# Patient Record
Sex: FEMALE | Race: WHITE | NOT HISPANIC OR LATINO | Employment: OTHER
[De-identification: names, ages, dates, MRNs, and addresses within clinical notes are randomized per-mention and may not be internally consistent; named-entity substitution may affect disease eponyms.]

---

## 2018-07-27 ENCOUNTER — RX ONLY (RX ONLY)
Age: 68
End: 2018-07-27

## 2018-07-27 ENCOUNTER — DOCTOR'S OFFICE (OUTPATIENT)
Dept: URBAN - NONMETROPOLITAN AREA CLINIC 1 | Facility: CLINIC | Age: 68
Setting detail: OPHTHALMOLOGY
End: 2018-07-27
Payer: COMMERCIAL

## 2018-07-27 DIAGNOSIS — E11.9: ICD-10-CM

## 2018-07-27 DIAGNOSIS — H25.13: ICD-10-CM

## 2018-07-27 DIAGNOSIS — H25.043: ICD-10-CM

## 2018-07-27 PROCEDURE — 76512 OPH US DX B-SCAN: CPT | Performed by: OPHTHALMOLOGY

## 2018-07-27 PROCEDURE — 92004 COMPRE OPH EXAM NEW PT 1/>: CPT | Performed by: OPHTHALMOLOGY

## 2018-07-27 ASSESSMENT — REFRACTION_MANIFEST
OU_VA: 20/
OS_VA3: 20/
OD_VA1: 20/
OS_VA1: 20/
OD_VA1: 20/
OS_VA3: 20/
OU_VA: 20/
OS_VA1: 20/
OU_VA: 20/
OS_VA2: 20/
OS_VA2: 20/
OD_VA2: 20/
OD_VA2: 20/
OS_VA1: 20/
OD_VA3: 20/
OD_VA2: 20/
OS_VA2: 20/
OD_VA3: 20/
OS_VA3: 20/
OD_VA1: 20/
OD_VA3: 20/

## 2018-07-27 ASSESSMENT — VISUAL ACUITY
OS_BCVA: 20/100
OD_BCVA: 20/CF X 5'

## 2018-07-27 ASSESSMENT — REFRACTION_AUTOREFRACTION
OS_AXIS: 102
OS_CYLINDER: -2.00
OS_SPHERE: -2.50
OD_SPHERE: +0.50
OD_AXIS: 095
OD_CYLINDER: -1.00

## 2018-07-27 ASSESSMENT — REFRACTION_CURRENTRX
OD_OVR_VA: 20/
OD_OVR_VA: 20/
OS_OVR_VA: 20/
OS_OVR_VA: 20/
OD_OVR_VA: 20/
OS_OVR_VA: 20/

## 2018-07-27 ASSESSMENT — CONFRONTATIONAL VISUAL FIELD TEST (CVF)
OS_FINDINGS: FULL
OD_FINDINGS: FULL

## 2018-07-27 ASSESSMENT — SPHEQUIV_DERIVED
OS_SPHEQUIV: -3.5
OD_SPHEQUIV: 0

## 2018-08-15 ENCOUNTER — DOCTOR'S OFFICE (OUTPATIENT)
Dept: URBAN - NONMETROPOLITAN AREA CLINIC 1 | Facility: CLINIC | Age: 68
Setting detail: OPHTHALMOLOGY
End: 2018-08-15
Payer: COMMERCIAL

## 2018-08-15 DIAGNOSIS — H25.12: ICD-10-CM

## 2018-08-15 PROCEDURE — 92136 OPHTHALMIC BIOMETRY: CPT | Performed by: OPHTHALMOLOGY

## 2018-09-06 ENCOUNTER — AMBUL SURGICAL CARE (OUTPATIENT)
Dept: URBAN - NONMETROPOLITAN AREA SURGERY 1 | Facility: SURGERY | Age: 68
Setting detail: OPHTHALMOLOGY
End: 2018-09-06
Payer: COMMERCIAL

## 2018-09-06 DIAGNOSIS — H25.12: ICD-10-CM

## 2018-09-06 DIAGNOSIS — H25.042: ICD-10-CM

## 2018-09-06 PROCEDURE — 66984 XCAPSL CTRC RMVL W/O ECP: CPT | Performed by: OPHTHALMOLOGY

## 2018-09-06 PROCEDURE — G8918 PT W/O PREOP ORDER IV AB PRO: HCPCS | Performed by: OPHTHALMOLOGY

## 2018-09-06 PROCEDURE — G8907 PT DOC NO EVENTS ON DISCHARG: HCPCS | Performed by: OPHTHALMOLOGY

## 2018-09-07 ENCOUNTER — DOCTOR'S OFFICE (OUTPATIENT)
Dept: URBAN - NONMETROPOLITAN AREA CLINIC 1 | Facility: CLINIC | Age: 68
Setting detail: OPHTHALMOLOGY
End: 2018-09-07
Payer: COMMERCIAL

## 2018-09-07 DIAGNOSIS — H25.11: ICD-10-CM

## 2018-09-07 DIAGNOSIS — Z96.1: ICD-10-CM

## 2018-09-07 DIAGNOSIS — H25.041: ICD-10-CM

## 2018-09-07 PROCEDURE — 92136 OPHTHALMIC BIOMETRY: CPT | Performed by: OPHTHALMOLOGY

## 2018-09-07 PROCEDURE — 99024 POSTOP FOLLOW-UP VISIT: CPT | Performed by: PHYSICIAN ASSISTANT

## 2018-09-10 ASSESSMENT — REFRACTION_MANIFEST
OS_VA2: 20/
OU_VA: 20/
OD_VA2: 20/
OD_VA1: 20/
OD_VA1: 20/
OS_VA2: 20/
OS_VA1: 20/
OS_VA3: 20/
OD_VA2: 20/
OD_VA3: 20/
OS_VA3: 20/
OS_VA1: 20/
OD_VA3: 20/
OU_VA: 20/

## 2018-09-10 ASSESSMENT — VISUAL ACUITY
OD_BCVA: 20/25
OS_BCVA: 20/100

## 2018-09-10 ASSESSMENT — REFRACTION_CURRENTRX
OD_OVR_VA: 20/
OS_OVR_VA: 20/
OS_OVR_VA: 20/
OD_OVR_VA: 20/
OS_OVR_VA: 20/
OD_OVR_VA: 20/

## 2018-09-10 ASSESSMENT — SPHEQUIV_DERIVED
OD_SPHEQUIV: 0
OS_SPHEQUIV: 1

## 2018-09-10 ASSESSMENT — REFRACTION_AUTOREFRACTION
OD_SPHERE: +0.50
OS_SPHERE: +1.25
OD_CYLINDER: -1.00
OD_AXIS: 095
OS_CYLINDER: -0.50
OS_AXIS: 069

## 2018-09-19 ENCOUNTER — DOCTOR'S OFFICE (OUTPATIENT)
Dept: URBAN - NONMETROPOLITAN AREA CLINIC 1 | Facility: CLINIC | Age: 68
Setting detail: OPHTHALMOLOGY
End: 2018-09-19
Payer: COMMERCIAL

## 2018-09-19 DIAGNOSIS — H25.11: ICD-10-CM

## 2018-09-19 DIAGNOSIS — Z96.1: ICD-10-CM

## 2018-09-19 DIAGNOSIS — H25.041: ICD-10-CM

## 2018-09-19 PROCEDURE — 99024 POSTOP FOLLOW-UP VISIT: CPT | Performed by: PHYSICIAN ASSISTANT

## 2018-09-20 ASSESSMENT — REFRACTION_MANIFEST
OD_VA3: 20/
OD_VA1: 20/
OD_VA1: 20/
OU_VA: 20/
OD_VA3: 20/
OD_VA2: 20/
OU_VA: 20/
OS_VA2: 20/
OS_VA1: 20/
OS_VA3: 20/
OS_VA3: 20/
OD_VA2: 20/
OS_VA1: 20/
OS_VA2: 20/

## 2018-09-20 ASSESSMENT — REFRACTION_CURRENTRX
OS_OVR_VA: 20/
OD_OVR_VA: 20/
OS_OVR_VA: 20/
OS_OVR_VA: 20/
OD_OVR_VA: 20/
OD_OVR_VA: 20/

## 2018-09-20 ASSESSMENT — SPHEQUIV_DERIVED
OS_SPHEQUIV: 0.625
OD_SPHEQUIV: 0

## 2018-09-20 ASSESSMENT — REFRACTION_AUTOREFRACTION
OD_SPHERE: +0.50
OD_CYLINDER: -1.00
OS_SPHERE: +0.75
OS_CYLINDER: -0.25
OD_AXIS: 095
OS_AXIS: 056

## 2018-09-20 ASSESSMENT — VISUAL ACUITY
OS_BCVA: 20/100
OD_BCVA: 20/20-1

## 2019-02-06 ENCOUNTER — DOCTOR'S OFFICE (OUTPATIENT)
Dept: URBAN - NONMETROPOLITAN AREA CLINIC 1 | Facility: CLINIC | Age: 69
Setting detail: OPHTHALMOLOGY
End: 2019-02-06
Payer: COMMERCIAL

## 2019-02-06 DIAGNOSIS — H25.11: ICD-10-CM

## 2019-02-06 DIAGNOSIS — Z79.4: ICD-10-CM

## 2019-02-06 DIAGNOSIS — Z96.1: ICD-10-CM

## 2019-02-06 DIAGNOSIS — E11.9: ICD-10-CM

## 2019-02-06 DIAGNOSIS — H25.041: ICD-10-CM

## 2019-02-06 DIAGNOSIS — E11.3293: ICD-10-CM

## 2019-02-06 PROCEDURE — 76512 OPH US DX B-SCAN: CPT | Performed by: OPHTHALMOLOGY

## 2019-02-06 PROCEDURE — 92014 COMPRE OPH EXAM EST PT 1/>: CPT | Performed by: OPHTHALMOLOGY

## 2019-02-06 ASSESSMENT — REFRACTION_MANIFEST
OS_VA1: 20/
OD_VA3: 20/
OD_VA2: 20/
OS_VA3: 20/
OU_VA: 20/
OD_VA1: 20/
OD_VA2: 20/
OS_VA2: 20/
OS_VA1: 20/
OD_VA1: 20/
OD_VA3: 20/
OU_VA: 20/
OS_VA3: 20/
OS_VA2: 20/

## 2019-02-06 ASSESSMENT — REFRACTION_CURRENTRX
OS_OVR_VA: 20/
OS_OVR_VA: 20/
OD_OVR_VA: 20/
OS_OVR_VA: 20/
OD_OVR_VA: 20/
OD_OVR_VA: 20/

## 2019-02-06 ASSESSMENT — CONFRONTATIONAL VISUAL FIELD TEST (CVF)
OS_FINDINGS: FULL
OD_FINDINGS: FULL

## 2019-02-06 ASSESSMENT — VISUAL ACUITY
OD_BCVA: 20/20
OS_BCVA: 20/150

## 2019-02-06 ASSESSMENT — REFRACTION_AUTOREFRACTION
OS_AXIS: 050
OS_SPHERE: +0.75
OS_CYLINDER: -0.75

## 2019-02-06 ASSESSMENT — SPHEQUIV_DERIVED: OS_SPHEQUIV: 0.375

## 2019-03-07 ENCOUNTER — AMBUL SURGICAL CARE (OUTPATIENT)
Dept: URBAN - NONMETROPOLITAN AREA SURGERY 1 | Facility: SURGERY | Age: 69
Setting detail: OPHTHALMOLOGY
End: 2019-03-07
Payer: COMMERCIAL

## 2019-03-07 DIAGNOSIS — H25.11: ICD-10-CM

## 2019-03-07 DIAGNOSIS — H25.041: ICD-10-CM

## 2019-03-07 PROCEDURE — G8918 PT W/O PREOP ORDER IV AB PRO: HCPCS | Performed by: OPHTHALMOLOGY

## 2019-03-07 PROCEDURE — 66984 XCAPSL CTRC RMVL W/O ECP: CPT | Performed by: OPHTHALMOLOGY

## 2019-03-07 PROCEDURE — G8907 PT DOC NO EVENTS ON DISCHARG: HCPCS | Performed by: OPHTHALMOLOGY

## 2019-03-08 ENCOUNTER — RX ONLY (RX ONLY)
Age: 69
End: 2019-03-08

## 2019-03-08 ENCOUNTER — DOCTOR'S OFFICE (OUTPATIENT)
Dept: URBAN - NONMETROPOLITAN AREA CLINIC 1 | Facility: CLINIC | Age: 69
Setting detail: OPHTHALMOLOGY
End: 2019-03-08

## 2019-03-08 DIAGNOSIS — Z96.1: ICD-10-CM

## 2019-03-08 PROBLEM — H25.041 PSC POLAR AGE RELATED CATARACT; RIGHT EYE: Status: RESOLVED | Noted: 2018-09-07 | Resolved: 2019-03-08

## 2019-03-08 PROBLEM — H25.11 CATARACT NUCLEAR SCLEROSIS AGE RELATED; RIGHT EYE: Status: RESOLVED | Noted: 2018-09-07 | Resolved: 2019-03-08

## 2019-03-08 PROCEDURE — 99024 POSTOP FOLLOW-UP VISIT: CPT | Performed by: PHYSICIAN ASSISTANT

## 2019-03-11 ASSESSMENT — SPHEQUIV_DERIVED
OS_SPHEQUIV: 0.375
OD_SPHEQUIV: 0.5

## 2019-03-11 ASSESSMENT — REFRACTION_MANIFEST
OD_VA2: 20/
OS_VA2: 20/
OS_VA3: 20/
OD_VA2: 20/
OD_VA1: 20/
OD_VA1: 20/
OU_VA: 20/
OS_VA3: 20/
OD_VA3: 20/
OD_VA3: 20/
OU_VA: 20/
OS_VA1: 20/
OS_VA1: 20/
OS_VA2: 20/

## 2019-03-11 ASSESSMENT — REFRACTION_AUTOREFRACTION
OS_AXIS: 050
OS_SPHERE: +0.75
OS_CYLINDER: -0.75
OD_CYLINDER: -1.00
OD_AXIS: 158
OD_SPHERE: +1.00

## 2019-03-11 ASSESSMENT — VISUAL ACUITY
OD_BCVA: 20/20
OS_BCVA: 20/25-2

## 2019-03-11 ASSESSMENT — REFRACTION_CURRENTRX
OS_OVR_VA: 20/
OD_OVR_VA: 20/
OS_OVR_VA: 20/
OD_OVR_VA: 20/
OD_OVR_VA: 20/
OS_OVR_VA: 20/

## 2019-03-20 ENCOUNTER — DOCTOR'S OFFICE (OUTPATIENT)
Dept: URBAN - NONMETROPOLITAN AREA CLINIC 1 | Facility: CLINIC | Age: 69
Setting detail: OPHTHALMOLOGY
End: 2019-03-20
Payer: COMMERCIAL

## 2019-03-20 DIAGNOSIS — Z96.1: ICD-10-CM

## 2019-03-20 PROCEDURE — 99024 POSTOP FOLLOW-UP VISIT: CPT | Performed by: PHYSICIAN ASSISTANT

## 2019-03-21 PROBLEM — E11.3293: Status: ACTIVE | Noted: 2019-02-06

## 2019-03-21 PROBLEM — E11.9: Status: ACTIVE | Noted: 2019-02-06

## 2019-03-21 PROBLEM — Z96.1 PRESENCE OF INTRAOCULAR LENS ; BOTH EYES: Status: ACTIVE | Noted: 2018-09-07

## 2019-03-21 ASSESSMENT — REFRACTION_MANIFEST
OS_VA2: 20/
OD_VA1: 20/
OS_VA2: 20/
OD_VA2: 20/
OD_VA3: 20/
OD_VA2: 20/
OS_VA3: 20/
OS_VA1: 20/
OS_VA1: 20/
OU_VA: 20/
OS_VA3: 20/
OD_VA1: 20/
OD_VA3: 20/
OU_VA: 20/

## 2019-03-21 ASSESSMENT — REFRACTION_CURRENTRX
OS_OVR_VA: 20/
OD_OVR_VA: 20/
OS_OVR_VA: 20/
OS_OVR_VA: 20/

## 2019-03-21 ASSESSMENT — REFRACTION_AUTOREFRACTION
OS_SPHERE: +0.50
OS_CYLINDER: -0.50
OD_AXIS: 151
OD_SPHERE: +0.25
OD_CYLINDER: -0.50
OS_AXIS: 045

## 2019-03-21 ASSESSMENT — VISUAL ACUITY
OD_BCVA: 20/20-1
OS_BCVA: 20/25+2

## 2019-03-21 ASSESSMENT — SPHEQUIV_DERIVED
OD_SPHEQUIV: 0
OS_SPHEQUIV: 0.25

## 2020-09-11 ENCOUNTER — HOSPITAL ENCOUNTER (EMERGENCY)
Facility: HOSPITAL | Age: 70
Discharge: HOME | End: 2020-09-11
Attending: STUDENT IN AN ORGANIZED HEALTH CARE EDUCATION/TRAINING PROGRAM | Admitting: STUDENT IN AN ORGANIZED HEALTH CARE EDUCATION/TRAINING PROGRAM
Payer: MEDICARE

## 2020-09-11 VITALS
TEMPERATURE: 97 F | DIASTOLIC BLOOD PRESSURE: 68 MMHG | SYSTOLIC BLOOD PRESSURE: 112 MMHG | RESPIRATION RATE: 24 BRPM | WEIGHT: 289.02 LBS | HEART RATE: 74 BPM | OXYGEN SATURATION: 95 %

## 2020-09-11 DIAGNOSIS — R11.2 NAUSEA VOMITING AND DIARRHEA: Primary | ICD-10-CM

## 2020-09-11 DIAGNOSIS — R19.7 NAUSEA VOMITING AND DIARRHEA: Primary | ICD-10-CM

## 2020-09-11 LAB
ALBUMIN SERPL BCP-MCNC: 3.4 G/DL (ref 3.5–5)
ALP SERPL-CCNC: 121 U/L (ref 46–116)
ALT SERPL W P-5'-P-CCNC: 27 U/L (ref 12–78)
ANION GAP SERPL CALCULATED.3IONS-SCNC: 11 MMOL/L (ref 4–13)
AST SERPL W P-5'-P-CCNC: 27 U/L (ref 5–45)
BASOPHILS # BLD AUTO: 0.01 THOUSANDS/ΜL (ref 0–0.1)
BASOPHILS NFR BLD AUTO: 0 % (ref 0–1)
BILIRUB DIRECT SERPL-MCNC: 0.08 MG/DL (ref 0–0.2)
BILIRUB SERPL-MCNC: 0.6 MG/DL (ref 0.2–1)
BUN SERPL-MCNC: 7 MG/DL (ref 5–25)
CALCIUM SERPL-MCNC: 8.9 MG/DL (ref 8.3–10.1)
CHLORIDE SERPL-SCNC: 100 MMOL/L (ref 100–108)
CO2 SERPL-SCNC: 26 MMOL/L (ref 21–32)
CREAT SERPL-MCNC: 0.82 MG/DL (ref 0.6–1.3)
EOSINOPHIL # BLD AUTO: 0.07 THOUSAND/ΜL (ref 0–0.61)
EOSINOPHIL NFR BLD AUTO: 1 % (ref 0–6)
ERYTHROCYTE [DISTWIDTH] IN BLOOD BY AUTOMATED COUNT: 14.5 % (ref 11.6–15.1)
GFR SERPL CREATININE-BSD FRML MDRD: 73 ML/MIN/1.73SQ M
GLUCOSE SERPL-MCNC: 165 MG/DL (ref 65–140)
HCT VFR BLD AUTO: 42.8 % (ref 34.8–46.1)
HGB BLD-MCNC: 13.9 G/DL (ref 11.5–15.4)
IMM GRANULOCYTES # BLD AUTO: 0.02 THOUSAND/UL (ref 0–0.2)
IMM GRANULOCYTES NFR BLD AUTO: 0 % (ref 0–2)
LACTATE SERPL-SCNC: 1.4 MMOL/L (ref 0.5–2)
LIPASE SERPL-CCNC: 86 U/L (ref 73–393)
LYMPHOCYTES # BLD AUTO: 1.49 THOUSANDS/ΜL (ref 0.6–4.47)
LYMPHOCYTES NFR BLD AUTO: 29 % (ref 14–44)
MAGNESIUM SERPL-MCNC: 1.7 MG/DL (ref 1.6–2.6)
MCH RBC QN AUTO: 26.6 PG (ref 26.8–34.3)
MCHC RBC AUTO-ENTMCNC: 32.5 G/DL (ref 31.4–37.4)
MCV RBC AUTO: 82 FL (ref 82–98)
MONOCYTES # BLD AUTO: 0.39 THOUSAND/ΜL (ref 0.17–1.22)
MONOCYTES NFR BLD AUTO: 8 % (ref 4–12)
NEUTROPHILS # BLD AUTO: 3.08 THOUSANDS/ΜL (ref 1.85–7.62)
NEUTS SEG NFR BLD AUTO: 62 % (ref 43–75)
NRBC BLD AUTO-RTO: 0 /100 WBCS
PHOSPHATE SERPL-MCNC: 3 MG/DL (ref 2.3–4.1)
PLATELET # BLD AUTO: 264 THOUSANDS/UL (ref 149–390)
PMV BLD AUTO: 9.1 FL (ref 8.9–12.7)
POTASSIUM SERPL-SCNC: 4.5 MMOL/L (ref 3.5–5.3)
PROT SERPL-MCNC: 7.1 G/DL (ref 6.4–8.2)
RBC # BLD AUTO: 5.22 MILLION/UL (ref 3.81–5.12)
SODIUM SERPL-SCNC: 137 MMOL/L (ref 136–145)
WBC # BLD AUTO: 5.06 THOUSAND/UL (ref 4.31–10.16)

## 2020-09-11 PROCEDURE — 93005 ELECTROCARDIOGRAM TRACING: CPT

## 2020-09-11 PROCEDURE — 85025 COMPLETE CBC W/AUTO DIFF WBC: CPT | Performed by: STUDENT IN AN ORGANIZED HEALTH CARE EDUCATION/TRAINING PROGRAM

## 2020-09-11 PROCEDURE — 99285 EMERGENCY DEPT VISIT HI MDM: CPT | Performed by: STUDENT IN AN ORGANIZED HEALTH CARE EDUCATION/TRAINING PROGRAM

## 2020-09-11 PROCEDURE — 80076 HEPATIC FUNCTION PANEL: CPT | Performed by: STUDENT IN AN ORGANIZED HEALTH CARE EDUCATION/TRAINING PROGRAM

## 2020-09-11 PROCEDURE — 99283 EMERGENCY DEPT VISIT LOW MDM: CPT

## 2020-09-11 PROCEDURE — 80048 BASIC METABOLIC PNL TOTAL CA: CPT | Performed by: STUDENT IN AN ORGANIZED HEALTH CARE EDUCATION/TRAINING PROGRAM

## 2020-09-11 PROCEDURE — 36415 COLL VENOUS BLD VENIPUNCTURE: CPT | Performed by: STUDENT IN AN ORGANIZED HEALTH CARE EDUCATION/TRAINING PROGRAM

## 2020-09-11 PROCEDURE — 83605 ASSAY OF LACTIC ACID: CPT | Performed by: STUDENT IN AN ORGANIZED HEALTH CARE EDUCATION/TRAINING PROGRAM

## 2020-09-11 PROCEDURE — 83690 ASSAY OF LIPASE: CPT | Performed by: STUDENT IN AN ORGANIZED HEALTH CARE EDUCATION/TRAINING PROGRAM

## 2020-09-11 PROCEDURE — 84100 ASSAY OF PHOSPHORUS: CPT | Performed by: STUDENT IN AN ORGANIZED HEALTH CARE EDUCATION/TRAINING PROGRAM

## 2020-09-11 PROCEDURE — 96374 THER/PROPH/DIAG INJ IV PUSH: CPT

## 2020-09-11 PROCEDURE — 83735 ASSAY OF MAGNESIUM: CPT | Performed by: STUDENT IN AN ORGANIZED HEALTH CARE EDUCATION/TRAINING PROGRAM

## 2020-09-11 PROCEDURE — 96361 HYDRATE IV INFUSION ADD-ON: CPT

## 2020-09-11 RX ORDER — ONDANSETRON 4 MG/1
4 TABLET, ORALLY DISINTEGRATING ORAL EVERY 6 HOURS PRN
Qty: 16 TABLET | Refills: 0 | Status: SHIPPED | OUTPATIENT
Start: 2020-09-11

## 2020-09-11 RX ORDER — ONDANSETRON 2 MG/ML
4 INJECTION INTRAMUSCULAR; INTRAVENOUS ONCE
Status: COMPLETED | OUTPATIENT
Start: 2020-09-11 | End: 2020-09-11

## 2020-09-11 RX ADMIN — ONDANSETRON 4 MG: 2 INJECTION INTRAMUSCULAR; INTRAVENOUS at 14:52

## 2020-09-11 RX ADMIN — SODIUM CHLORIDE 1000 ML: 0.9 INJECTION, SOLUTION INTRAVENOUS at 13:32

## 2020-09-11 NOTE — DISCHARGE INSTRUCTIONS
You were evaluated in the emergency department for nausea, vomiting and diarrhea  The laboratory studies that were obtained tonight show any significant abnormalities  You are being prescribed Zofran  Please take as directed and follow-up with your primary care physician  Do not hesitate to return to the emergency department for any concerning signs or symptoms

## 2020-09-11 NOTE — ED PROVIDER NOTES
History  Chief Complaint   Patient presents with    Nausea     with weakness for about 2 weeks, vomitted numerous times last time about 2 hrs ago  55-year-old female  Past medical history significant for type 2 diabetes, dilated cardiomyopathy, pulmonary hypertension  Has been having multiple episodes of nausea, vomiting, diarrhea x 10 days  Hasn't been able to tolerate PO for the last few days  Lives at home with her bedridden  who hasn't been ill with similar symptoms  In addition to the above mentioned symptoms, the patient has been having epigastric pain that is exacerbated with retching  Denies bloody emesis or diarrhea, recent antibiotic use, recent travel  Past Medical History:   Diagnosis Date    Arthritis     CHF (congestive heart failure) (HCC)     Disease of thyroid gland      Past Surgical History:   Procedure Laterality Date    HYSTERECTOMY       History reviewed  No pertinent family history  I have reviewed and agree with the history as documented  E-Cigarette/Vaping    E-Cigarette Use Never User      E-Cigarette/Vaping Substances     Social History     Tobacco Use    Smoking status: Never Smoker    Smokeless tobacco: Never Used   Substance Use Topics    Alcohol use: Never     Frequency: Never    Drug use: Never       Review of Systems   Constitutional: Positive for fatigue  Negative for chills and fever  HENT: Negative for congestion and rhinorrhea  Eyes: Negative for pain and visual disturbance  Respiratory: Negative for chest tightness and shortness of breath  Cardiovascular: Negative for chest pain  Gastrointestinal: Positive for abdominal pain, diarrhea, nausea and vomiting  Genitourinary: Negative for decreased urine volume, difficulty urinating and dysuria  Musculoskeletal: Negative for back pain and myalgias  Skin: Negative for color change and pallor  Neurological: Negative for dizziness, syncope, weakness and light-headedness  Psychiatric/Behavioral: Negative for confusion  Physical Exam  Physical Exam  Vitals signs and nursing note reviewed  Constitutional:       General: She is in acute distress  Appearance: She is not toxic-appearing  HENT:      Head: Normocephalic and atraumatic  Right Ear: External ear normal       Left Ear: External ear normal       Mouth/Throat:      Mouth: Mucous membranes are moist       Pharynx: Oropharynx is clear  Eyes:      Extraocular Movements: Extraocular movements intact  Pupils: Pupils are equal, round, and reactive to light  Cardiovascular:      Rate and Rhythm: Normal rate and regular rhythm  Pulses: Normal pulses  Pulmonary:      Effort: Pulmonary effort is normal       Breath sounds: Normal breath sounds  Abdominal:      General: Abdomen is flat  Palpations: Abdomen is soft  Comments: Mild tenderness to palpation along the epigastric region  No rebound/guarding noted  Normal bowel sounds  Musculoskeletal:         General: No tenderness  Comments: Trace bilateral peripheral edema  Skin:     General: Skin is warm and dry  Capillary Refill: Capillary refill takes less than 2 seconds  Neurological:      General: No focal deficit present  Mental Status: She is alert and oriented to person, place, and time     Psychiatric:         Mood and Affect: Mood normal        Vital Signs  ED Triage Vitals [09/11/20 1325]   Temperature Pulse Respirations Blood Pressure SpO2   (!) 97 °F (36 1 °C) 90 18 (!) 179/99 94 %      Temp Source Heart Rate Source Patient Position - Orthostatic VS BP Location FiO2 (%)   Temporal Monitor -- -- --      Pain Score       --           Vitals:    09/11/20 1400 09/11/20 1430 09/11/20 1600 09/11/20 1638   BP: 143/96 146/80 106/54 112/68   Pulse:  75 76 74     ED Medications  Medications   sodium chloride 0 9 % bolus 1,000 mL (0 mL Intravenous Stopped 9/11/20 1457)   ondansetron (ZOFRAN) injection 4 mg (4 mg Intravenous Given 9/11/20 1452)     Diagnostic Studies  Results Reviewed     Procedure Component Value Units Date/Time    Basic metabolic panel [[id number]]  (Abnormal) Collected:  09/11/20 1331    Lab Status:  Final result Specimen:  Blood from Arm, Right Updated:  09/11/20 1357     Sodium 137 mmol/L      Potassium 4 5 mmol/L      Chloride 100 mmol/L      CO2 26 mmol/L      ANION GAP 11 mmol/L      BUN 7 mg/dL      Creatinine 0 82 mg/dL      Glucose 165 mg/dL      Calcium 8 9 mg/dL      eGFR 73 ml/min/1 73sq m     Narrative:       [place] guidelines for Chronic Kidney Disease (CKD):     Stage 1 with normal or high GFR (GFR > 90 mL/min/1 73 square meters)    Stage 2 Mild CKD (GFR = 60-89 mL/min/1 73 square meters)    Stage 3A Moderate CKD (GFR = 45-59 mL/min/1 73 square meters)    Stage 3B Moderate CKD (GFR = 30-44 mL/min/1 73 square meters)    Stage 4 Severe CKD (GFR = 15-29 mL/min/1 73 square meters)    Stage 5 End Stage CKD (GFR <15 mL/min/1 73 square meters)  Note: GFR calculation is accurate only with a steady state creatinine    Lipase [[id number]]  (Normal) Collected:  09/11/20 1331    Lab Status:  Final result Specimen:  Blood from Arm, Right Updated:  09/11/20 1357     Lipase 86 u/L     Lactic acid, plasma [[id number]]  (Normal) Collected:  09/11/20 1331    Lab Status:  Final result Specimen:  Blood from Arm, Right Updated:  09/11/20 1357     LACTIC ACID 1 4 mmol/L     Narrative:       Result may be elevated if tourniquet was used during collection      Magnesium [[id number]]  (Normal) Collected:  09/11/20 1331    Lab Status:  Final result Specimen:  Blood from Arm, Right Updated:  09/11/20 1357     Magnesium 1 7 mg/dL     Phosphorus [[id number]]  (Normal) Collected:  09/11/20 1331    Lab Status:  Final result Specimen:  Blood from Arm, Right Updated:  09/11/20 1357     Phosphorus 3 0 mg/dL     Hepatic function panel [[id number]]  (Abnormal) Collected:  09/11/20 1331    Lab Status:  Final result Specimen:  Blood from Arm, Right Updated:  09/11/20 1357     Total Bilirubin 0 60 mg/dL      Bilirubin, Direct 0 08 mg/dL      Alkaline Phosphatase 121 U/L      AST 27 U/L      ALT 27 U/L      Total Protein 7 1 g/dL      Albumin 3 4 g/dL     CBC and differential [[id number]]  (Abnormal) Collected:  09/11/20 1331    Lab Status:  Final result Specimen:  Blood from Arm, Right Updated:  09/11/20 1339     WBC 5 06 Thousand/uL      RBC 5 22 Million/uL      Hemoglobin 13 9 g/dL      Hematocrit 42 8 %      MCV 82 fL      MCH 26 6 pg      MCHC 32 5 g/dL      RDW 14 5 %      MPV 9 1 fL      Platelets 794 Thousands/uL      nRBC 0 /100 WBCs      Neutrophils Relative 62 %      Immat GRANS % 0 %      Lymphocytes Relative 29 %      Monocytes Relative 8 %      Eosinophils Relative 1 %      Basophils Relative 0 %      Neutrophils Absolute 3 08 Thousands/µL      Immature Grans Absolute 0 02 Thousand/uL      Lymphocytes Absolute 1 49 Thousands/µL      Monocytes Absolute 0 39 Thousand/µL      Eosinophils Absolute 0 07 Thousand/µL      Basophils Absolute 0 01 Thousands/µL              No orders to display          Procedures  ECG 12 Lead Documentation Only    Date/Time: 9/11/2020 1:47 PM  Performed by: Arturo Carrillo DO  Authorized by: Arturo Carrillo DO     Indications / Diagnosis:  Epigastric pain  ECG reviewed by me, the ED Provider: yes    Patient location:  ED  Previous ECG:     Previous ECG:  Unavailable  Interpretation:     Interpretation: non-specific    Rate:     ECG rate:  95    ECG rate assessment: normal    Rhythm:     Rhythm: sinus rhythm    Ectopy:     Ectopy: PVCs      PVCs:  Infrequent  QRS:     QRS axis:  Normal    QRS intervals:  Normal  Conduction:     Conduction: normal    ST segments:     ST segments:  Normal  T waves:     T waves: normal    Q waves:     Q waves:  AVL        ED Course  ED Course as of Sep 11 1645   Fri Sep 11, 2020   1347 No significant electrolyte abnormalities  Hgb WNL         [address] Lactic acid within normal limits; lipase within normal limits      1415 Feeling better with IVF and Zofran  Patient wanting to go home  MDM  Number of Diagnoses or Management Options  Nausea vomiting and diarrhea:   Diagnosis management comments: History and clinical findings are most consistent with the above diagnoses  59-year-old female  Having worsening nausea, vomiting, diarrhea x 10 days  Mild tenderness along the epigastric region likely secondary to retching  Patient denies hematemesis  Abdominal exam benign  Low concern for peritonitis  Laboratory interpretation above  The patient was administered IV fluids and IV Zofran which improved her symptoms  Repeat abdominal exam was negative for tenderness/rebound/guarding  Oral hydration recommended  The patient was prescribed a course of Zofran p r n  Dara Soulier PCP follow-up recommended  Decision to treat as an outpatient was made  The patient and her daughter were counseled regarding concerning signs and symptoms that should prompt re-evaluation in the emergency department  All questions were addressed  See discharge instructions for further information  The patient was stable while under my care  Disposition  Final diagnoses:   Nausea vomiting and diarrhea     Time reflects when diagnosis was documented in both MDM as applicable and the Disposition within this note     Time User Action Codes Description Comment    9/11/2020  3:20 PM Jessie Marion Add [R11 2,  R19 7] Nausea vomiting and diarrhea       ED Disposition     ED Disposition Condition Date/Time Comment    Discharge Stable Fri Sep 11, 2020  3:22 PM Sury Ellsworth discharge to home/self care              Follow-up Information    None         Discharge Medication List as of 9/11/2020  3:22 PM      START taking these medications    Details   ondansetron (ZOFRAN-ODT) 4 mg disintegrating tablet Take 1 tablet (4 mg total) by mouth every 6 (six) hours as needed for nausea or vomiting, Starting Fri 9/11/2020, Normal           No discharge procedures on file      PDMP Review     None          ED Provider  Electronically Signed by           Arturo Carrillo,   09/11/20 0807

## 2020-09-15 LAB
ATRIAL RATE: 95 BPM
P AXIS: 59 DEGREES
PR INTERVAL: 182 MS
QRS AXIS: 54 DEGREES
QRSD INTERVAL: 98 MS
QT INTERVAL: 388 MS
QTC INTERVAL: 487 MS
T WAVE AXIS: 31 DEGREES
VENTRICULAR RATE: 95 BPM

## 2020-09-15 PROCEDURE — 93010 ELECTROCARDIOGRAM REPORT: CPT | Performed by: INTERNAL MEDICINE

## 2021-06-29 DIAGNOSIS — I50.22 CHRONIC SYSTOLIC (CONGESTIVE) HEART FAILURE (HCC): ICD-10-CM

## 2021-06-29 DIAGNOSIS — G47.33 OBSTRUCTIVE SLEEP APNEA (ADULT) (PEDIATRIC): ICD-10-CM

## 2021-06-29 DIAGNOSIS — I48.0 PAROXYSMAL ATRIAL FIBRILLATION (HCC): ICD-10-CM

## 2021-06-29 DIAGNOSIS — E11.9 TYPE 2 DIABETES MELLITUS WITHOUT COMPLICATIONS (HCC): ICD-10-CM

## 2021-06-29 DIAGNOSIS — Z95.810 PRESENCE OF AUTOMATIC (IMPLANTABLE) CARDIAC DEFIBRILLATOR: ICD-10-CM

## 2021-06-29 DIAGNOSIS — I27.20 PULMONARY HYPERTENSION, UNSPECIFIED (HCC): ICD-10-CM

## 2021-06-29 DIAGNOSIS — I34.0 NONRHEUMATIC MITRAL (VALVE) INSUFFICIENCY: ICD-10-CM

## 2021-06-29 DIAGNOSIS — I35.0 NONRHEUMATIC AORTIC (VALVE) STENOSIS: ICD-10-CM

## 2021-06-29 DIAGNOSIS — I42.8 OTHER CARDIOMYOPATHIES (HCC): ICD-10-CM

## 2021-06-29 DIAGNOSIS — I10 ESSENTIAL (PRIMARY) HYPERTENSION: ICD-10-CM

## 2021-06-29 DIAGNOSIS — E78.5 HYPERLIPIDEMIA, UNSPECIFIED: ICD-10-CM

## 2021-07-29 ENCOUNTER — HOSPITAL ENCOUNTER (OUTPATIENT)
Dept: NON INVASIVE DIAGNOSTICS | Facility: HOSPITAL | Age: 71
Discharge: HOME | End: 2021-07-29
Payer: MEDICARE

## 2021-07-29 DIAGNOSIS — I34.0 NONRHEUMATIC MITRAL (VALVE) INSUFFICIENCY: ICD-10-CM

## 2021-07-29 PROCEDURE — C8929 TTE W OR WO FOL WCON,DOPPLER: HCPCS

## 2021-07-29 RX ADMIN — PERFLUTREN 0.4 ML/MIN: 6.52 INJECTION, SUSPENSION INTRAVENOUS at 12:14

## 2022-06-07 ENCOUNTER — HOSPITAL ENCOUNTER (OUTPATIENT)
Dept: NON INVASIVE DIAGNOSTICS | Facility: HOSPITAL | Age: 72
Discharge: HOME | End: 2022-06-07
Payer: MEDICARE

## 2022-06-07 VITALS
DIASTOLIC BLOOD PRESSURE: 72 MMHG | BODY MASS INDEX: 51.21 KG/M2 | WEIGHT: 289 LBS | HEIGHT: 63 IN | HEART RATE: 72 BPM | SYSTOLIC BLOOD PRESSURE: 130 MMHG

## 2022-06-07 DIAGNOSIS — E78.5 HYPERLIPIDEMIA, UNSPECIFIED: ICD-10-CM

## 2022-06-07 DIAGNOSIS — I35.0 NONRHEUMATIC AORTIC (VALVE) STENOSIS: ICD-10-CM

## 2022-06-07 DIAGNOSIS — I50.22 CHRONIC SYSTOLIC (CONGESTIVE) HEART FAILURE (HCC): ICD-10-CM

## 2022-06-07 DIAGNOSIS — E11.9 TYPE 2 DIABETES MELLITUS WITHOUT COMPLICATIONS (HCC): ICD-10-CM

## 2022-06-07 DIAGNOSIS — I48.0 PAROXYSMAL ATRIAL FIBRILLATION (HCC): ICD-10-CM

## 2022-06-07 DIAGNOSIS — G47.33 OBSTRUCTIVE SLEEP APNEA (ADULT) (PEDIATRIC): ICD-10-CM

## 2022-06-07 DIAGNOSIS — Z95.810 PRESENCE OF AUTOMATIC (IMPLANTABLE) CARDIAC DEFIBRILLATOR: ICD-10-CM

## 2022-06-07 DIAGNOSIS — I42.8 OTHER CARDIOMYOPATHIES (HCC): ICD-10-CM

## 2022-06-07 LAB
AORTIC ROOT: 3.2 CM
AORTIC VALVE MEAN VELOCITY: 16.1 M/S
APICAL FOUR CHAMBER EJECTION FRACTION: 54 %
AV AREA BY CONTINUOUS VTI: 1.1 CM2
AV AREA PEAK VELOCITY: 1 CM2
AV LVOT MEAN GRADIENT: 2 MMHG
AV LVOT PEAK GRADIENT: 2 MMHG
AV MEAN GRADIENT: 12 MMHG
AV PEAK GRADIENT: 23 MMHG
AV REGURGITATION PRESSURE HALF TIME: 515 MS
AV VALVE AREA: 1.06 CM2
AV VELOCITY RATIO: 0.33
DOP CALC AO PEAK VEL: 2.4 M/S
DOP CALC AO VTI: 53.56 CM
DOP CALC LVOT AREA: 3.14 CM2
DOP CALC LVOT DIAMETER: 2 CM
DOP CALC LVOT PEAK VEL VTI: 18.14 CM
DOP CALC LVOT PEAK VEL: 0.79 M/S
DOP CALC LVOT STROKE VOLUME: 56.96
E WAVE DECELERATION TIME: 167 MS
FRACTIONAL SHORTENING: 29 (ref 28–44)
INTERVENTRICULAR SEPTUM IN DIASTOLE (PARASTERNAL SHORT AXIS VIEW): 1 CM
INTERVENTRICULAR SEPTUM: 1 CM (ref 0.6–1.1)
LAAS-AP4: 22.5 CM2
LEFT ATRIUM SIZE: 4.5 CM
LEFT INTERNAL DIMENSION IN SYSTOLE: 4.4 CM (ref 2.1–4)
LEFT VENTRICULAR INTERNAL DIMENSION IN DIASTOLE: 6.2 CM (ref 3.5–6)
LEFT VENTRICULAR POSTERIOR WALL IN END DIASTOLE: 1 CM
LEFT VENTRICULAR STROKE VOLUME: 105 ML
LVSV (TEICH): 105 ML
MV E'TISSUE VEL-LAT: 14 CM/S
MV E'TISSUE VEL-SEP: 9 CM/S
MV EROA: 0.3 CM2
MV PEAK A VEL: 0.9 M/S
MV PEAK E VEL: 117 CM/S
MV STENOSIS PRESSURE HALF TIME: 48 MS
MV VALVE AREA P 1/2 METHOD: 4.58
PISA MRMAX VEL: 0.42 M/S
PISA RADIUS: 0.7 CM
RIGHT ATRIAL 2D VOLUME: 24 ML
RIGHT ATRIUM AREA SYSTOLE A4C: 11.3 CM2
RIGHT VENTRICLE ID DIMENSION: 3.2 CM
SL CV AV DECELERATION TIME RETROGRADE: 1775 MS
SL CV AV PEAK GRADIENT RETROGRADE: 25 MMHG
SL CV LV EF: 40
SL CV PED ECHO LEFT VENTRICLE DIASTOLIC VOLUME (MOD BIPLANE) 2D: 195 ML
SL CV PED ECHO LEFT VENTRICLE SYSTOLIC VOLUME (MOD BIPLANE) 2D: 90 ML
TR MAX PG: 30 MMHG
TR PEAK VELOCITY: 2.7 M/S
TRICUSPID VALVE PEAK REGURGITATION VELOCITY: 2.72 M/S

## 2022-06-07 PROCEDURE — 93306 TTE W/DOPPLER COMPLETE: CPT | Performed by: INTERNAL MEDICINE

## 2022-06-07 PROCEDURE — 93306 TTE W/DOPPLER COMPLETE: CPT

## 2023-01-01 ENCOUNTER — APPOINTMENT (INPATIENT)
Dept: NON INVASIVE DIAGNOSTICS | Facility: HOSPITAL | Age: 73
DRG: 291 | End: 2023-01-01
Payer: MEDICARE

## 2023-01-01 ENCOUNTER — APPOINTMENT (EMERGENCY)
Dept: RADIOLOGY | Facility: HOSPITAL | Age: 73
DRG: 291 | End: 2023-01-01
Payer: MEDICARE

## 2023-01-01 ENCOUNTER — APPOINTMENT (EMERGENCY)
Dept: CT IMAGING | Facility: HOSPITAL | Age: 73
DRG: 291 | End: 2023-01-01
Payer: MEDICARE

## 2023-01-01 ENCOUNTER — HOME CARE VISIT (OUTPATIENT)
Dept: HOME HEALTH SERVICES | Facility: HOME HEALTHCARE | Age: 73
End: 2023-01-01

## 2023-01-01 ENCOUNTER — HOSPITAL ENCOUNTER (INPATIENT)
Facility: HOSPITAL | Age: 73
LOS: 6 days | DRG: 291 | End: 2023-08-03
Attending: EMERGENCY MEDICINE | Admitting: FAMILY MEDICINE
Payer: MEDICARE

## 2023-01-01 VITALS
TEMPERATURE: 95.9 F | HEIGHT: 64 IN | SYSTOLIC BLOOD PRESSURE: 91 MMHG | OXYGEN SATURATION: 90 % | DIASTOLIC BLOOD PRESSURE: 59 MMHG | BODY MASS INDEX: 50.02 KG/M2 | HEART RATE: 64 BPM | WEIGHT: 293 LBS | RESPIRATION RATE: 20 BRPM

## 2023-01-01 DIAGNOSIS — E87.1 ACUTE HYPONATREMIA: ICD-10-CM

## 2023-01-01 DIAGNOSIS — I50.9 DECOMPENSATED HEART FAILURE (HCC): ICD-10-CM

## 2023-01-01 DIAGNOSIS — E87.1 HYPONATREMIA: ICD-10-CM

## 2023-01-01 DIAGNOSIS — I50.9 ACUTE DECOMPENSATED HEART FAILURE (HCC): Primary | ICD-10-CM

## 2023-01-01 DIAGNOSIS — R41.82 AMS (ALTERED MENTAL STATUS): ICD-10-CM

## 2023-01-01 DIAGNOSIS — I50.9 CHF (CONGESTIVE HEART FAILURE) (HCC): ICD-10-CM

## 2023-01-01 DIAGNOSIS — R60.1 ANASARCA: ICD-10-CM

## 2023-01-01 DIAGNOSIS — G93.40 ACUTE ENCEPHALOPATHY: ICD-10-CM

## 2023-01-01 LAB
2HR DELTA HS TROPONIN: -6 NG/L
ALBUMIN SERPL BCP-MCNC: 3.2 G/DL (ref 3.5–5)
ALP SERPL-CCNC: 152 U/L (ref 34–104)
ALT SERPL W P-5'-P-CCNC: 28 U/L (ref 7–52)
ANION GAP SERPL CALCULATED.3IONS-SCNC: 14 MMOL/L
APTT PPP: 36 SECONDS (ref 23–37)
ARTERIAL PATENCY WRIST A: YES
AST SERPL W P-5'-P-CCNC: 27 U/L (ref 13–39)
ATRIAL RATE: 81 BPM
ATRIAL RATE: 85 BPM
BACTERIA BLD CULT: NORMAL
BACTERIA BLD CULT: NORMAL
BASE EXCESS BLDA CALC-SCNC: -3.9 MMOL/L
BASOPHILS # BLD AUTO: 0.02 THOUSANDS/ÂΜL (ref 0–0.1)
BASOPHILS NFR BLD AUTO: 0 % (ref 0–1)
BILIRUB SERPL-MCNC: 4.61 MG/DL (ref 0.2–1)
BNP SERPL-MCNC: 2348 PG/ML (ref 0–100)
BUN SERPL-MCNC: 28 MG/DL (ref 5–25)
CALCIUM ALBUM COR SERPL-MCNC: 9.3 MG/DL (ref 8.3–10.1)
CALCIUM SERPL-MCNC: 8.7 MG/DL (ref 8.4–10.2)
CARDIAC TROPONIN I PNL SERPL HS: 33 NG/L
CARDIAC TROPONIN I PNL SERPL HS: 39 NG/L
CHLORIDE SERPL-SCNC: 89 MMOL/L (ref 96–108)
CK SERPL-CCNC: 29 U/L (ref 26–192)
CO2 SERPL-SCNC: 18 MMOL/L (ref 21–32)
CREAT SERPL-MCNC: 1.29 MG/DL (ref 0.6–1.3)
EOSINOPHIL # BLD AUTO: 0.03 THOUSAND/ÂΜL (ref 0–0.61)
EOSINOPHIL NFR BLD AUTO: 0 % (ref 0–6)
ERYTHROCYTE [DISTWIDTH] IN BLOOD BY AUTOMATED COUNT: 22 % (ref 11.6–15.1)
FLUAV RNA RESP QL NAA+PROBE: NEGATIVE
FLUBV RNA RESP QL NAA+PROBE: NEGATIVE
GFR SERPL CREATININE-BSD FRML MDRD: 41 ML/MIN/1.73SQ M
GLUCOSE SERPL-MCNC: 135 MG/DL (ref 65–140)
GLUCOSE SERPL-MCNC: 140 MG/DL (ref 65–140)
GLUCOSE SERPL-MCNC: 148 MG/DL (ref 65–140)
HCO3 BLDA-SCNC: 18.5 MMOL/L (ref 22–28)
HCT VFR BLD AUTO: 44.7 % (ref 34.8–46.1)
HGB BLD-MCNC: 15.2 G/DL (ref 11.5–15.4)
IMM GRANULOCYTES # BLD AUTO: 0.07 THOUSAND/UL (ref 0–0.2)
IMM GRANULOCYTES NFR BLD AUTO: 1 % (ref 0–2)
INR PPP: 1.9 (ref 0.84–1.19)
LACTATE SERPL-SCNC: 2.8 MMOL/L (ref 0.5–2)
LACTATE SERPL-SCNC: 3 MMOL/L (ref 0.5–2)
LIPASE SERPL-CCNC: 14 U/L (ref 11–82)
LYMPHOCYTES # BLD AUTO: 1.14 THOUSANDS/ÂΜL (ref 0.6–4.47)
LYMPHOCYTES NFR BLD AUTO: 11 % (ref 14–44)
MAGNESIUM SERPL-MCNC: 1.7 MG/DL (ref 1.9–2.7)
MCH RBC QN AUTO: 26.9 PG (ref 26.8–34.3)
MCHC RBC AUTO-ENTMCNC: 34 G/DL (ref 31.4–37.4)
MCV RBC AUTO: 79 FL (ref 82–98)
MONOCYTES # BLD AUTO: 0.28 THOUSAND/ÂΜL (ref 0.17–1.22)
MONOCYTES NFR BLD AUTO: 3 % (ref 4–12)
NASAL CANNULA: 2
NEUTROPHILS # BLD AUTO: 8.75 THOUSANDS/ÂΜL (ref 1.85–7.62)
NEUTS SEG NFR BLD AUTO: 85 % (ref 43–75)
NRBC BLD AUTO-RTO: 2 /100 WBCS
O2 CT BLDA-SCNC: 22.1 ML/DL (ref 16–23)
OSMOLALITY UR/SERPL-RTO: 267 MMOL/KG (ref 282–298)
OXYHGB MFR BLDA: 97.7 % (ref 94–97)
P AXIS: 76 DEGREES
P AXIS: 84 DEGREES
PCO2 BLDA: 28 MM HG (ref 36–44)
PH BLDA: 7.44 [PH] (ref 7.35–7.45)
PLATELET # BLD AUTO: 149 THOUSANDS/UL (ref 149–390)
PMV BLD AUTO: 10.8 FL (ref 8.9–12.7)
PO2 BLDA: 150.8 MM HG (ref 75–129)
POTASSIUM SERPL-SCNC: 4.3 MMOL/L (ref 3.5–5.3)
PR INTERVAL: 168 MS
PR INTERVAL: 196 MS
PROCALCITONIN SERPL-MCNC: 0.28 NG/ML
PROT SERPL-MCNC: 5.3 G/DL (ref 6.4–8.4)
PROTHROMBIN TIME: 21.9 SECONDS (ref 11.6–14.5)
QRS AXIS: 140 DEGREES
QRS AXIS: 39 DEGREES
QRSD INTERVAL: 90 MS
QRSD INTERVAL: 94 MS
QT INTERVAL: 362 MS
QT INTERVAL: 404 MS
QTC INTERVAL: 430 MS
QTC INTERVAL: 469 MS
RBC # BLD AUTO: 5.65 MILLION/UL (ref 3.81–5.12)
RSV RNA RESP QL NAA+PROBE: NEGATIVE
SARS-COV-2 RNA RESP QL NAA+PROBE: NEGATIVE
SODIUM SERPL-SCNC: 121 MMOL/L (ref 135–147)
SPECIMEN SOURCE: ABNORMAL
T WAVE AXIS: 55 DEGREES
T WAVE AXIS: 73 DEGREES
VENTRICULAR RATE: 81 BPM
VENTRICULAR RATE: 85 BPM
WBC # BLD AUTO: 10.29 THOUSAND/UL (ref 4.31–10.16)

## 2023-01-01 PROCEDURE — G1004 CDSM NDSC: HCPCS

## 2023-01-01 PROCEDURE — 84145 PROCALCITONIN (PCT): CPT | Performed by: STUDENT IN AN ORGANIZED HEALTH CARE EDUCATION/TRAINING PROGRAM

## 2023-01-01 PROCEDURE — 83690 ASSAY OF LIPASE: CPT | Performed by: EMERGENCY MEDICINE

## 2023-01-01 PROCEDURE — 70450 CT HEAD/BRAIN W/O DYE: CPT

## 2023-01-01 PROCEDURE — 36556 INSERT NON-TUNNEL CV CATH: CPT | Performed by: NURSE PRACTITIONER

## 2023-01-01 PROCEDURE — 05HF33Z INSERTION OF INFUSION DEVICE INTO LEFT CEPHALIC VEIN, PERCUTANEOUS APPROACH: ICD-10-PCS | Performed by: STUDENT IN AN ORGANIZED HEALTH CARE EDUCATION/TRAINING PROGRAM

## 2023-01-01 PROCEDURE — 99233 SBSQ HOSP IP/OBS HIGH 50: CPT | Performed by: STUDENT IN AN ORGANIZED HEALTH CARE EDUCATION/TRAINING PROGRAM

## 2023-01-01 PROCEDURE — 83605 ASSAY OF LACTIC ACID: CPT | Performed by: EMERGENCY MEDICINE

## 2023-01-01 PROCEDURE — 0241U HB NFCT DS VIR RESP RNA 4 TRGT: CPT | Performed by: EMERGENCY MEDICINE

## 2023-01-01 PROCEDURE — 05HA33Z INSERTION OF INFUSION DEVICE INTO LEFT BRACHIAL VEIN, PERCUTANEOUS APPROACH: ICD-10-PCS | Performed by: STUDENT IN AN ORGANIZED HEALTH CARE EDUCATION/TRAINING PROGRAM

## 2023-01-01 PROCEDURE — 96374 THER/PROPH/DIAG INJ IV PUSH: CPT

## 2023-01-01 PROCEDURE — 99232 SBSQ HOSP IP/OBS MODERATE 35: CPT | Performed by: FAMILY MEDICINE

## 2023-01-01 PROCEDURE — 83930 ASSAY OF BLOOD OSMOLALITY: CPT | Performed by: NURSE PRACTITIONER

## 2023-01-01 PROCEDURE — 93925 LOWER EXTREMITY STUDY: CPT | Performed by: SURGERY

## 2023-01-01 PROCEDURE — 99223 1ST HOSP IP/OBS HIGH 75: CPT | Performed by: FAMILY MEDICINE

## 2023-01-01 PROCEDURE — 83735 ASSAY OF MAGNESIUM: CPT | Performed by: EMERGENCY MEDICINE

## 2023-01-01 PROCEDURE — 99497 ADVNCD CARE PLAN 30 MIN: CPT | Performed by: FAMILY MEDICINE

## 2023-01-01 PROCEDURE — 36000 PLACE NEEDLE IN VEIN: CPT | Performed by: NURSE PRACTITIONER

## 2023-01-01 PROCEDURE — 99285 EMERGENCY DEPT VISIT HI MDM: CPT

## 2023-01-01 PROCEDURE — 99291 CRITICAL CARE FIRST HOUR: CPT | Performed by: PHYSICIAN ASSISTANT

## 2023-01-01 PROCEDURE — 85730 THROMBOPLASTIN TIME PARTIAL: CPT | Performed by: EMERGENCY MEDICINE

## 2023-01-01 PROCEDURE — 99223 1ST HOSP IP/OBS HIGH 75: CPT | Performed by: INTERNAL MEDICINE

## 2023-01-01 PROCEDURE — 99239 HOSP IP/OBS DSCHRG MGMT >30: CPT | Performed by: NURSE PRACTITIONER

## 2023-01-01 PROCEDURE — 93308 TTE F-UP OR LMTD: CPT | Performed by: STUDENT IN AN ORGANIZED HEALTH CARE EDUCATION/TRAINING PROGRAM

## 2023-01-01 PROCEDURE — 93923 UPR/LXTR ART STDY 3+ LVLS: CPT

## 2023-01-01 PROCEDURE — 82948 REAGENT STRIP/BLOOD GLUCOSE: CPT

## 2023-01-01 PROCEDURE — 84484 ASSAY OF TROPONIN QUANT: CPT | Performed by: EMERGENCY MEDICINE

## 2023-01-01 PROCEDURE — 85610 PROTHROMBIN TIME: CPT | Performed by: EMERGENCY MEDICINE

## 2023-01-01 PROCEDURE — 93005 ELECTROCARDIOGRAM TRACING: CPT

## 2023-01-01 PROCEDURE — 36600 WITHDRAWAL OF ARTERIAL BLOOD: CPT

## 2023-01-01 PROCEDURE — 74176 CT ABD & PELVIS W/O CONTRAST: CPT

## 2023-01-01 PROCEDURE — 93925 LOWER EXTREMITY STUDY: CPT

## 2023-01-01 PROCEDURE — 83880 ASSAY OF NATRIURETIC PEPTIDE: CPT | Performed by: EMERGENCY MEDICINE

## 2023-01-01 PROCEDURE — 80053 COMPREHEN METABOLIC PANEL: CPT | Performed by: EMERGENCY MEDICINE

## 2023-01-01 PROCEDURE — NC001 PR NO CHARGE: Performed by: PHYSICIAN ASSISTANT

## 2023-01-01 PROCEDURE — 93923 UPR/LXTR ART STDY 3+ LVLS: CPT | Performed by: SURGERY

## 2023-01-01 PROCEDURE — 71250 CT THORAX DX C-: CPT

## 2023-01-01 PROCEDURE — 82805 BLOOD GASES W/O2 SATURATION: CPT | Performed by: PHYSICIAN ASSISTANT

## 2023-01-01 PROCEDURE — 99285 EMERGENCY DEPT VISIT HI MDM: CPT | Performed by: EMERGENCY MEDICINE

## 2023-01-01 PROCEDURE — 87040 BLOOD CULTURE FOR BACTERIA: CPT | Performed by: EMERGENCY MEDICINE

## 2023-01-01 PROCEDURE — 82550 ASSAY OF CK (CPK): CPT | Performed by: EMERGENCY MEDICINE

## 2023-01-01 PROCEDURE — NC001 PR NO CHARGE: Performed by: NURSE PRACTITIONER

## 2023-01-01 PROCEDURE — NC001 PR NO CHARGE: Performed by: STUDENT IN AN ORGANIZED HEALTH CARE EDUCATION/TRAINING PROGRAM

## 2023-01-01 PROCEDURE — 36415 COLL VENOUS BLD VENIPUNCTURE: CPT | Performed by: EMERGENCY MEDICINE

## 2023-01-01 PROCEDURE — 85025 COMPLETE CBC W/AUTO DIFF WBC: CPT | Performed by: EMERGENCY MEDICINE

## 2023-01-01 RX ORDER — FUROSEMIDE 10 MG/ML
80 INJECTION INTRAMUSCULAR; INTRAVENOUS ONCE
Status: COMPLETED | OUTPATIENT
Start: 2023-01-01 | End: 2023-01-01

## 2023-01-01 RX ORDER — HYDROMORPHONE HCL/PF 1 MG/ML
0.3 SYRINGE (ML) INJECTION
Status: DISCONTINUED | OUTPATIENT
Start: 2023-01-01 | End: 2023-01-01

## 2023-01-01 RX ORDER — FUROSEMIDE 10 MG/ML
40 SYRINGE (ML) INJECTION CONTINUOUS
Status: DISCONTINUED | OUTPATIENT
Start: 2023-01-01 | End: 2023-01-01

## 2023-01-01 RX ORDER — HYDROMORPHONE HCL/PF 1 MG/ML
0.5 SYRINGE (ML) INJECTION
Status: DISCONTINUED | OUTPATIENT
Start: 2023-01-01 | End: 2023-08-03 | Stop reason: HOSPADM

## 2023-01-01 RX ORDER — FUROSEMIDE 10 MG/ML
80 INJECTION INTRAMUSCULAR; INTRAVENOUS
Status: DISCONTINUED | OUTPATIENT
Start: 2023-01-01 | End: 2023-01-01

## 2023-01-01 RX ORDER — SCOLOPAMINE TRANSDERMAL SYSTEM 1 MG/1
1 PATCH, EXTENDED RELEASE TRANSDERMAL
Status: DISCONTINUED | OUTPATIENT
Start: 2023-01-01 | End: 2023-08-03 | Stop reason: HOSPADM

## 2023-01-01 RX ORDER — HYDROMORPHONE HCL/PF 1 MG/ML
0.3 SYRINGE (ML) INJECTION EVERY 2 HOUR PRN
Status: DISCONTINUED | OUTPATIENT
Start: 2023-01-01 | End: 2023-01-01

## 2023-01-01 RX ORDER — HALOPERIDOL 5 MG/ML
0.5 INJECTION INTRAMUSCULAR EVERY 2 HOUR PRN
Status: DISCONTINUED | OUTPATIENT
Start: 2023-01-01 | End: 2023-08-03 | Stop reason: HOSPADM

## 2023-01-01 RX ORDER — GLYCOPYRROLATE 0.2 MG/ML
0.1 INJECTION INTRAMUSCULAR; INTRAVENOUS EVERY 4 HOURS PRN
Status: DISCONTINUED | OUTPATIENT
Start: 2023-01-01 | End: 2023-08-03 | Stop reason: HOSPADM

## 2023-01-01 RX ORDER — INSULIN LISPRO 100 [IU]/ML
2-12 INJECTION, SOLUTION INTRAVENOUS; SUBCUTANEOUS
Status: DISCONTINUED | OUTPATIENT
Start: 2023-01-01 | End: 2023-01-01

## 2023-01-01 RX ORDER — LEVOTHYROXINE SODIUM 175 UG/1
175 TABLET ORAL
Status: DISCONTINUED | OUTPATIENT
Start: 2023-01-01 | End: 2023-01-01

## 2023-01-01 RX ORDER — MAGNESIUM SULFATE 1 G/100ML
1 INJECTION INTRAVENOUS ONCE
Status: COMPLETED | OUTPATIENT
Start: 2023-01-01 | End: 2023-01-01

## 2023-01-01 RX ORDER — FUROSEMIDE 10 MG/ML
40 INJECTION INTRAMUSCULAR; INTRAVENOUS ONCE
Status: COMPLETED | OUTPATIENT
Start: 2023-01-01 | End: 2023-01-01

## 2023-01-01 RX ORDER — LORAZEPAM 2 MG/ML
0.5 INJECTION INTRAMUSCULAR EVERY 4 HOURS PRN
Status: DISCONTINUED | OUTPATIENT
Start: 2023-01-01 | End: 2023-08-03 | Stop reason: HOSPADM

## 2023-01-01 RX ADMIN — HYDROMORPHONE HYDROCHLORIDE 0.5 MG: 1 INJECTION, SOLUTION INTRAMUSCULAR; INTRAVENOUS; SUBCUTANEOUS at 19:21

## 2023-01-01 RX ADMIN — HYDROMORPHONE HYDROCHLORIDE 0.5 MG: 1 INJECTION, SOLUTION INTRAMUSCULAR; INTRAVENOUS; SUBCUTANEOUS at 05:13

## 2023-01-01 RX ADMIN — HYDROMORPHONE HYDROCHLORIDE 0.3 MG: 1 INJECTION, SOLUTION INTRAMUSCULAR; INTRAVENOUS; SUBCUTANEOUS at 23:15

## 2023-01-01 RX ADMIN — FUROSEMIDE 40 MG: 10 INJECTION, SOLUTION INTRAMUSCULAR; INTRAVENOUS at 04:12

## 2023-01-01 RX ADMIN — HALOPERIDOL LACTATE 0.5 MG: 5 INJECTION INTRAMUSCULAR at 02:02

## 2023-01-01 RX ADMIN — GLYCOPYRROLATE 0.1 MG: 0.2 INJECTION, SOLUTION INTRAMUSCULAR; INTRAVENOUS at 19:53

## 2023-01-01 RX ADMIN — HYDROMORPHONE HYDROCHLORIDE 0.5 MG: 1 INJECTION, SOLUTION INTRAMUSCULAR; INTRAVENOUS; SUBCUTANEOUS at 12:07

## 2023-01-01 RX ADMIN — GLYCOPYRROLATE 0.1 MG: 0.2 INJECTION, SOLUTION INTRAMUSCULAR; INTRAVENOUS at 05:12

## 2023-01-01 RX ADMIN — HYDROMORPHONE HYDROCHLORIDE 0.3 MG: 1 INJECTION, SOLUTION INTRAMUSCULAR; INTRAVENOUS; SUBCUTANEOUS at 09:04

## 2023-01-01 RX ADMIN — LORAZEPAM 0.5 MG: 2 INJECTION INTRAMUSCULAR; INTRAVENOUS at 19:48

## 2023-01-01 RX ADMIN — HYDROMORPHONE HYDROCHLORIDE 0.3 MG: 1 INJECTION, SOLUTION INTRAMUSCULAR; INTRAVENOUS; SUBCUTANEOUS at 01:02

## 2023-01-01 RX ADMIN — GLYCOPYRROLATE 0.1 MG: 0.2 INJECTION, SOLUTION INTRAMUSCULAR; INTRAVENOUS at 01:34

## 2023-01-01 RX ADMIN — HYDROMORPHONE HYDROCHLORIDE 0.5 MG: 1 INJECTION, SOLUTION INTRAMUSCULAR; INTRAVENOUS; SUBCUTANEOUS at 03:32

## 2023-01-01 RX ADMIN — HYDROMORPHONE HYDROCHLORIDE 0.5 MG: 1 INJECTION, SOLUTION INTRAMUSCULAR; INTRAVENOUS; SUBCUTANEOUS at 17:49

## 2023-01-01 RX ADMIN — GLYCOPYRROLATE 0.1 MG: 0.2 INJECTION, SOLUTION INTRAMUSCULAR; INTRAVENOUS at 19:26

## 2023-01-01 RX ADMIN — MAGNESIUM SULFATE HEPTAHYDRATE 1 G: 1 INJECTION, SOLUTION INTRAVENOUS at 10:03

## 2023-01-01 RX ADMIN — GLYCOPYRROLATE 0.1 MG: 0.2 INJECTION, SOLUTION INTRAMUSCULAR; INTRAVENOUS at 00:33

## 2023-01-01 RX ADMIN — CHLOROTHIAZIDE SODIUM 500 MG: 500 INJECTION, POWDER, LYOPHILIZED, FOR SOLUTION INTRAVENOUS at 14:37

## 2023-01-01 RX ADMIN — HYDROMORPHONE HYDROCHLORIDE 0.3 MG: 1 INJECTION, SOLUTION INTRAMUSCULAR; INTRAVENOUS; SUBCUTANEOUS at 06:52

## 2023-01-01 RX ADMIN — GLYCOPYRROLATE 0.1 MG: 0.2 INJECTION, SOLUTION INTRAMUSCULAR; INTRAVENOUS at 15:02

## 2023-01-01 RX ADMIN — HYDROMORPHONE HYDROCHLORIDE 0.3 MG: 1 INJECTION, SOLUTION INTRAMUSCULAR; INTRAVENOUS; SUBCUTANEOUS at 13:42

## 2023-01-01 RX ADMIN — HYDROMORPHONE HYDROCHLORIDE 0.5 MG: 1 INJECTION, SOLUTION INTRAMUSCULAR; INTRAVENOUS; SUBCUTANEOUS at 05:21

## 2023-01-01 RX ADMIN — SCOPALAMINE 1 PATCH: 1 PATCH, EXTENDED RELEASE TRANSDERMAL at 12:01

## 2023-01-01 RX ADMIN — HYDROMORPHONE HYDROCHLORIDE 0.3 MG: 1 INJECTION, SOLUTION INTRAMUSCULAR; INTRAVENOUS; SUBCUTANEOUS at 01:34

## 2023-01-01 RX ADMIN — HYDROMORPHONE HYDROCHLORIDE 0.5 MG: 1 INJECTION, SOLUTION INTRAMUSCULAR; INTRAVENOUS; SUBCUTANEOUS at 10:29

## 2023-01-01 RX ADMIN — HYDROMORPHONE HYDROCHLORIDE 0.3 MG: 1 INJECTION, SOLUTION INTRAMUSCULAR; INTRAVENOUS; SUBCUTANEOUS at 20:20

## 2023-01-01 RX ADMIN — GLYCOPYRROLATE 0.1 MG: 0.2 INJECTION, SOLUTION INTRAMUSCULAR; INTRAVENOUS at 05:20

## 2023-01-01 RX ADMIN — FUROSEMIDE 120 MG: 10 INJECTION, SOLUTION INTRAMUSCULAR; INTRAVENOUS at 14:35

## 2023-01-01 RX ADMIN — FUROSEMIDE 80 MG: 10 INJECTION, SOLUTION INTRAMUSCULAR; INTRAVENOUS at 10:13

## 2023-01-01 RX ADMIN — HYDROMORPHONE HYDROCHLORIDE 0.3 MG: 1 INJECTION, SOLUTION INTRAMUSCULAR; INTRAVENOUS; SUBCUTANEOUS at 11:34

## 2023-01-01 RX ADMIN — HYDROMORPHONE HYDROCHLORIDE 0.5 MG: 1 INJECTION, SOLUTION INTRAMUSCULAR; INTRAVENOUS; SUBCUTANEOUS at 18:47

## 2023-01-01 RX ADMIN — HYDROMORPHONE HYDROCHLORIDE 0.3 MG: 1 INJECTION, SOLUTION INTRAMUSCULAR; INTRAVENOUS; SUBCUTANEOUS at 18:15

## 2023-01-01 RX ADMIN — HYDROMORPHONE HYDROCHLORIDE 0.3 MG: 1 INJECTION, SOLUTION INTRAMUSCULAR; INTRAVENOUS; SUBCUTANEOUS at 20:31

## 2023-04-18 ENCOUNTER — APPOINTMENT (EMERGENCY)
Dept: ULTRASOUND IMAGING | Facility: HOSPITAL | Age: 73
End: 2023-04-18

## 2023-04-18 ENCOUNTER — HOSPITAL ENCOUNTER (INPATIENT)
Facility: HOSPITAL | Age: 73
LOS: 7 days | Discharge: HOME HEALTH | End: 2023-04-25
Attending: EMERGENCY MEDICINE | Admitting: FAMILY MEDICINE

## 2023-04-18 DIAGNOSIS — I50.40 COMBINED SYSTOLIC AND DIASTOLIC CONGESTIVE HEART FAILURE, UNSPECIFIED HF CHRONICITY (HCC): ICD-10-CM

## 2023-04-18 DIAGNOSIS — R79.89 ELEVATED LFTS: ICD-10-CM

## 2023-04-18 DIAGNOSIS — R11.2 NAUSEA & VOMITING: Primary | ICD-10-CM

## 2023-04-18 DIAGNOSIS — I48.91 ATRIAL FIBRILLATION, UNSPECIFIED TYPE (HCC): ICD-10-CM

## 2023-04-18 DIAGNOSIS — R10.13 EPIGASTRIC PAIN: ICD-10-CM

## 2023-04-18 DIAGNOSIS — R79.1 SUPRATHERAPEUTIC INR: ICD-10-CM

## 2023-04-18 LAB
ALBUMIN SERPL BCP-MCNC: 4 G/DL (ref 3.5–5)
ALBUMIN SERPL BCP-MCNC: 4 G/DL (ref 3.5–5)
ALP SERPL-CCNC: 133 U/L (ref 34–104)
ALP SERPL-CCNC: 133 U/L (ref 34–104)
ALT SERPL W P-5'-P-CCNC: 207 U/L (ref 7–52)
ALT SERPL W P-5'-P-CCNC: 212 U/L (ref 7–52)
ANION GAP SERPL CALCULATED.3IONS-SCNC: 12 MMOL/L (ref 4–13)
ANION GAP SERPL CALCULATED.3IONS-SCNC: 13 MMOL/L (ref 4–13)
APAP SERPL-MCNC: <10 UG/ML (ref 10–20)
AST SERPL W P-5'-P-CCNC: 137 U/L (ref 13–39)
AST SERPL W P-5'-P-CCNC: 151 U/L (ref 13–39)
BASOPHILS # BLD AUTO: 0.06 THOUSANDS/ΜL (ref 0–0.1)
BASOPHILS NFR BLD AUTO: 1 % (ref 0–1)
BILIRUB SERPL-MCNC: 1.7 MG/DL (ref 0.2–1)
BILIRUB SERPL-MCNC: 1.73 MG/DL (ref 0.2–1)
BUN SERPL-MCNC: 16 MG/DL (ref 5–25)
BUN SERPL-MCNC: 16 MG/DL (ref 5–25)
CALCIUM SERPL-MCNC: 9 MG/DL (ref 8.4–10.2)
CALCIUM SERPL-MCNC: 9.2 MG/DL (ref 8.4–10.2)
CHLORIDE SERPL-SCNC: 98 MMOL/L (ref 96–108)
CHLORIDE SERPL-SCNC: 99 MMOL/L (ref 96–108)
CO2 SERPL-SCNC: 20 MMOL/L (ref 21–32)
CO2 SERPL-SCNC: 26 MMOL/L (ref 21–32)
CREAT SERPL-MCNC: 1.05 MG/DL (ref 0.6–1.3)
CREAT SERPL-MCNC: 1.07 MG/DL (ref 0.6–1.3)
EOSINOPHIL # BLD AUTO: 0.03 THOUSAND/ΜL (ref 0–0.61)
EOSINOPHIL NFR BLD AUTO: 0 % (ref 0–6)
ERYTHROCYTE [DISTWIDTH] IN BLOOD BY AUTOMATED COUNT: 19.4 % (ref 11.6–15.1)
GFR SERPL CREATININE-BSD FRML MDRD: 51 ML/MIN/1.73SQ M
GFR SERPL CREATININE-BSD FRML MDRD: 53 ML/MIN/1.73SQ M
GLUCOSE SERPL-MCNC: 115 MG/DL (ref 65–140)
GLUCOSE SERPL-MCNC: 177 MG/DL (ref 65–140)
GLUCOSE SERPL-MCNC: 178 MG/DL (ref 65–140)
GLUCOSE SERPL-MCNC: 192 MG/DL (ref 65–140)
HCT VFR BLD AUTO: 48.5 % (ref 34.8–46.1)
HGB BLD-MCNC: 14.3 G/DL (ref 11.5–15.4)
IMM GRANULOCYTES # BLD AUTO: 0.05 THOUSAND/UL (ref 0–0.2)
IMM GRANULOCYTES NFR BLD AUTO: 1 % (ref 0–2)
INR PPP: 5.98 (ref 0.84–1.19)
LACTATE SERPL-SCNC: 1.7 MMOL/L (ref 0.5–2)
LACTATE SERPL-SCNC: 2.1 MMOL/L (ref 0.5–2)
LIPASE SERPL-CCNC: 8 U/L (ref 11–82)
LYMPHOCYTES # BLD AUTO: 1.62 THOUSANDS/ΜL (ref 0.6–4.47)
LYMPHOCYTES NFR BLD AUTO: 18 % (ref 14–44)
MAGNESIUM SERPL-MCNC: 1.6 MG/DL (ref 1.9–2.7)
MCH RBC QN AUTO: 25.5 PG (ref 26.8–34.3)
MCHC RBC AUTO-ENTMCNC: 29.5 G/DL (ref 31.4–37.4)
MCV RBC AUTO: 87 FL (ref 82–98)
MONOCYTES # BLD AUTO: 0.65 THOUSAND/ΜL (ref 0.17–1.22)
MONOCYTES NFR BLD AUTO: 7 % (ref 4–12)
NEUTROPHILS # BLD AUTO: 6.55 THOUSANDS/ΜL (ref 1.85–7.62)
NEUTS SEG NFR BLD AUTO: 73 % (ref 43–75)
NRBC BLD AUTO-RTO: 0 /100 WBCS
PLATELET # BLD AUTO: 309 THOUSANDS/UL (ref 149–390)
PMV BLD AUTO: 10.2 FL (ref 8.9–12.7)
POTASSIUM SERPL-SCNC: 3.9 MMOL/L (ref 3.5–5.3)
POTASSIUM SERPL-SCNC: 4.1 MMOL/L (ref 3.5–5.3)
POTASSIUM SERPL-SCNC: 6 MMOL/L (ref 3.5–5.3)
PROCALCITONIN SERPL-MCNC: 0.14 NG/ML
PROT SERPL-MCNC: 6.8 G/DL (ref 6.4–8.4)
PROT SERPL-MCNC: 6.9 G/DL (ref 6.4–8.4)
PROTHROMBIN TIME: 53.2 SECONDS (ref 11.6–14.5)
RBC # BLD AUTO: 5.6 MILLION/UL (ref 3.81–5.12)
SODIUM SERPL-SCNC: 132 MMOL/L (ref 135–147)
SODIUM SERPL-SCNC: 136 MMOL/L (ref 135–147)
WBC # BLD AUTO: 8.96 THOUSAND/UL (ref 4.31–10.16)

## 2023-04-18 RX ORDER — FUROSEMIDE 20 MG/1
20 TABLET ORAL AS NEEDED
COMMUNITY

## 2023-04-18 RX ORDER — METOPROLOL SUCCINATE 100 MG/1
100 TABLET, EXTENDED RELEASE ORAL 2 TIMES DAILY
COMMUNITY

## 2023-04-18 RX ORDER — METOCLOPRAMIDE HYDROCHLORIDE 5 MG/ML
10 INJECTION INTRAMUSCULAR; INTRAVENOUS ONCE
Status: COMPLETED | OUTPATIENT
Start: 2023-04-18 | End: 2023-04-18

## 2023-04-18 RX ORDER — POLYETHYLENE GLYCOL 3350 17 G/17G
17 POWDER, FOR SOLUTION ORAL DAILY PRN
Status: DISCONTINUED | OUTPATIENT
Start: 2023-04-18 | End: 2023-04-19

## 2023-04-18 RX ORDER — INSULIN LISPRO 100 [IU]/ML
2-12 INJECTION, SOLUTION INTRAVENOUS; SUBCUTANEOUS
Status: DISCONTINUED | OUTPATIENT
Start: 2023-04-18 | End: 2023-04-25 | Stop reason: HOSPADM

## 2023-04-18 RX ORDER — ACETAMINOPHEN 325 MG/1
650 TABLET ORAL EVERY 6 HOURS PRN
Status: DISCONTINUED | OUTPATIENT
Start: 2023-04-18 | End: 2023-04-18

## 2023-04-18 RX ORDER — SODIUM CHLORIDE 9 MG/ML
75 INJECTION, SOLUTION INTRAVENOUS CONTINUOUS
Status: DISCONTINUED | OUTPATIENT
Start: 2023-04-18 | End: 2023-04-18

## 2023-04-18 RX ORDER — VENLAFAXINE HYDROCHLORIDE 150 MG/1
1 CAPSULE, EXTENDED RELEASE ORAL DAILY
COMMUNITY
Start: 2023-04-03

## 2023-04-18 RX ORDER — METOPROLOL SUCCINATE 100 MG/1
100 TABLET, EXTENDED RELEASE ORAL 2 TIMES DAILY
Status: DISCONTINUED | OUTPATIENT
Start: 2023-04-18 | End: 2023-04-25 | Stop reason: HOSPADM

## 2023-04-18 RX ORDER — WARFARIN SODIUM 5 MG/1
5 TABLET ORAL
COMMUNITY

## 2023-04-18 RX ORDER — METFORMIN HYDROCHLORIDE 500 MG/1
500 TABLET, EXTENDED RELEASE ORAL
COMMUNITY
Start: 2022-11-10

## 2023-04-18 RX ORDER — LEVOTHYROXINE SODIUM 175 UG/1
175 TABLET ORAL DAILY
Status: DISCONTINUED | OUTPATIENT
Start: 2023-04-19 | End: 2023-04-25 | Stop reason: HOSPADM

## 2023-04-18 RX ORDER — SPIRONOLACTONE 25 MG/1
25 TABLET ORAL DAILY
COMMUNITY

## 2023-04-18 RX ORDER — LOSARTAN POTASSIUM 100 MG/1
1 TABLET ORAL DAILY
COMMUNITY
Start: 2023-02-15

## 2023-04-18 RX ORDER — METOPROLOL TARTRATE 5 MG/5ML
5 INJECTION INTRAVENOUS EVERY 6 HOURS
Status: DISCONTINUED | OUTPATIENT
Start: 2023-04-18 | End: 2023-04-18

## 2023-04-18 RX ORDER — PANTOPRAZOLE SODIUM 40 MG/10ML
40 INJECTION, POWDER, LYOPHILIZED, FOR SOLUTION INTRAVENOUS EVERY 12 HOURS SCHEDULED
Status: DISCONTINUED | OUTPATIENT
Start: 2023-04-18 | End: 2023-04-25 | Stop reason: HOSPADM

## 2023-04-18 RX ORDER — SODIUM CHLORIDE, SODIUM GLUCONATE, SODIUM ACETATE, POTASSIUM CHLORIDE, MAGNESIUM CHLORIDE, SODIUM PHOSPHATE, DIBASIC, AND POTASSIUM PHOSPHATE .53; .5; .37; .037; .03; .012; .00082 G/100ML; G/100ML; G/100ML; G/100ML; G/100ML; G/100ML; G/100ML
75 INJECTION, SOLUTION INTRAVENOUS CONTINUOUS
Status: DISCONTINUED | OUTPATIENT
Start: 2023-04-18 | End: 2023-04-19

## 2023-04-18 RX ORDER — MAGNESIUM HYDROXIDE/ALUMINUM HYDROXICE/SIMETHICONE 120; 1200; 1200 MG/30ML; MG/30ML; MG/30ML
30 SUSPENSION ORAL EVERY 6 HOURS PRN
Status: DISCONTINUED | OUTPATIENT
Start: 2023-04-18 | End: 2023-04-25 | Stop reason: HOSPADM

## 2023-04-18 RX ORDER — ONDANSETRON 2 MG/ML
4 INJECTION INTRAMUSCULAR; INTRAVENOUS EVERY 6 HOURS PRN
Status: DISCONTINUED | OUTPATIENT
Start: 2023-04-18 | End: 2023-04-25 | Stop reason: HOSPADM

## 2023-04-18 RX ORDER — ONDANSETRON 2 MG/ML
4 INJECTION INTRAMUSCULAR; INTRAVENOUS ONCE
Status: COMPLETED | OUTPATIENT
Start: 2023-04-18 | End: 2023-04-18

## 2023-04-18 RX ORDER — LEVOTHYROXINE SODIUM 175 UG/1
1 TABLET ORAL DAILY
COMMUNITY
Start: 2023-03-13

## 2023-04-18 RX ADMIN — METOPROLOL SUCCINATE 100 MG: 100 TABLET, EXTENDED RELEASE ORAL at 20:28

## 2023-04-18 RX ADMIN — PANTOPRAZOLE SODIUM 40 MG: 40 INJECTION, POWDER, FOR SOLUTION INTRAVENOUS at 20:28

## 2023-04-18 RX ADMIN — SODIUM CHLORIDE 150 ML/HR: 0.9 INJECTION, SOLUTION INTRAVENOUS at 16:44

## 2023-04-18 RX ADMIN — INSULIN LISPRO 2 UNITS: 100 INJECTION, SOLUTION INTRAVENOUS; SUBCUTANEOUS at 20:15

## 2023-04-18 RX ADMIN — ONDANSETRON 4 MG: 2 INJECTION INTRAMUSCULAR; INTRAVENOUS at 13:15

## 2023-04-18 RX ADMIN — SODIUM CHLORIDE 1000 ML: 0.9 INJECTION, SOLUTION INTRAVENOUS at 13:14

## 2023-04-18 RX ADMIN — SODIUM CHLORIDE, SODIUM GLUCONATE, SODIUM ACETATE, POTASSIUM CHLORIDE, MAGNESIUM CHLORIDE, SODIUM PHOSPHATE, DIBASIC, AND POTASSIUM PHOSPHATE 75 ML/HR: .53; .5; .37; .037; .03; .012; .00082 INJECTION, SOLUTION INTRAVENOUS at 20:18

## 2023-04-18 RX ADMIN — METOCLOPRAMIDE 10 MG: 5 INJECTION, SOLUTION INTRAMUSCULAR; INTRAVENOUS at 15:50

## 2023-04-18 NOTE — ASSESSMENT & PLAN NOTE
· Patient presents to the ER with 3-week history of nausea vomiting and weakness  Notes this started after increasing her metoprolol? She states that she did have h  Pylori in the past  No history of liver disease     · Unclear etiology  · Electrolytes on admission within normal limits-continue to monitor  · Trial of antiemetics  · IV protonix 40 mg bid  · IV fluids  · Keep NPO at this time  · GI consult

## 2023-04-18 NOTE — H&P
[address]  H&P  Name: Rich Odom 67 y o  female I MRN: [id number]  Unit/Bed#: ED 01 I Date of Admission: 4/18/2023   Date of Service: 4/18/2023 I Hospital Day: 0      Assessment/Plan   * Nausea and vomiting  Assessment & Plan  · Patient presents to the ER with 3-week history of nausea vomiting and weakness  Notes this started after increasing her metoprolol? She states that she did have h  Pylori in the past  No history of liver disease  · Unclear etiology  · Electrolytes on admission within normal limits-continue to monitor  · Trial of antiemetics  · IV protonix 40 mg bid  · IV fluids  · Keep NPO at this time  · GI consult    Elevated LFTs  Assessment & Plan  · Presented to the ER with elevated LFTs, also elevated at recent ER visit 4/10  · Had negative hepatitis C testing in 2019  · 4/10 today was taking a lot of Tylenol, notes during this admission that she had stopped taking it because her liver enzymes were elevated  Lab Results   Component Value Date     (H) 04/18/2023     (H) 04/18/2023     (H) 04/18/2023     (H) 04/18/2023   · CT abdomen pelvis done 4/10 -slight liver surface nodularity and volume redistribution which may indicate cirrhosis, small abdominal and pelvic ascites  · Had right upper quadrant abdominal ultrasound done in the ER today -hepatomegaly with lobulated hepatic contour which could indicate fibrosis/cirrhosis, possible hemangioma or focal fat on the margin of the right hepatic lobe  · Has been taking large quantities of tylenol, will order acetaminophen level  · Will order hepatitis labs   · Consult GI given continuation of transaminitis      Anxiety and depression  Assessment & Plan  · Currently taking effexor 150 mg outpatient  · Restart when tolerating oral diet    Atrial fibrillation ([facility] [address] )  Assessment & Plan  · Currently taking metoprolol  mg bid outpatient     · Start on IV lopressor 5 mg q6h until tolerating oral diet  · ECG ordered  · Monitor    Type 2 diabetes mellitus, without long-term current use of insulin (HCC)  Assessment & Plan  Lab Results   Component Value Date    HGBA1C 6 8 (H) 06/02/2022       No results for input(s): POCGLU in the last 72 hours  Blood Sugar Average: Last 72 hrs:  · Currently as an outpatient seems to be on metformin, not checking her blood pressures at home per last PCP note  · Would hold metformin given nausea vomiting  · Start on correctional scale with meals, watch for hypoglycemia given poor oral intake    Combined systolic and diastolic congestive heart failure ([facility] [address] )  Assessment & Plan  Wt Readings from Last 3 Encounters:   04/18/23 122 kg (268 lb 15 4 oz)   04/10/23 122 kg (267 lb 14 4 oz)   06/07/22 131 kg (289 lb)     · Is currently on spironolactone 25 mg daily and metoprolol  mg bid  · Echo 6/7/2022 with EF 40%, left ventricular moderately dilated with systolic function normal, inferior basilar wall motion abnormality, normal diastolic function  · Euvolemic at this time, monitor fluid status with addition of fluids  Gentle IVF rehydration  · Start diuretics when able  · Monitor    Disease of thyroid gland  Assessment & Plan  · Maintained on levothyroxine 175 mcg tablet  · Had recent TSH/T4 checked as an outpatient-TSH was low but T4 normal  · Hold and start when able        VTE Pharmacologic Prophylaxis: VTE Score: 5 High Risk (Score >/= 5) - Pharmacological DVT Prophylaxis Ordered: Holding on DVT prophylaxis until patient's INR is obtained  Sequential Compression Devices Ordered  Code Status: Level 3 - DNAR and DNI   Discussion with family: Updated  (daughter) at bedside  Anticipated Length of Stay: Patient will be admitted on an inpatient basis with an anticipated length of stay of greater than 2 midnights secondary to nausea, vomiting, elevated LFTs      Total Time Spent on Date of Encounter in care of patient: 75 minutes This time was spent on one or more of the following: performing physical exam; counseling and coordination of care; obtaining or reviewing history; documenting in the medical record; reviewing/ordering tests, medications or procedures; communicating with other healthcare professionals and discussing with patient's family/caregivers  Chief Complaint: nausea and vomiting    History of Present Illness:  Nora Wilson is a pleasant 67 y o  female with a PMH of hypothyroidism, type 2 diabetes mellitus, anxiety and depression, combined systolic and diastolic CHF, atrial fibrillation who presents with nausea and vomiting for the last month  Patient states that she has been having worsening of abdominal pain, nausea and vomiting for the last month and has been unable to keep anything down  Only significant changes that patient is able to think of was increasing of metoprolol 1 month ago  Patient was recently in the emergency room on 4/10 due to nausea and vomiting along with epigastric pain  She was discharged home on Zofran which she states has not been helping  She states that the last time that she ate anything was on Sunday and it was mashed potatoes which she also vomited  She has not been drinking well either due to continued nausea and vomiting  States that she does have epigastric pain  Does have history of having H  pylori she states  States she does not feel constipation however she states she is unsure the last time that she had a bowel movement  She denies lower abdominal pain, diarrhea, fever, chest pain, shortness of breath, headaches, urinary symptoms  Does have history of gallbladder removal years ago  She is no history of hepatitis or cirrhosis  Does state that she has been having hot flashes and chills intermittently but no fevers  She feels very fatigued due to multiple vomiting episodes  Has been taking lots of tylenol due to the epigastric pain       Review of Systems:  Review of Systems   Constitutional: Positive for activity change, appetite change, chills, diaphoresis and fatigue  Negative for fever  HENT: Negative for congestion, postnasal drip, rhinorrhea, sinus pressure, sneezing and sore throat  Eyes: Negative  Respiratory: Negative for cough, chest tightness, shortness of breath and wheezing  Cardiovascular: Negative for chest pain, palpitations and leg swelling  Gastrointestinal: Positive for abdominal distention, abdominal pain (Epigastric), constipation (States that she has not had a bowel movement in a few days), nausea and vomiting  Negative for diarrhea  Endocrine: Negative  Genitourinary: Negative for difficulty urinating, dysuria, frequency, hematuria and urgency  Musculoskeletal: Negative  Skin: Negative  Allergic/Immunologic: Negative  Neurological: Positive for weakness  Negative for dizziness, syncope, light-headedness and headaches  Hematological: Negative  Psychiatric/Behavioral: Negative  Past Medical and Surgical History:   Past Medical History:   Diagnosis Date   • Arthritis    • CHF (congestive heart failure) ([facility] [address] )    • Disease of thyroid gland        Past Surgical History:   Procedure Laterality Date   • HYSTERECTOMY         Meds/Allergies:  Prior to Admission medications    Medication Sig Start Date End Date Taking?  Authorizing Provider   furosemide (LASIX) 20 mg tablet Take 20 mg by mouth as needed   Yes Historical Provider, MD   levothyroxine 175 mcg tablet Take 1 tablet by mouth daily 3/13/23  Yes Historical Provider, MD   losartan (COZAAR) 100 MG tablet Take 1 tablet by mouth daily 2/15/23  Yes Historical Provider, MD   metFORMIN (GLUCOPHAGE-XR) 500 mg 24 hr tablet Take 500 mg by mouth daily with breakfast 11/10/22  Yes Historical Provider, MD   metoprolol succinate (TOPROL-XL) 100 mg 24 hr tablet Take 100 mg by mouth 2 (two) times a day   Yes Historical Provider, MD   ondansetron (Zofran ODT) 4 mg disintegrating tablet Take 1 tablet (4 mg "total) by mouth every 6 (six) hours as needed for nausea or vomiting 4/10/23  Yes Edgar Ye, DO   spironolactone (ALDACTONE) 25 mg tablet Take 25 mg by mouth daily   Yes Historical Provider, MD   sucralfate (CARAFATE) 1 g/10 mL suspension Take 10 mL (1 g total) by mouth 4 (four) times a day 4/10/23 5/10/23 Yes Edgar Ye, DO   venlafaxine (EFFEXOR-XR) 150 mg 24 hr capsule Take 1 capsule by mouth daily 4/3/23  Yes Historical Provider, MD   warfarin (Jantoven) 5 mg tablet Take 5 mg by mouth daily 2 5mg tues/thurs, 5mg rest of days   Yes Historical Provider, MD   ondansetron (ZOFRAN-ODT) 4 mg disintegrating tablet Take 1 tablet (4 mg total) by mouth every 6 (six) hours as needed for nausea or vomiting 9/11/20 4/18/23  Suki Perkins DO     I have reviewed home medications with patient personally  Allergies: Allergies   Allergen Reactions   • Bupropion      Other reaction(s): Hyper  Other reaction(s): Hyper     • Prednisone    • Tyloxapol      Other reaction(s): halucination   • Penicillins Hives and Rash     Other reaction(s): hives       Social History:  Marital Status:    Occupation:   Patient Pre-hospital Living Situation: Home  Patient Pre-hospital Level of Mobility: unable to be assessed at time of evaluation  Patient Pre-hospital Diet Restrictions:   Substance Use History:   Social History     Substance and Sexual Activity   Alcohol Use Never     Social History     Tobacco Use   Smoking Status Never   Smokeless Tobacco Never     Social History     Substance and Sexual Activity   Drug Use Never       Family History:  History reviewed  No pertinent family history      Physical Exam:     Vitals:   Blood Pressure: 150/85 (04/18/23 1630)  Pulse: 87 (04/18/23 1630)  Temperature: (!) 97 1 °F (36 2 °C) (04/18/23 1112)  Temp Source: Temporal (04/18/23 1112)  Respirations: 20 (04/18/23 1400)  Height: 5' 3\" (160 cm) (04/18/23 1112)  Weight - Scale: 122 kg (268 lb 15 4 oz) (04/18/23 1112)  SpO2: 96 % " (04/18/23 1630)    Physical Exam  Vitals reviewed  Constitutional:       General: She is not in acute distress  Appearance: She is ill-appearing  She is not toxic-appearing  HENT:      Head: Normocephalic and atraumatic  Nose: Nose normal       Mouth/Throat:      Mouth: Mucous membranes are dry  Eyes:      Pupils: Pupils are equal, round, and reactive to light  Cardiovascular:      Rate and Rhythm: Normal rate and regular rhythm  Heart sounds: Normal heart sounds  Pulmonary:      Effort: Pulmonary effort is normal  No respiratory distress  Breath sounds: Normal breath sounds  No stridor  No wheezing  Abdominal:      General: Bowel sounds are normal  There is distension  Palpations: Abdomen is soft  There is no mass  Tenderness: There is abdominal tenderness (epigastric)  There is no guarding or rebound  Musculoskeletal:         General: Normal range of motion  Cervical back: Normal range of motion  Right lower leg: No edema  Left lower leg: No edema  Skin:     General: Skin is warm and dry  Neurological:      General: No focal deficit present  Mental Status: She is alert and oriented to person, place, and time     Psychiatric:         Mood and Affect: Mood normal          Behavior: Behavior normal           Additional Data:     Lab Results:  Results from last 7 days   Lab Units 04/18/23  1133   WBC Thousand/uL 8 96   HEMOGLOBIN g/dL 14 3   HEMATOCRIT % 48 5*   PLATELETS Thousands/uL 309   NEUTROS PCT % 73   LYMPHS PCT % 18   MONOS PCT % 7   EOS PCT % 0     Results from last 7 days   Lab Units 04/18/23  1239 04/18/23  1219   SODIUM mmol/L  --  136   POTASSIUM mmol/L 3 9 4 1   CHLORIDE mmol/L  --  98   CO2 mmol/L  --  26   BUN mg/dL  --  16   CREATININE mg/dL  --  1 07   ANION GAP mmol/L  --  12   CALCIUM mg/dL  --  9 2   ALBUMIN g/dL  --  4 0   TOTAL BILIRUBIN mg/dL  --  1 70*   ALK PHOS U/L  --  133*   ALT U/L  --  212*   AST U/L  --  137*   GLUCOSE RANDOM mg/dL  --  177*                       Lines/Drains:  Invasive Devices     Peripheral Intravenous Line  Duration           Peripheral IV 04/18/23 Right Antecubital <1 day                    Imaging: Reviewed radiology reports from this admission including: ultrasound(s)  US right upper quadrant   Final Result by Kristie Cochran MD (04/18 5332)      1  Hepatomegaly  Lobulated hepatic contour may indicate underlying fibrosis/cirrhosis  Correlation can be made with liver function parameters and ultrasound elastography  2   Echogenic area at the margin of the right hepatic lobe at the [place] hepatis measuring 2 2 x 2 2 x 1 3 cm is nonspecific and could be due to a hemangioma or focal fat  Given suggestion of underlying liver disease, this can be further evaluated with    nonemergent contrast-enhanced MR abdomen to exclude a mass  3   Small volume perihepatic ascites  The study was marked in [facility] for immediate notification  Workstation performed: [id number]             EKG and Other Studies Reviewed on Admission:   · EKG: No EKG obtained  ** Please Note: This note has been constructed using a voice recognition system   **

## 2023-04-18 NOTE — ED PROVIDER NOTES
History  Chief Complaint   Patient presents with   • Vomiting     Pt c/o nausea, vomiting, and weakness over past 3 wks  Pt seen last week in this ED, given rx's pt has been taking without improvement, never call for f/u with GI per dtr  Pt mentioned since increase in beta blocker pt started with these symptoms  Denies travel/sob/fevers/cough/d     67 yr old female presents for evaluation of a 3 week history of nausea vomiting and weakness  Patient was seen in the ED recently for the same and no acute findings  She has had no travel or sick contacts  Patient states she is struggling to keep both liquids and solids down  After the last ED visit the patient was encouraged to follow up with GI outpatient however she has not followed up  She states she was taking a lot of tylenol for pain and her last visit to the ED 4/10 patient was found to have elevated liver enzymes and she has stopped taking tylenol  Prior to Admission Medications   Prescriptions Last Dose Informant Patient Reported?  Taking?   furosemide (LASIX) 20 mg tablet   Yes Yes   Sig: Take 20 mg by mouth as needed   levothyroxine 175 mcg tablet   Yes Yes   Sig: Take 1 tablet by mouth daily   losartan (COZAAR) 100 MG tablet   Yes Yes   Sig: Take 1 tablet by mouth daily   metFORMIN (GLUCOPHAGE-XR) 500 mg 24 hr tablet   Yes Yes   Sig: Take 500 mg by mouth daily with breakfast   metoprolol succinate (TOPROL-XL) 100 mg 24 hr tablet   Yes Yes   Sig: Take 100 mg by mouth 2 (two) times a day   ondansetron (Zofran ODT) 4 mg disintegrating tablet   No Yes   Sig: Take 1 tablet (4 mg total) by mouth every 6 (six) hours as needed for nausea or vomiting   spironolactone (ALDACTONE) 25 mg tablet   Yes Yes   Sig: Take 25 mg by mouth daily   sucralfate (CARAFATE) 1 g/10 mL suspension   No Yes   Sig: Take 10 mL (1 g total) by mouth 4 (four) times a day   venlafaxine (EFFEXOR-XR) 150 mg 24 hr capsule   Yes Yes   Sig: Take 1 capsule by mouth daily   warfarin (Jantoven) 5 mg tablet   Yes Yes   Sig: Take 5 mg by mouth daily 2 5mg tues/thurs, 5mg rest of days      Facility-Administered Medications: None       Past Medical History:   Diagnosis Date   • Arthritis    • CHF (congestive heart failure) (HCC)    • Disease of thyroid gland        Past Surgical History:   Procedure Laterality Date   • HYSTERECTOMY         History reviewed  No pertinent family history  I have reviewed and agree with the history as documented  E-Cigarette/Vaping   • E-Cigarette Use Never User      E-Cigarette/Vaping Substances     Social History     Tobacco Use   • Smoking status: Never   • Smokeless tobacco: Never   Vaping Use   • Vaping Use: Never used   Substance Use Topics   • Alcohol use: Never   • Drug use: Never       Review of Systems    Physical Exam  Physical Exam  Vitals and nursing note reviewed  Constitutional:       General: She is in acute distress  Appearance: Normal appearance  She is well-developed  She is not toxic-appearing or diaphoretic  HENT:      Head: Normocephalic and atraumatic  Nose: Nose normal       Mouth/Throat:      Mouth: Mucous membranes are dry  Eyes:      Extraocular Movements: Extraocular movements intact  Conjunctiva/sclera: Conjunctivae normal    Cardiovascular:      Rate and Rhythm: Normal rate and regular rhythm  Pulses: Normal pulses  Heart sounds: Normal heart sounds  No murmur heard  Pulmonary:      Effort: Pulmonary effort is normal  No respiratory distress  Breath sounds: Normal breath sounds  No stridor  No rhonchi or rales  Abdominal:      General: Abdomen is flat  There is no distension  Palpations: Abdomen is soft  Tenderness: There is abdominal tenderness  There is no right CVA tenderness, left CVA tenderness, guarding or rebound  Hernia: No hernia is present        Comments: +epigastric tenderness to palation   Musculoskeletal:         General: No swelling, tenderness, deformity or signs of injury  Normal range of motion  Cervical back: Normal range of motion and neck supple  No rigidity or tenderness  Right lower leg: No edema  Left lower leg: No edema  Skin:     General: Skin is warm and dry  Capillary Refill: Capillary refill takes less than 2 seconds  Neurological:      General: No focal deficit present  Mental Status: She is alert and oriented to person, place, and time  Mental status is at baseline  Cranial Nerves: No cranial nerve deficit  Sensory: No sensory deficit  Motor: No weakness        Coordination: Coordination normal       Gait: Gait normal       Deep Tendon Reflexes: Reflexes normal    Psychiatric:         Mood and Affect: Mood normal          Vital Signs  ED Triage Vitals [04/18/23 1112]   Temperature Pulse Respirations Blood Pressure SpO2   (!) 97 1 °F (36 2 °C) 85 18 138/75 97 %      Temp Source Heart Rate Source Patient Position - Orthostatic VS BP Location FiO2 (%)   Temporal Monitor Lying Left arm --      Pain Score       --           Vitals:    04/18/23 1400 04/18/23 1500 04/18/23 1530 04/18/23 1545   BP: 147/78 146/74 149/74 145/67   Pulse: 80 80 77 82   Patient Position - Orthostatic VS: Lying            Visual Acuity      ED Medications  Medications   sodium chloride 0 9 % infusion (has no administration in time range)   sodium chloride 0 9 % bolus 1,000 mL (1,000 mL Intravenous New Bag 4/18/23 1314)   ondansetron (ZOFRAN) injection 4 mg (4 mg Intravenous Given 4/18/23 1315)   metoclopramide (REGLAN) injection 10 mg (10 mg Intravenous Given 4/18/23 1550)       Diagnostic Studies  Results Reviewed     Procedure Component Value Units Date/Time    Potassium [[id number]]  (Normal) Collected: 04/18/23 1239    Lab Status: Final result Specimen: Blood from Arm, Right Updated: 04/18/23 1256     Potassium 3 9 mmol/L     CMP [[id number]]  (Abnormal) Collected: 04/18/23 1219    Lab Status: Final result Specimen: Blood from Arm, Left Updated: 04/18/23 1240     Sodium 136 mmol/L      Potassium 4 1 mmol/L      Chloride 98 mmol/L      CO2 26 mmol/L      ANION GAP 12 mmol/L      BUN 16 mg/dL      Creatinine 1 07 mg/dL      Glucose 177 mg/dL      Calcium 9 2 mg/dL       U/L       U/L      Alkaline Phosphatase 133 U/L      Total Protein 6 8 g/dL      Albumin 4 0 g/dL      Total Bilirubin 1 70 mg/dL      eGFR 51 ml/min/1 73sq m     Narrative:      [place] guidelines for Chronic Kidney Disease (CKD):   •  Stage 1 with normal or high GFR (GFR > 90 mL/min/1 73 square meters)  •  Stage 2 Mild CKD (GFR = 60-89 mL/min/1 73 square meters)  •  Stage 3A Moderate CKD (GFR = 45-59 mL/min/1 73 square meters)  •  Stage 3B Moderate CKD (GFR = 30-44 mL/min/1 73 square meters)  •  Stage 4 Severe CKD (GFR = 15-29 mL/min/1 73 square meters)  •  Stage 5 End Stage CKD (GFR <15 mL/min/1 73 square meters)  Note: GFR calculation is accurate only with a steady state creatinine    CMP [[id number]]  (Abnormal) Collected: 04/18/23 1133    Lab Status: Final result Specimen: Blood from Arm, Right Updated: 04/18/23 1208     Sodium 132 mmol/L      Potassium 6 0 mmol/L      Chloride 99 mmol/L      CO2 20 mmol/L      ANION GAP 13 mmol/L      BUN 16 mg/dL      Creatinine 1 05 mg/dL      Glucose 178 mg/dL      Calcium 9 0 mg/dL       U/L       U/L      Alkaline Phosphatase 133 U/L      Total Protein 6 9 g/dL      Albumin 4 0 g/dL      Total Bilirubin 1 73 mg/dL      eGFR 53 ml/min/1 73sq m     Narrative:      [place] guidelines for Chronic Kidney Disease (CKD):   •  Stage 1 with normal or high GFR (GFR > 90 mL/min/1 73 square meters)  •  Stage 2 Mild CKD (GFR = 60-89 mL/min/1 73 square meters)  •  Stage 3A Moderate CKD (GFR = 45-59 mL/min/1 73 square meters)  •  Stage 3B Moderate CKD (GFR = 30-44 mL/min/1 73 square meters)  •  Stage 4 Severe CKD (GFR = 15-29 mL/min/1 73 square meters)  •  Stage 5 End Stage CKD (GFR <15 mL/min/1 73 square meters)  Note: GFR calculation is accurate only with a steady state creatinine    Lipase [[id number]]  (Abnormal) Collected: 04/18/23 1133    Lab Status: Final result Specimen: Blood from Arm, Right Updated: 04/18/23 1208     Lipase 8 u/L     CBC and differential [[id number]]  (Abnormal) Collected: 04/18/23 1133    Lab Status: Final result Specimen: Blood from Arm, Right Updated: 04/18/23 1138     WBC 8 96 Thousand/uL      RBC 5 60 Million/uL      Hemoglobin 14 3 g/dL      Hematocrit 48 5 %      MCV 87 fL      MCH 25 5 pg      MCHC 29 5 g/dL      RDW 19 4 %      MPV 10 2 fL      Platelets 343 Thousands/uL      nRBC 0 /100 WBCs      Neutrophils Relative 73 %      Immat GRANS % 1 %      Lymphocytes Relative 18 %      Monocytes Relative 7 %      Eosinophils Relative 0 %      Basophils Relative 1 %      Neutrophils Absolute 6 55 Thousands/µL      Immature Grans Absolute 0 05 Thousand/uL      Lymphocytes Absolute 1 62 Thousands/µL      Monocytes Absolute 0 65 Thousand/µL      Eosinophils Absolute 0 03 Thousand/µL      Basophils Absolute 0 06 Thousands/µL                  US right upper quadrant   Final Result by Kinjal Funes MD (04/18 1432)      1  Hepatomegaly  Lobulated hepatic contour may indicate underlying fibrosis/cirrhosis  Correlation can be made with liver function parameters and ultrasound elastography  2   Echogenic area at the margin of the right hepatic lobe at the [place] hepatis measuring 2 2 x 2 2 x 1 3 cm is nonspecific and could be due to a hemangioma or focal fat  Given suggestion of underlying liver disease, this can be further evaluated with    nonemergent contrast-enhanced MR abdomen to exclude a mass  3   Small volume perihepatic ascites  The study was marked in [facility] for immediate notification         Workstation performed: [id number]                    Procedures  Procedures         ED Course  ED Course as of 04/18/23 1608   Tue Apr 18, 2023   1541 Patient unable to tolerate PO  Will hydrate continue antiemetic   1606 Case was discussed with the hospitalist and patient will be admitted for intractable abdominal pain                               SBIRT 20yo+    Flowsheet Row Most Recent Value   Initial Alcohol Screen: US AUDIT-C     1  How often do you have a drink containing alcohol? 0 Filed at: 04/18/2023 1130   2  How many drinks containing alcohol do you have on a typical day you are drinking? 0 Filed at: 04/18/2023 1130   3a  Male UNDER 65: How often do you have five or more drinks on one occasion? 0 Filed at: 04/18/2023 1130   3b  FEMALE Any Age, or MALE 65+: How often do you have 4 or more drinks on one occassion? 0 Filed at: 04/18/2023 1130   Audit-C Score 0 Filed at: 04/18/2023 1130   CRESENCIO: How many times in the past year have you    Used an illegal drug or used a prescription medication for non-medical reasons? Never Filed at: 04/18/2023 1130                    Medical Decision Making  DDX: Gastitis, GERD, Pancreatitis, elevated LFT    Amount and/or Complexity of Data Reviewed  Labs: ordered  Radiology: ordered  Risk  Prescription drug management  Disposition  Final diagnoses:   Nausea & vomiting   Elevated LFTs     Time reflects when diagnosis was documented in both MDM as applicable and the Disposition within this note     Time User Action Codes Description Comment    4/18/2023  4:07 PM Benigno Crowell Add [R11 2] Nausea & vomiting     4/18/2023  4:07 PM Alfonzo Garza 450 [R79 89] Elevated LFTs       ED Disposition     ED Disposition   Admit    Condition   Stable    Date/Time   Tue Apr 18, 2023  4:08 PM    Comment   --         Follow-up Information    None         Patient's Medications   Discharge Prescriptions    No medications on file       No discharge procedures on file      PDMP Review     None          ED Provider  Electronically Signed by           Robyn Foley DO  04/18/23 2247

## 2023-04-18 NOTE — ASSESSMENT & PLAN NOTE
· Currently taking metoprolol  mg bid outpatient     · Start on IV lopressor 5 mg q6h until tolerating oral diet  · ECG ordered  · Monitor

## 2023-04-18 NOTE — Clinical Note
Kelsey Marlow accompanied [facility] to the emergency department on 4/18/2023  Return date if applicable: If you have any questions or concerns, please don't hesitate to call        William Diallo, DO

## 2023-04-18 NOTE — ASSESSMENT & PLAN NOTE
Lab Results   Component Value Date    HGBA1C 6 8 (H) 06/02/2022       No results for input(s): POCGLU in the last 72 hours      Blood Sugar Average: Last 72 hrs:  · Currently as an outpatient seems to be on metformin, not checking her blood pressures at home per last PCP note  · Would hold metformin given nausea vomiting  · Start on correctional scale with meals, watch for hypoglycemia given poor oral intake

## 2023-04-18 NOTE — ASSESSMENT & PLAN NOTE
Wt Readings from Last 3 Encounters:   04/18/23 122 kg (268 lb 15 4 oz)   04/10/23 122 kg (267 lb 14 4 oz)   06/07/22 131 kg (289 lb)     · Is currently on spironolactone 25 mg daily and metoprolol  mg bid  · Echo 6/7/2022 with EF 40%, left ventricular moderately dilated with systolic function normal, inferior basilar wall motion abnormality, normal diastolic function  · Euvolemic at this time, monitor fluid status with addition of fluids  Gentle IVF rehydration     · Start diuretics when able  · Monitor

## 2023-04-18 NOTE — ASSESSMENT & PLAN NOTE
· Maintained on levothyroxine 175 mcg tablet  · Had recent TSH/T4 checked as an outpatient-TSH was low but T4 normal  · Hold and start when able

## 2023-04-18 NOTE — ASSESSMENT & PLAN NOTE
· Presented to the ER with elevated LFTs, also elevated at recent ER visit 4/10  · Had negative hepatitis C testing in 2019  · 4/10 today was taking a lot of Tylenol, notes during this admission that she had stopped taking it because her liver enzymes were elevated  Lab Results   Component Value Date     (H) 04/18/2023     (H) 04/18/2023     (H) 04/18/2023     (H) 04/18/2023   · CT abdomen pelvis done 4/10 -slight liver surface nodularity and volume redistribution which may indicate cirrhosis, small abdominal and pelvic ascites  · Had right upper quadrant abdominal ultrasound done in the ER today -hepatomegaly with lobulated hepatic contour which could indicate fibrosis/cirrhosis, possible hemangioma or focal fat on the margin of the right hepatic lobe  · Has been taking large quantities of tylenol, will order acetaminophen level     · Will order hepatitis labs   · Consult GI given continuation of transaminitis

## 2023-04-18 NOTE — ED NOTES
PO trial unsuccessful  Pt began vomiting  New orders by provider        Arianna Kern RN  04/18/23 4094

## 2023-04-19 ENCOUNTER — APPOINTMENT (INPATIENT)
Dept: ULTRASOUND IMAGING | Facility: HOSPITAL | Age: 73
End: 2023-04-19

## 2023-04-19 ENCOUNTER — APPOINTMENT (INPATIENT)
Dept: RADIOLOGY | Facility: HOSPITAL | Age: 73
End: 2023-04-19

## 2023-04-19 PROBLEM — R79.1 SUPRATHERAPEUTIC INR: Status: ACTIVE | Noted: 2023-04-19

## 2023-04-19 LAB
AFP-TM SERPL-MCNC: 3.4 NG/ML (ref 0.5–8)
ALBUMIN SERPL BCP-MCNC: 3.5 G/DL (ref 3.5–5)
ALP SERPL-CCNC: 108 U/L (ref 34–104)
ALT SERPL W P-5'-P-CCNC: 164 U/L (ref 7–52)
ANION GAP SERPL CALCULATED.3IONS-SCNC: 10 MMOL/L (ref 4–13)
AST SERPL W P-5'-P-CCNC: 86 U/L (ref 13–39)
ATRIAL RATE: 82 BPM
BILIRUB SERPL-MCNC: 1.36 MG/DL (ref 0.2–1)
BNP SERPL-MCNC: 2553 PG/ML (ref 0–100)
BUN SERPL-MCNC: 15 MG/DL (ref 5–25)
CALCIUM SERPL-MCNC: 8.5 MG/DL (ref 8.4–10.2)
CHLORIDE SERPL-SCNC: 103 MMOL/L (ref 96–108)
CO2 SERPL-SCNC: 25 MMOL/L (ref 21–32)
CREAT SERPL-MCNC: 0.92 MG/DL (ref 0.6–1.3)
ERYTHROCYTE [DISTWIDTH] IN BLOOD BY AUTOMATED COUNT: 18.1 % (ref 11.6–15.1)
GFR SERPL CREATININE-BSD FRML MDRD: 62 ML/MIN/1.73SQ M
GLUCOSE SERPL-MCNC: 103 MG/DL (ref 65–140)
GLUCOSE SERPL-MCNC: 106 MG/DL (ref 65–140)
GLUCOSE SERPL-MCNC: 128 MG/DL (ref 65–140)
GLUCOSE SERPL-MCNC: 132 MG/DL (ref 65–140)
GLUCOSE SERPL-MCNC: 152 MG/DL (ref 65–140)
HAV IGM SER QL: NORMAL
HBV CORE IGM SER QL: NORMAL
HBV SURFACE AG SER QL: NORMAL
HCT VFR BLD AUTO: 38.9 % (ref 34.8–46.1)
HCV AB SER QL: NORMAL
HGB BLD-MCNC: 11.7 G/DL (ref 11.5–15.4)
INR PPP: 5.64 (ref 0.84–1.19)
MAGNESIUM SERPL-MCNC: 1.8 MG/DL (ref 1.9–2.7)
MCH RBC QN AUTO: 25.3 PG (ref 26.8–34.3)
MCHC RBC AUTO-ENTMCNC: 30.1 G/DL (ref 31.4–37.4)
MCV RBC AUTO: 84 FL (ref 82–98)
P AXIS: 68 DEGREES
PLATELET # BLD AUTO: 268 THOUSANDS/UL (ref 149–390)
PMV BLD AUTO: 9.7 FL (ref 8.9–12.7)
POTASSIUM SERPL-SCNC: 3.9 MMOL/L (ref 3.5–5.3)
PR INTERVAL: 166 MS
PROCALCITONIN SERPL-MCNC: 0.14 NG/ML
PROT SERPL-MCNC: 5.9 G/DL (ref 6.4–8.4)
PROTHROMBIN TIME: 50.9 SECONDS (ref 11.6–14.5)
QRS AXIS: -15 DEGREES
QRSD INTERVAL: 104 MS
QT INTERVAL: 404 MS
QTC INTERVAL: 472 MS
RBC # BLD AUTO: 4.63 MILLION/UL (ref 3.81–5.12)
SODIUM SERPL-SCNC: 138 MMOL/L (ref 135–147)
T WAVE AXIS: 128 DEGREES
VENTRICULAR RATE: 82 BPM
WBC # BLD AUTO: 7.23 THOUSAND/UL (ref 4.31–10.16)

## 2023-04-19 RX ORDER — POLYETHYLENE GLYCOL 3350 17 G/17G
17 POWDER, FOR SOLUTION ORAL DAILY
Status: DISCONTINUED | OUTPATIENT
Start: 2023-04-20 | End: 2023-04-25 | Stop reason: HOSPADM

## 2023-04-19 RX ORDER — SPIRONOLACTONE 25 MG/1
25 TABLET ORAL DAILY
Status: DISCONTINUED | OUTPATIENT
Start: 2023-04-19 | End: 2023-04-25 | Stop reason: HOSPADM

## 2023-04-19 RX ORDER — DOCUSATE SODIUM 100 MG/1
100 CAPSULE, LIQUID FILLED ORAL 2 TIMES DAILY
Status: DISCONTINUED | OUTPATIENT
Start: 2023-04-19 | End: 2023-04-25 | Stop reason: HOSPADM

## 2023-04-19 RX ORDER — VENLAFAXINE HYDROCHLORIDE 150 MG/1
150 CAPSULE, EXTENDED RELEASE ORAL DAILY
Status: DISCONTINUED | OUTPATIENT
Start: 2023-04-19 | End: 2023-04-25 | Stop reason: HOSPADM

## 2023-04-19 RX ORDER — NYSTATIN 100000 [USP'U]/G
POWDER TOPICAL 2 TIMES DAILY
Status: DISCONTINUED | OUTPATIENT
Start: 2023-04-19 | End: 2023-04-25 | Stop reason: HOSPADM

## 2023-04-19 RX ORDER — MAGNESIUM SULFATE HEPTAHYDRATE 40 MG/ML
4 INJECTION, SOLUTION INTRAVENOUS ONCE
Status: COMPLETED | OUTPATIENT
Start: 2023-04-19 | End: 2023-04-19

## 2023-04-19 RX ADMIN — PANTOPRAZOLE SODIUM 40 MG: 40 INJECTION, POWDER, FOR SOLUTION INTRAVENOUS at 21:07

## 2023-04-19 RX ADMIN — LEVOTHYROXINE SODIUM 175 MCG: 175 TABLET ORAL at 09:43

## 2023-04-19 RX ADMIN — NYSTATIN: 100000 POWDER TOPICAL at 21:04

## 2023-04-19 RX ADMIN — INSULIN LISPRO 2 UNITS: 100 INJECTION, SOLUTION INTRAVENOUS; SUBCUTANEOUS at 21:07

## 2023-04-19 RX ADMIN — SPIRONOLACTONE 25 MG: 25 TABLET ORAL at 17:04

## 2023-04-19 RX ADMIN — DOCUSATE SODIUM 100 MG: 100 CAPSULE ORAL at 17:04

## 2023-04-19 RX ADMIN — PANTOPRAZOLE SODIUM 40 MG: 40 INJECTION, POWDER, FOR SOLUTION INTRAVENOUS at 09:44

## 2023-04-19 RX ADMIN — METOPROLOL SUCCINATE 100 MG: 100 TABLET, EXTENDED RELEASE ORAL at 21:07

## 2023-04-19 RX ADMIN — SODIUM CHLORIDE, SODIUM GLUCONATE, SODIUM ACETATE, POTASSIUM CHLORIDE, MAGNESIUM CHLORIDE, SODIUM PHOSPHATE, DIBASIC, AND POTASSIUM PHOSPHATE 75 ML/HR: .53; .5; .37; .037; .03; .012; .00082 INJECTION, SOLUTION INTRAVENOUS at 13:53

## 2023-04-19 RX ADMIN — METOPROLOL SUCCINATE 100 MG: 100 TABLET, EXTENDED RELEASE ORAL at 09:43

## 2023-04-19 RX ADMIN — VENLAFAXINE HYDROCHLORIDE 150 MG: 150 CAPSULE, EXTENDED RELEASE ORAL at 15:05

## 2023-04-19 RX ADMIN — MAGNESIUM SULFATE HEPTAHYDRATE 4 G: 40 INJECTION, SOLUTION INTRAVENOUS at 09:42

## 2023-04-19 NOTE — PHYSICAL THERAPY NOTE
PHYSICAL THERAPY EVALUATION  NAME:  Carly Garcia  DATE: 04/19/23    AGE:   67 y o  Mrn:   [id number]  ADMIT DX:  Vomiting [R11 10]  Nausea & vomiting [R11 2]  Elevated LFTs [R79 89]    Past Medical History:   Diagnosis Date    Arthritis     CHF (congestive heart failure) ([facility] [address] )     Disease of thyroid gland      Length Of Stay: 1  Performed at least 2 patient identifiers during session: Name and Birthday  PHYSICAL THERAPY EVALUATION :        04/19/23 1127   Note Type   Note type Evaluation   Pain Assessment   Pain Assessment Tool 0-10   Pain Score No Pain   Restrictions/Precautions   Other Precautions Chair Alarm; Bed Alarm; Fall Risk;Multiple lines   Home Living   Type of Home House  (ramped entry)   Home Layout Two level;Bed/bath upstairs;1/2 bath on main level;Ramped entrance   Bathroom Shower/Tub Walk-in shower   Bathroom Toilet   (1 of each)   Bathroom Equipment Grab bars in shower; Shower chair   Home Equipment Walker;Stair glide; Hospital bed   Additional Comments Pt reports living in 2 SH with dtr and grandkids with ramped entry and stairglide to second floor, uses RW at baseline   Prior Function   Level of [place] Independent with ADLs; Independent with functional mobility; Needs assistance with IADLS   Lives With Daughter  (and grandchildren)   Receives Help From Family   IADLs Family/Friend/Other provides transportation   Falls in the last 6 months 1 to 4   Comments Pt reports completing ADLs, mobility, and light IADLs at mod I, has assistance with heavy IADLs and transportation   Cognition   Overall Cognitive Status WFL   Orientation Level Oriented X4   Following Commands Follows one step commands without difficulty   RLE Assessment   RLE Assessment WFL  (4/5 strength, except hip/knee 3/5)   LLE Assessment   LLE Assessment WFL  (4/5 strength)   Light Touch   RLE Light Touch Grossly intact   LLE Light Touch Grossly intact   Bed Mobility   Supine to Sit 6  Modified independent   Additional items HOB elevated; Bedrails   Additional Comments pt reports sleeing in hospital bed   Transfers   Sit to Stand   (SBA)   Additional items Increased time required;Verbal cues   Stand to Sit   (SBA)   Additional items Increased time required;Verbal cues   Stand pivot   (SBA)   Additional items Increased time required;Verbal cues   Additional Comments Transfers with RW, SBA for safety and VC for hand placement  Pt completing STS from lower surface with grab bars with SBA with VC for grab bar use   Ambulation/Elevation   Gait pattern Improper Weight shift; Wide MALKA; Decreased foot clearance; Excessively slow   Gait Assistance   (SBA)   Additional items Verbal cues   Assistive Device Rolling walker   Distance 20' x 2 with SBA for safety, incidental VC for RW management, no LOB, audible mild COX, educated pt on pursed lip breathing, O2 WNL throughout session   Balance   Static Sitting Normal   Dynamic Sitting Good   Static Standing Fair +   Dynamic Standing Fair   Ambulatory Fair -   Endurance Deficit   Endurance Deficit Yes   Endurance Deficit Description fatigue   Activity Tolerance   Activity Tolerance Patient limited by fatigue   Medical Staff Made Aware O[place]   Nurse Made Aware RN [place]   Assessment   Prognosis Good   Problem List Decreased strength;Decreased endurance; Impaired balance;Decreased mobility; Decreased safety awareness; Obesity   Barriers to Discharge None   Barriers to Discharge Comments Pt has family support PRN   Goals   Patient Goals Go home   STG Expiration Date 05/03/23   Plan   Treatment/Interventions Functional transfer training;LE strengthening/ROM; Therapeutic exercise; Endurance training;Patient/family training;Bed mobility; Equipment eval/education;Gait training; Compensatory technique education;Spoke to nursing;OT   PT Frequency 3-5x/wk   Recommendation   PT Discharge Recommendation Home with home health rehabilitation   Equipment Recommended   (pt owns RW)   AM-PAC Basic Mobility Inpatient   Turning in Flat Bed Without Bedrails 4   Lying on Back to Sitting on Edge of Flat Bed Without Bedrails 4   Moving Bed to Chair 3   Standing Up From Chair Using Arms 3   Walk in Room 3   Climb 3-5 Stairs With Railing 3   Basic Mobility Inpatient Raw Score 20   Basic Mobility Standardized Score 43 99   Highest Level Of Mobility   JH-HLM Goal 6: Walk 10 steps or more   JH-HLM Achieved 7: Walk 25 feet or more   End of Consult   Patient Position at End of Consult Bedside chair;Bed/Chair alarm activated; All needs within reach     The patient's AM-PAC Basic Mobility Inpatient Short Form Raw Score is 20  A Raw score of greater than 16 suggests the patient may benefit from discharge to home  Please also refer to the recommendation of the Physical Therapist for safe discharge planning  (Please find full objective findings from PT assessment regarding body systems outlined above)  Assessment: Pt is a 67 y o  female seen for PT evaluation s/p admission to [address] [place] [address] on 4/18/2023 with Nausea and vomiting  Order placed for PT services  Upon evaluation: Pt is presenting with impaired functional mobility due to decreased strength, decreased ROM, decreased endurance, impaired balance, gait deviations, decreased safety awareness, and fall risk requiring  mod I assistance for bed mobility and SBA assistance for transfers and ambulation with RW   Pt's clinical presentation is currently unstable/unpredictable given the functional mobility deficits above coupled with fall risks as indicated by AM-PAC 6-Clicks: 81/28 as well as impaired balance, polypharmacy, and decreased safety awareness and combined with medical complications of need for input for mobility technique/safety  Pt's PMHx and comorbidities that may affect physical performance and progress include: A fib, CHF, DM, and obesity   Personal factors affecting pt at time of IE include: multi-level environment, inability to perform IADLs, inability to navigate level surfaces without external assistance, and inability to navigate community distances  Pt will benefit from continued skilled PT services to address deficits as defined above and to maximize level of functional mobility to facilitate return toward PLOF and improved QOL  From PT/mobility standpoint, recommendation at time of d/c would be Home PT pending progress in order to reduce fall risk and maximize pt's functional independence and consistency with mobility in order to facilitate return to PLOF  Recommend trial with walker next 1-2 sessions and ther ex next 1-2 sessions  Goals: Pt will: Perform transfers with modified I to increase Indep in home environment and prepare for ambulation  Pt will ambulate with RW for >/= 150' with  modified I  to improve gait quality and promote proper use of assistive device and to access home environment  Pt will complete >/= 1 step with with bilateral handrails with Supervision to  prepare for community mobility   Increase bilateral LE strength 1/2 grade to facilitate independent mobility and Increase all balance 1/2 grade to decrease risk for falls          Jaden An, PT,DPT

## 2023-04-19 NOTE — ASSESSMENT & PLAN NOTE
· Presented to the ER with elevated LFTs, also elevated at recent ER visit 4/10  · Had negative hepatitis C testing in 2019  · 4/10 today was taking a lot of Tylenol, notes during this admission that she had stopped taking it because her liver enzymes were elevated    Lab Results   Component Value Date    AST 86 (H) 04/19/2023     (H) 04/18/2023     (H) 04/19/2023     (H) 04/18/2023     · CT abdomen pelvis done 4/10 -slight liver surface nodularity and volume redistribution which may indicate cirrhosis, small abdominal and pelvic ascites  · Had right upper quadrant abdominal ultrasound done in the ER today -hepatomegaly with lobulated hepatic contour which could indicate fibrosis/cirrhosis, possible hemangioma or focal fat on the margin of the right hepatic lobe  · Has been taking large quantities of tylenol, acetaminophen level <10  · Acute hepatitis labs normal     · GI: Acute mixed pattern liver enzyme elevation and is downtrending(similar presentation in 2018), check liver doppler and AFP   Outpatient MRI liver protocol

## 2023-04-19 NOTE — PLAN OF CARE
Problem: Potential for Falls  Goal: Patient will remain free of falls  Description: INTERVENTIONS:  - Educate patient/family on patient safety including physical limitations  - Instruct patient to call for assistance with activity   - Consult OT/PT to assist with strengthening/mobility   - Keep Call bell within reach  - Keep bed low and locked with side rails adjusted as appropriate  - Keep care items and personal belongings within reach  - Initiate and maintain comfort rounds  - Make Fall Risk Sign visible to staff  - Offer Toileting every 2 Hours, in advance of need  - Obtain necessary fall risk management equipment: walker  - Apply yellow socks and bracelet for high fall risk patients  - Consider moving patient to room near nurses station  Outcome: Progressing     Problem: MOBILITY - ADULT  Goal: Maintain or return to baseline ADL function  Description: INTERVENTIONS:  -  Assess patient's ability to carry out ADLs; assess patient's baseline for ADL function and identify physical deficits which impact ability to perform ADLs (bathing, care of mouth/teeth, toileting, grooming, dressing, etc )  - Assess/evaluate cause of self-care deficits   - Assess range of motion  - Assess patient's mobility; develop plan if impaired  - Assess patient's need for assistive devices and provide as appropriate  - Encourage maximum independence but intervene and supervise when necessary  - Involve family in performance of ADLs  - Assess for home care needs following discharge   - Consider OT consult to assist with ADL evaluation and planning for discharge  - Provide patient education as appropriate  Outcome: Progressing  Goal: Maintains/Returns to pre admission functional level  Description: INTERVENTIONS:  - Perform BMAT or MOVE assessment daily    - Set and communicate daily mobility goal to care team and patient/family/caregiver     - Collaborate with rehabilitation services on mobility goals if consulted  - Perform Range of Motion 3 times a day  - Reposition patient every 3 hours    - Dangle patient 3 times a day  - Stand patient 3 times a day  - Ambulate patient 3 times a day  - Out of bed to chair 3 times a day   - Out of bed for meals 3 times a day  - Out of bed for toileting  - Record patient progress and toleration of activity level   Outcome: Progressing     Problem: PAIN - ADULT  Goal: Verbalizes/displays adequate comfort level or baseline comfort level  Description: Interventions:  - Encourage patient to monitor pain and request assistance  - Assess pain using appropriate pain scale  - Administer analgesics based on type and severity of pain and evaluate response  - Implement non-pharmacological measures as appropriate and evaluate response  - Consider cultural and social influences on pain and pain management  - Notify physician/advanced practitioner if interventions unsuccessful or patient reports new pain  Outcome: Progressing     Problem: INFECTION - ADULT  Goal: Absence or prevention of progression during hospitalization  Description: INTERVENTIONS:  - Assess and monitor for signs and symptoms of infection  - Monitor lab/diagnostic results  - Monitor all insertion sites, i e  indwelling lines, tubes, and drains  - Monitor endotracheal if appropriate and nasal secretions for changes in amount and color  - [place] appropriate cooling/warming therapies per order  - Administer medications as ordered  - Instruct and encourage patient and family to use good hand hygiene technique  - Identify and instruct in appropriate isolation precautions for identified infection/condition  Outcome: Progressing  Goal: Absence of fever/infection during neutropenic period  Description: INTERVENTIONS:  - Monitor WBC    Outcome: Progressing     Problem: SAFETY ADULT  Goal: Patient will remain free of falls  Description: INTERVENTIONS:  - Educate patient/family on patient safety including physical limitations  - Instruct patient to call for assistance with activity   - Consult OT/PT to assist with strengthening/mobility   - Keep Call bell within reach  - Keep bed low and locked with side rails adjusted as appropriate  - Keep care items and personal belongings within reach  - Initiate and maintain comfort rounds  - Make Fall Risk Sign visible to staff  - Offer Toileting every 2 Hours, in advance of need  - Obtain necessary fall risk management equipment: walker  - Apply yellow socks and bracelet for high fall risk patients  - Consider moving patient to room near nurses station  Outcome: Progressing  Goal: Maintain or return to baseline ADL function  Description: INTERVENTIONS:  -  Assess patient's ability to carry out ADLs; assess patient's baseline for ADL function and identify physical deficits which impact ability to perform ADLs (bathing, care of mouth/teeth, toileting, grooming, dressing, etc )  - Assess/evaluate cause of self-care deficits   - Assess range of motion  - Assess patient's mobility; develop plan if impaired  - Assess patient's need for assistive devices and provide as appropriate  - Encourage maximum independence but intervene and supervise when necessary  - Involve family in performance of ADLs  - Assess for home care needs following discharge   - Consider OT consult to assist with ADL evaluation and planning for discharge  - Provide patient education as appropriate  Outcome: Progressing  Goal: Maintains/Returns to pre admission functional level  Description: INTERVENTIONS:  - Perform BMAT or MOVE assessment daily    - Set and communicate daily mobility goal to care team and patient/family/caregiver  - Collaborate with rehabilitation services on mobility goals if consulted  - Perform Range of Motion 3 times a day  - Reposition patient every 3 hours    - Dangle patient 3 times a day  - Stand patient 3 times a day  - Ambulate patient 3 times a day  - Out of bed to chair 3 times a day   - Out of bed for meals 3 times a day  - Out of bed for toileting  - Record patient progress and toleration of activity level   Outcome: Progressing     Problem: DISCHARGE PLANNING  Goal: Discharge to home or other facility with appropriate resources  Description: INTERVENTIONS:  - Identify barriers to discharge w/patient and caregiver  - Arrange for needed discharge resources and transportation as appropriate  - Identify discharge learning needs (meds, wound care, etc )  - Arrange for interpretive services to assist at discharge as needed  - Refer to Case Management Department for coordinating discharge planning if the patient needs post-hospital services based on physician/advanced practitioner order or complex needs related to functional status, cognitive ability, or social support system  Outcome: Progressing     Problem: Knowledge Deficit  Goal: Patient/family/caregiver demonstrates understanding of disease process, treatment plan, medications, and discharge instructions  Description: Complete learning assessment and assess knowledge base    Interventions:  - Provide teaching at level of understanding  - Provide teaching via preferred learning methods  Outcome: Progressing

## 2023-04-19 NOTE — CONSULTS
Consultation - [facility] at [facility] [age] y o  female MRN: [id number]  Unit/Bed#: -01 Encounter: [id number]        Inpatient consult to gastroenterology  Consult performed by: Ciara Edwards MD  Consult ordered by: Iván Wick PA-C          Reason for Consult / Principal Problem:     Nausea and vomiting  Abdominal pain  Elevated liver enzymes  Abnormal CT of the liver          ASSESSMENT AND PLAN:      Nausea and vomiting  Abdominal pain  Elevated liver enzymes  Normal CT of the liver  -Acute mixed pattern liver enzyme elevation and is downtrending(similar presentation in 2018)  -Negative acute hepatitis panel  -Ultrasound with normal echotexture but lobulated contour with 2 cm lesion  -She is status post cholecystectomy  -Differential diagnosis includes viral gastroenteritis, PUD, persistent H  pylori, gastroparesis, less likely [address]  low probability for clinically significant portal hypertension in the setting of normal platelets, however metastatic lesion is also in consideration given sclerotic lesions seen on CT   -Her history of CHF also puts her at increased risk for hepatic vascular congestion-she did mention coughing-consider checking chest x-ray +/-BNP  -Patient with supratherapeutic INR-continue to monitor do not see a need to reverse given there is no emergent need in performing EGD  -Trial of clear liquid diet  -Check liver Doppler  -Check AFP  -Continue PPI  -Outpatient MRI liver protocol  -Antiemetics as needed  -Continue to trend hepatic enzymes  -Continue to trend INR    ______________________________________________________________________    HPI:  Colette Harrison is a/an 67 y o  female seen in consultation for nausea, vomiting abdominal pain with associated elevated liver enzymes    Patient with significant past medical history that includes diabetes, hypothyroidism, cardiomyopathy with combined CHF status post AICD(EF 40%), moderate aortic stenosis ,atrial fibrillation on Coumadin, NADIA and depression/anxiety  Patient with complaints of nausea, dysphagia, epigastric pain and vomiting mostly of phlegm  Denies any fevers, sick contacts, change in bowel habits, blood or melena in stool  Denies excessive NSAID use  Tylenol a few times per week  No significant alcohol use history  No known liver disease family history of liver disease  Denies over-the-counter supplementation herbal supplements  She does state that she recently had her metoprolol dose increased about 1 month ago  She had a similar presentation in 2018  Patient recently presented to ED on 4/10/2023 with similar complaints found to had elevated liver enzymes at that time and was instructed to follow-up with GI as an outpatient  Presents again with persistent symptoms  Last EGD by Dr Edward Gill in 2018 which showed H  pylori gastritis  She states she was treated for this but has not had repeat testing to confirm eradication  Previous to this it was normal for minimally elevated alk phos at 121  --> 151--> 137--> 86  -->207-->212-->164  --> 133-->--> 133-->108  TB 2 1--> 1 73--> 1 7--> 1 36  No evidence of lipase elevation    CT 4/10:  IMPRESSION:     1) Collapsed urinary bladder limiting evaluation with perivesical stranding which may indicate cystitis     2) No bowel obstruction  Normal appendix  Evaluation for bowel inflammation somewhat limited by underdistention and lack of oral contrast, however no convincing inflammatory changes  Please note mild inflammation may not be apparent     3) Slight liver surface nodularity and volume redistribution, which may indicate cirrhosis     4) Small abdominal and pelvic ascites  No organized collections to suggest an abscess      5) Partially imaged, lobulated, sclerotic lesion in the medullary cavity of the right femoral diaphysis of unclear etiology    Recommend further evaluation with radiographs of the right femur to assess full extent of the lesion     6) 3 subcentimeter mildly sclerotic lesions in the right side of the sacrum and right proximal femur, which are also indeterminate  These may represent bone islands, however metastatic disease is not excluded      7) Mild subcutaneous tissue edema in the supraumbilical ventral abdominal wall, which may be related to cellulitis  No organized collections to suggest an abscess     8) 3 3 x 2 4 cm cystic lesion in the left ovary  Further evaluation with nonemergent pelvic ultrasound is recommended      9) Additional findings as above  RUQ US 4/18: IMPRESSION:     1  Hepatomegaly  Lobulated hepatic contour may indicate underlying fibrosis/cirrhosis  Correlation can be made with liver function parameters and ultrasound elastography      2   Echogenic area at the margin of the right hepatic lobe at the [place] hepatis measuring 2 2 x 2 2 x 1 3 cm is nonspecific and could be due to a hemangioma or focal fat  Given suggestion of underlying liver disease, this can be further evaluated with   nonemergent contrast-enhanced MR abdomen to exclude a mass      3   Small volume perihepatic ascites            REVIEW OF SYSTEMS:    Review of Systems   Constitutional: Positive for chills  Negative for fever and unexpected weight change  HENT: Positive for trouble swallowing  Eyes: Negative for pain  Respiratory: Negative for shortness of breath  Cardiovascular: Negative for chest pain  Gastrointestinal: Positive for abdominal pain, nausea and vomiting  Negative for blood in stool, constipation and diarrhea  Genitourinary: Negative for dysuria  Musculoskeletal: Negative for arthralgias  Skin: Negative for rash  Psychiatric/Behavioral: Negative for confusion           Historical Information   Past Medical History:   Diagnosis Date   • Arthritis    • CHF (congestive heart failure) ([facility] [address] )    • Disease of thyroid gland      Past Surgical History:   Procedure Laterality Date   • HYSTERECTOMY       Social History   Social History     Substance and Sexual Activity   Alcohol Use Never     Social History     Substance and Sexual Activity   Drug Use Never     Social History     Tobacco Use   Smoking Status Never   Smokeless Tobacco Never     History reviewed  No pertinent family history      Meds/Allergies     Medications Prior to Admission   Medication   • levothyroxine 175 mcg tablet   • losartan (COZAAR) 100 MG tablet   • metFORMIN (GLUCOPHAGE-XR) 500 mg 24 hr tablet   • metoprolol succinate (TOPROL-XL) 100 mg 24 hr tablet   • ondansetron (Zofran ODT) 4 mg disintegrating tablet   • spironolactone (ALDACTONE) 25 mg tablet   • sucralfate (CARAFATE) 1 g/10 mL suspension   • venlafaxine (EFFEXOR-XR) 150 mg 24 hr capsule   • warfarin (COUMADIN) 5 mg tablet   • furosemide (LASIX) 20 mg tablet     Current Facility-Administered Medications   Medication Dose Route Frequency   • aluminum-magnesium hydroxide-simethicone (MYLANTA) oral suspension 30 mL  30 mL Oral Q6H PRN   • insulin lispro (HumaLOG) 100 units/mL subcutaneous injection 2-12 Units  2-12 Units Subcutaneous TID AC   • insulin lispro (HumaLOG) 100 units/mL subcutaneous injection 2-12 Units  2-12 Units Subcutaneous HS   • levothyroxine tablet 175 mcg  175 mcg Oral Daily   • magnesium sulfate 4 g/100 mL IVPB (premix) 4 g  4 g Intravenous Once   • metoprolol succinate (TOPROL-XL) 24 hr tablet 100 mg  100 mg Oral BID   • multi-electrolyte (PLASMALYTE-A/ISOLYTE-S PH 7 4) IV solution  75 mL/hr Intravenous Continuous   • nystatin (MYCOSTATIN) powder   Topical BID   • ondansetron (ZOFRAN) injection 4 mg  4 mg Intravenous Q6H PRN   • pantoprazole (PROTONIX) injection 40 mg  40 mg Intravenous Q12H NE   • polyethylene glycol (MIRALAX) packet 17 g  17 g Oral Daily PRN       Allergies   Allergen Reactions   • Bupropion      Other reaction(s): Hyper  Other reaction(s): Hyper     • Prednisone    • Tyloxapol "     Other reaction(s): halucination   • Penicillins Hives and Rash     Other reaction(s): hives           Objective     Blood pressure 131/70, pulse 67, temperature (!) 96 8 °F (36 °C), resp  rate 18, height 5' 4\" (1 626 m), weight 122 kg (268 lb 8 3 oz), SpO2 95 %  Body mass index is 46 09 kg/m²  Intake/Output Summary (Last 24 hours) at 4/19/2023 0824  Last data filed at 4/18/2023 1644  Gross per 24 hour   Intake 1000 ml   Output --   Net 1000 ml         PHYSICAL EXAM:      Physical Exam  Constitutional:       General: She is not in acute distress  Appearance: She is obese  HENT:      Head: Normocephalic  Eyes:      General: No scleral icterus  Cardiovascular:      Rate and Rhythm: Normal rate  Pulmonary:      Effort: Pulmonary effort is normal    Abdominal:      General: There is no distension  Palpations: Abdomen is soft  Tenderness: There is no abdominal tenderness  There is no guarding  Musculoskeletal:      Right lower leg: Edema present  Left lower leg: Edema present  Skin:     Coloration: Skin is not jaundiced  Neurological:      Mental Status: She is alert and oriented to person, place, and time  Psychiatric:         Mood and Affect: Mood normal              Lab Results:   I have reviewed pertinent labs:  Labs in last 72 hours:   Recent Labs     04/18/23  1133 04/18/23  1219 04/19/23  0543   WBC 8 96  --  7 23   RBC 5 60*  --  4 63   HGB 14 3  --  11 7   HCT 48 5*  --  38 9     --  268   RDW 19 4*  --  18 1*   NEUTROABS 6 55  --   --    SODIUM 132*   < > 138   K 6 0*   < > 3 9   CL 99   < > 103   CO2 20*   < > 25   BUN 16   < > 15   CREATININE 1 05   < > 0 92   GLUC 178*   < > 106   CALCIUM 9 0   < > 8 5   *   < > 86*   *   < > 164*   ALKPHOS 133*   < > 108*   TP 6 9   < > 5 9*   ALB 4 0   < > 3 5   TBILI 1 73*   < > 1 36*    < > = values in this interval not displayed            Imaging Studies: I have personally reviewed pertinent imaging " studies

## 2023-04-19 NOTE — ASSESSMENT & PLAN NOTE
· Patient presents to the ER with 3-week history of nausea vomiting and weakness  Notes this started after increasing her metoprolol? She states that she did have h  Pylori in the past  No history of liver disease  · Unclear etiology  · Electrolytes on admission within normal limits-continue to monitor  · Trial of antiemetics  · IV protonix 40 mg bid  · IV fluids  · GI consult: plan for possible EGD however, elevated INR no EGD today  May trial food for today  · Trial of clear liquids and will advance as tolerated   Discontinue IVF due to CHF history, monitor fluid status and add fluids if further vomiting

## 2023-04-19 NOTE — PLAN OF CARE
Problem: PHYSICAL THERAPY ADULT  Goal: Performs mobility at highest level of function for planned discharge setting  See evaluation for individualized goals  Description: Treatment/Interventions: Functional transfer training, LE strengthening/ROM, Therapeutic exercise, Endurance training, Patient/family training, Bed mobility, Equipment eval/education, Gait training, Compensatory technique education, Spoke to nursing, OT  Equipment Recommended:  (pt owns RW)       See flowsheet documentation for full assessment, interventions and recommendations  Note: Prognosis: Good  Problem List: Decreased strength, Decreased endurance, Impaired balance, Decreased mobility, Decreased safety awareness, Obesity  Assessment: Pt is a 67 y o  female seen for PT evaluation s/p admission to [address] [place] [address] on 4/18/2023 with Nausea and vomiting  Order placed for PT services  Upon evaluation: Pt is presenting with impaired functional mobility due to decreased strength, decreased ROM, decreased endurance, impaired balance, gait deviations, decreased safety awareness, and fall risk requiring  mod I assistance for bed mobility and SBA assistance for transfers and ambulation with RW   Pt's clinical presentation is currently unstable/unpredictable given the functional mobility deficits above coupled with fall risks as indicated by AM-PAC 6-Clicks: 62/55 as well as impaired balance, polypharmacy, and decreased safety awareness and combined with medical complications of need for input for mobility technique/safety  Pt's PMHx and comorbidities that may affect physical performance and progress include: A fib, CHF, DM, and obesity  Personal factors affecting pt at time of IE include: multi-level environment, inability to perform IADLs, inability to navigate level surfaces without external assistance, and inability to navigate community distances   Pt will benefit from continued skilled PT services to address deficits as defined above and to maximize level of functional mobility to facilitate return toward PLOF and improved QOL  From PT/mobility standpoint, recommendation at time of d/c would be Home PT pending progress in order to reduce fall risk and maximize pt's functional independence and consistency with mobility in order to facilitate return to PLOF  Recommend trial with walker next 1-2 sessions and ther ex next 1-2 sessions  Barriers to Discharge: None  Barriers to Discharge Comments: Pt has family support PRN  PT Discharge Recommendation: Home with home health rehabilitation    See flowsheet documentation for full assessment

## 2023-04-19 NOTE — ASSESSMENT & PLAN NOTE
Lab Results   Component Value Date    HGBA1C 6 8 (H) 06/02/2022       Recent Labs     04/18/23  1948 04/18/23  2344 04/19/23  0543   POCGLU 192* 115 103       Blood Sugar Average: Last 72 hrs:  · (P) [id number] as an outpatient seems to be on metformin, not checking her blood pressures at home per last PCP note  · Would hold metformin given nausea vomiting  · Start on correctional scale with meals, watch for hypoglycemia given poor oral intake

## 2023-04-19 NOTE — OCCUPATIONAL THERAPY NOTE
Occupational Therapy Evaluation     Patient Name: Gavin Brewster  [id number] Date: 4/19/2023  Problem List  Principal Problem:    Nausea and vomiting  Active Problems:    Disease of thyroid gland    Combined systolic and diastolic congestive heart failure (HCC)    Type 2 diabetes mellitus, without long-term current use of insulin (HCC)    Atrial fibrillation (HCC)    Anxiety and depression    Elevated LFTs    Supratherapeutic INR    Past Medical History  Past Medical History:   Diagnosis Date    Arthritis     CHF (congestive heart failure) (HCC)     Disease of thyroid gland      Past Surgical History  Past Surgical History:   Procedure Laterality Date    HYSTERECTOMY           04/19/23 1126   Note Type   Note type Evaluation   Pain Assessment   Pain Assessment Tool 0-10   Pain Score No Pain   Restrictions/Precautions   Other Precautions Chair Alarm; Bed Alarm; Fall Risk;Multiple lines   Home Living   Type of Home House  (ramped entry)   Home Layout Two level;Performs ADLs on one level; Able to live on main level with bedroom/bathroom;1/2 bath on main level  (stair glide to 2nd floor to full shower)   Bathroom Shower/Tub Walk-in shower   Bathroom Toilet   (raised on 1st floor, standard on 2nd floor )   [address] [facility] Grab bars in shower; Shower chair   Home Equipment Walker;Stair glide; Hospital bed  (uses rollator at baseline)   Additional Comments Pt reports living with her daughter and 4 grandchildren in a 2 story home with a ramped entry  Pt ambulates with rollator at baseline  Pt has 1/2 bath on 1st floor and stair glide to 2nd floor to full shower  Prior Function   Level of [place] Independent with ADLs; Independent with functional mobility; Needs assistance with IADLS   Lives With Daughter  (and grandchildren)   Receives Help From Family   IADLs Family/Friend/Other provides transportation   Falls in the last 6 months 1 to 4   Comments Pt completing ADLs and functional ambulation @ Mod I   Pt reports she "completes light meal prep although has assistance for cleaning and all community mobility from her family   ADL   Where Assessed Edge of bed   Grooming Assistance 5  Supervision/Setup   Grooming Deficit Setup   UB Dressing Assistance 5  Supervision/Setup   UB Dressing Deficit Setup   LB Dressing Assistance 4  Minimal Assistance   LB Dressing Deficit Don/doff R sock; Don/doff L sock   Toileting Assistance    (SBA)   Toileting Deficit Verbal cueing;Supervison/safety; Increased time to complete;Grab bar use   Additional Comments Pt completing ADL tasks while seated at EOB  UB Dressing and grooming tasks @ S after set-up  LB Dressing @ Min A for donning socks/pants around feet (Pt reports \"my grandkids can help me at home\"  Pt then completing CM around waist @ SBA with UB supported from RW PRN  Pt completing toileting tasks @ SBA including pericare, CM around waist and STS from toilet with vc'ing for hand placement/safety  Bed Mobility   Supine to Sit 6  Modified independent   Additional items HOB elevated; Bedrails   Additional Comments Pt reports sleps in hospital bed at home   Transfers   Sit to Stand   (SBA)   Additional items Increased time required;Verbal cues   Stand to Sit   (SBA)   Additional items Increased time required;Verbal cues   Stand pivot   (SBA)   Additional items Increased time required;Verbal cues   Toilet transfer   (SBA)   Additional items Increased time required;Verbal cues;Standard toilet   Additional Comments Pt completing functional transfers with use of RW for UB support  SBA for STS and SPT with increased time and occasional cues for safety/technique  Pt with increased difficulty/effort durin STS from toilet d/t low surface   Pt initially unable to complete STS d/t pulling from RW  once therapist encouraged Pt to use grab bar and push RUE from R knee, Pt able to stand with SBA   Balance   Static Sitting Normal   Dynamic Sitting Good   Static Standing Fair +   Dynamic Standing Fair   Activity " Tolerance   Activity Tolerance Patient limited by fatigue   Medical Staff Made Aware Spoke with PT, Maude   Nurse Made Aware Spoke with RN, Chula   RUJUAN Assessment   RUE Assessment WFL   LUE Assessment   LUE Assessment WFL   Hand Function   Gross Motor Coordination Functional   Fine Motor Coordination Functional   Sensation   Light Touch No apparent deficits   Cognition   Overall Cognitive Status WFL   Arousal/Participation Alert; Responsive; Cooperative   Attention Attends with cues to redirect   Orientation Level Oriented X4   Memory Within functional limits   Following Commands Follows one step commands without difficulty   Assessment   Limitation Decreased ADL status; Decreased UE strength;Decreased endurance;Decreased self-care trans;Decreased high-level ADLs   Prognosis Good   Assessment Pt is a 67 y o  female, admitted to [address] [place] [address] 4/18/2023 d/t experiencing abdominal pain and vomiting  Dx: nausea and vomiting  Pt with PMHx impacting their performance during ADL tasks, including: hypothyroidism, DM, anxiety, depression, CHF, a-fib  Prior to admission to the hospital Pt was performing ADLs without physical assistance  IADLs with physical assistance  Functional transfers/ambulation without physical assistance  Cognitive status was PTA was intact  OT order placed to assess Pt's ADLs, cognitive status, and performance during functional tasks in order to maximize safety and independence while making most appropriate d/c recommendations   Pt's clinical presentation is currently unstable/unpredictable given new onset deficits that effect Pt's occupational performance and ability to safely return to [facility] including decrease activity tolerance, decrease standing balance, decrease performance during ADL tasks, decrease safety awareness , decrease UB MS, decrease generalized strength, decrease activity engagement, decrease performance during functional transfers, high fall risk and limited insight to deficits combined with medical complications of abnormal CBC, abnormal blood sugars, edema/swelling, decreased skin integrity, need for input for mobility technique/safety and elevated INR  Personal factors affecting Pt at time of initial evaluation include: multi-level environment, past experience, inability to perform IADLs, inability to navigate community distances, limited insight into impairments, decreased initiation and engagement and recent fall(s)/fall history  Pt will benefit from continued skilled OT services to address deficits as defined above and to maximize level independence/participation during ADLs and functional tasks to facilitate return toward PLOF and improved quality of life  From an occupational therapy standpoint, recommendation at time of d/c would be HHOT  Plan   Treatment Interventions ADL retraining;Functional transfer training;UE strengthening/ROM; Endurance training;Patient/family training;Equipment evaluation/education; Neuromuscular reeducation; Compensatory technique education;Continued evaluation; Energy conservation; Activityengagement   Goal Expiration Date 05/03/23   OT Frequency 1-2x/wk   Recommendation   OT Discharge Recommendation Home with home health rehabilitation   [facility] Daily Activity Inpatient   Lower Body Dressing 3   Bathing 3   Toileting 3   Upper Body Dressing 3   Grooming 3   Eating 4   Daily Activity Raw Score 19   Daily Activity Standardized Score (Calc for Raw Score >=11) 40 22   -[facility] Applied Cognition Inpatient   Following a Speech/Presentation 4   Understanding Ordinary Conversation 4   Taking Medications 4   Remembering Where Things Are Placed or Put Away 4   Remembering List of 4-5 Errands 4   Taking Care of Complicated Tasks 4   Applied Cognition Raw Score 24   Applied Cognition Standardized Score 62 21     The patient's raw score on the AM-PAC Daily Activity Inpatient Short Form is 19   A raw score of greater than or equal to 19 suggests the patient may benefit from discharge to home  Please refer to the recommendation of the Occupational Therapist for safe discharge planning  Pt goals to be met by 5/3/2023    Pt will demonstrate ability to complete LB dressing @ Mod I in order to increase safety and independence during meaningful tasks  Pt will demonstrate ability to rekha/doff socks/shoes while sitting EOB @ Mod I in order to increase safety and independence during meaningful tasks  Pt will demonstrate ability to complete toileting tasks including CM and pericare @ Mod I in order to increase safety and independence during meaningful tasks  Pt will demonstrate ability to complete EOB, chair, toilet/commode transfers @ Mod I in order to increase performance and participation during functional tasks  Pt will demonstrate ability to stand for 7-8 minutes while maintaining G balance with use of RW for UB support PRN  Pt will demonstrate ability to tolerate 30-35 minute OT session with no vc'ing for deep breathing or use of energy conservation techniques in order to increase activity tolerance during functional tasks  Pt will demonstrate Good carryover of use of energy conservation/compensatory strategies during ADLs and functional tasks in order to increase safety and reduce risk for falls  Pt will demonstrate Good attention and participation in continued evaluation of functional ambulation house hold distances in order to assist with safe d/c planning  Pt will attend to continued cognitive assessments 100% of the time in order to provide most appropriate d/c recommendations  Pt will follow 100% simple 2-step commands and be A&O x4 consistently with environmental cues to increase participation in functional activities  Pt will identify 3 areas of interest/hobbies and 1 intervention on how to incorporate into daily life in order to increase interaction with environment and peers as well as increase participation in meaningful tasks     Pt will demonstrate 100% carryover of BUE HEP in order to increase BUE MS and increase performance during functional tasks upon d/c home      Leane Copping OTR/L

## 2023-04-19 NOTE — PLAN OF CARE
Problem: Potential for Falls  Goal: Patient will remain free of falls  Description: INTERVENTIONS:  - Educate patient/family on patient safety including physical limitations  - Instruct patient to call for assistance with activity   - Consult OT/PT to assist with strengthening/mobility   - Keep Call bell within reach  - Keep bed low and locked with side rails adjusted as appropriate  - Keep care items and personal belongings within reach  - Initiate and maintain comfort rounds  - Make Fall Risk Sign visible to staff  - Offer Toileting every  Hours, in advance of need  - Initiate/Maintain alarm  - Obtain necessary fall risk management equipment:   - Apply yellow socks and bracelet for high fall risk patients  - Consider moving patient to room near nurses station  Outcome: Progressing     Problem: MOBILITY - ADULT  Goal: Maintain or return to baseline ADL function  Description: INTERVENTIONS:  -  Assess patient's ability to carry out ADLs; assess patient's baseline for ADL function and identify physical deficits which impact ability to perform ADLs (bathing, care of mouth/teeth, toileting, grooming, dressing, etc )  - Assess/evaluate cause of self-care deficits   - Assess range of motion  - Assess patient's mobility; develop plan if impaired  - Assess patient's need for assistive devices and provide as appropriate  - Encourage maximum independence but intervene and supervise when necessary  - Involve family in performance of ADLs  - Assess for home care needs following discharge   - Consider OT consult to assist with ADL evaluation and planning for discharge  - Provide patient education as appropriate  Outcome: Progressing  Goal: Maintains/Returns to pre admission functional level  Description: INTERVENTIONS:  - Perform BMAT or MOVE assessment daily    - Set and communicate daily mobility goal to care team and patient/family/caregiver     - Collaborate with rehabilitation services on mobility goals if consulted  - Perform Range of Motion  times a day  - Reposition patient every  hours    - Dangle patient  times a day  - Stand patient  times a day  - Ambulate patient  times a day  - Out of bed to chair  times a day   - Out of bed for meals  times a day  - Out of bed for toileting  - Record patient progress and toleration of activity level   Outcome: Progressing     Problem: PAIN - ADULT  Goal: Verbalizes/displays adequate comfort level or baseline comfort level  Description: Interventions:  - Encourage patient to monitor pain and request assistance  - Assess pain using appropriate pain scale  - Administer analgesics based on type and severity of pain and evaluate response  - Implement non-pharmacological measures as appropriate and evaluate response  - Consider cultural and social influences on pain and pain management  - Notify physician/advanced practitioner if interventions unsuccessful or patient reports new pain  Outcome: Progressing     Problem: INFECTION - ADULT  Goal: Absence or prevention of progression during hospitalization  Description: INTERVENTIONS:  - Assess and monitor for signs and symptoms of infection  - Monitor lab/diagnostic results  - Monitor all insertion sites, i e  indwelling lines, tubes, and drains  - Monitor endotracheal if appropriate and nasal secretions for changes in amount and color  - [place] appropriate cooling/warming therapies per order  - Administer medications as ordered  - Instruct and encourage patient and family to use good hand hygiene technique  - Identify and instruct in appropriate isolation precautions for identified infection/condition  Outcome: Progressing  Goal: Absence of fever/infection during neutropenic period  Description: INTERVENTIONS:  - Monitor WBC    Outcome: Progressing     Problem: SAFETY ADULT  Goal: Patient will remain free of falls  Description: INTERVENTIONS:  - Educate patient/family on patient safety including physical limitations  - Instruct patient to call for assistance with activity   - Consult OT/PT to assist with strengthening/mobility   - Keep Call bell within reach  - Keep bed low and locked with side rails adjusted as appropriate  - Keep care items and personal belongings within reach  - Initiate and maintain comfort rounds  - Make Fall Risk Sign visible to staff  - Offer Toileting every  Hours, in advance of need  - Initiate/Maintain alarm  - Obtain necessary fall risk management equipment:   - Apply yellow socks and bracelet for high fall risk patients  - Consider moving patient to room near nurses station  Outcome: Progressing  Goal: Maintain or return to baseline ADL function  Description: INTERVENTIONS:  -  Assess patient's ability to carry out ADLs; assess patient's baseline for ADL function and identify physical deficits which impact ability to perform ADLs (bathing, care of mouth/teeth, toileting, grooming, dressing, etc )  - Assess/evaluate cause of self-care deficits   - Assess range of motion  - Assess patient's mobility; develop plan if impaired  - Assess patient's need for assistive devices and provide as appropriate  - Encourage maximum independence but intervene and supervise when necessary  - Involve family in performance of ADLs  - Assess for home care needs following discharge   - Consider OT consult to assist with ADL evaluation and planning for discharge  - Provide patient education as appropriate  Outcome: Progressing  Goal: Maintains/Returns to pre admission functional level  Description: INTERVENTIONS:  - Perform BMAT or MOVE assessment daily    - Set and communicate daily mobility goal to care team and patient/family/caregiver  - Collaborate with rehabilitation services on mobility goals if consulted  - Perform Range of Motion  times a day  - Reposition patient every  hours    - Dangle patient  times a day  - Stand patient  times a day  - Ambulate patient  times a day  - Out of bed to chair  times a day   - Out of bed for meals times a day  - Out of bed for toileting  - Record patient progress and toleration of activity level   Outcome: Progressing     Problem: DISCHARGE PLANNING  Goal: Discharge to home or other facility with appropriate resources  Description: INTERVENTIONS:  - Identify barriers to discharge w/patient and caregiver  - Arrange for needed discharge resources and transportation as appropriate  - Identify discharge learning needs (meds, wound care, etc )  - Arrange for interpretive services to assist at discharge as needed  - Refer to Case Management Department for coordinating discharge planning if the patient needs post-hospital services based on physician/advanced practitioner order or complex needs related to functional status, cognitive ability, or social support system  Outcome: Progressing     Problem: Knowledge Deficit  Goal: Patient/family/caregiver demonstrates understanding of disease process, treatment plan, medications, and discharge instructions  Description: Complete learning assessment and assess knowledge base    Interventions:  - Provide teaching at level of understanding  - Provide teaching via preferred learning methods  Outcome: Progressing

## 2023-04-19 NOTE — ASSESSMENT & PLAN NOTE
Wt Readings from Last 3 Encounters:   04/19/23 122 kg (268 lb 8 3 oz)   04/10/23 122 kg (267 lb 14 4 oz)   06/07/22 131 kg (289 lb)     · Is currently on spironolactone 25 mg daily and metoprolol  mg bid  · Echo 6/7/2022 with EF 40%, left ventricular moderately dilated with systolic function normal, inferior basilar wall motion abnormality, normal diastolic function  · Euvolemic at this time, monitor fluid status with addition of fluids  Gentle IVF rehydration  · Patient still appears euvolemic but having complaints of cough  IVF discontinued  Spironolactone started again  · CXR pending  · BNP: 2553  · Monitor fluid status and switch to IV diuretics if needed

## 2023-04-19 NOTE — ASSESSMENT & PLAN NOTE
· Maintained on levothyroxine 175 mcg tablet  · Had recent TSH/T4 checked as an outpatient-TSH was low but T4 normal

## 2023-04-19 NOTE — ASSESSMENT & PLAN NOTE
· Patient on coumadin for a fib  She took her last dose on Monday  Had supratherapeutic INR on 4/18  Repeat on 4/19 still elevated  · Holding coumadin at this time     · GI: Do not see a need to reverse given there is no emergent need in performing EGD  · Monitor    Lab Results   Component Value Date    INR 5 64 () 04/19/2023    INR 5 98 () 04/18/2023

## 2023-04-19 NOTE — PROGRESS NOTES
[address]  Progress Note  Name: Axel Barrow  MRN: [id number]  Unit/Bed#: -01 I Date of Admission: 4/18/2023   Date of Service: 4/19/2023 I Hospital Day: 1    Assessment/Plan   * Nausea and vomiting  Assessment & Plan  · Patient presents to the ER with 3-week history of nausea vomiting and weakness  Notes this started after increasing her metoprolol? She states that she did have h  Pylori in the past  No history of liver disease  · Unclear etiology  · Electrolytes on admission within normal limits-continue to monitor  · Trial of antiemetics  · IV protonix 40 mg bid  · IV fluids  · GI consult: plan for possible EGD however, elevated INR no EGD today  May trial food for today  · Trial of clear liquids and will advance as tolerated  Discontinue IVF due to CHF history, monitor fluid status and add fluids if further vomiting    Elevated LFTs  Assessment & Plan  · Presented to the ER with elevated LFTs, also elevated at recent ER visit 4/10  · Had negative hepatitis C testing in 2019  · 4/10 today was taking a lot of Tylenol, notes during this admission that she had stopped taking it because her liver enzymes were elevated    Lab Results   Component Value Date    AST 86 (H) 04/19/2023     (H) 04/18/2023     (H) 04/19/2023     (H) 04/18/2023     · CT abdomen pelvis done 4/10 -slight liver surface nodularity and volume redistribution which may indicate cirrhosis, small abdominal and pelvic ascites  · Had right upper quadrant abdominal ultrasound done in the ER today -hepatomegaly with lobulated hepatic contour which could indicate fibrosis/cirrhosis, possible hemangioma or focal fat on the margin of the right hepatic lobe  · Has been taking large quantities of tylenol, acetaminophen level <10  · Acute hepatitis labs normal     · GI: Acute mixed pattern liver enzyme elevation and is downtrending(similar presentation in 2018), check liver doppler and AFP  Outpatient MRI liver protocol    Supratherapeutic INR  Assessment & Plan  · Patient on coumadin for a fib  She took her last dose on Monday  Had supratherapeutic INR on 4/18  Repeat on 4/19 still elevated  · Holding coumadin at this time  · GI: Do not see a need to reverse given there is no emergent need in performing EGD  · Monitor    Lab Results   Component Value Date    INR 5 64 (HH) 04/19/2023    INR 5 98 (HH) 04/18/2023       Anxiety and depression  Assessment & Plan  · Currently taking effexor 150 mg outpatient  · Continue    Atrial fibrillation ([facility] )  Assessment & Plan  · Currently taking metoprolol  mg bid outpatient  · Start on IV lopressor 5 mg q6h until tolerating oral diet  · ECG in NSR  · Monitor    Type 2 diabetes mellitus, without long-term current use of insulin [facility])  Assessment & Plan  Lab Results   Component Value Date    HGBA1C 6 8 (H) 06/02/2022       Recent Labs     04/18/23  1948 04/18/23  2344 04/19/23  0543   POCGLU 192* 115 103       Blood Sugar Average: Last 72 hrs:  · (P) 833 [id number] as an outpatient seems to be on metformin, not checking her blood pressures at home per last PCP note  · Would hold metformin given nausea vomiting  · Start on correctional scale with meals, watch for hypoglycemia given poor oral intake    Combined systolic and diastolic congestive heart failure ([facility] )  Assessment & Plan  Wt Readings from Last 3 Encounters:   04/19/23 122 kg (268 lb 8 3 oz)   04/10/23 122 kg (267 lb 14 4 oz)   06/07/22 131 kg (289 lb)     · Is currently on spironolactone 25 mg daily and metoprolol  mg bid  · Echo 6/7/2022 with EF 40%, left ventricular moderately dilated with systolic function normal, inferior basilar wall motion abnormality, normal diastolic function  · Euvolemic at this time, monitor fluid status with addition of fluids  Gentle IVF rehydration  · Patient still appears euvolemic but having complaints of cough  IVF discontinued  Spironolactone started again  · CXR pending  · BNP: 2553  · Monitor fluid status and switch to IV diuretics if needed  Disease of thyroid gland  Assessment & Plan  · Maintained on levothyroxine 175 mcg tablet  · Had recent TSH/T4 checked as an outpatient-TSH was low but T4 normal         VTE Pharmacologic Prophylaxis: VTE Score: 5 High Risk (Score >/= 5) - Pharmacological DVT Prophylaxis Contraindicated  Sequential Compression Devices Ordered  Patient Centered Rounds: I performed bedside rounds with nursing staff today  Discussions with Specialists or Other Care Team Provider: nursing, case management, GI    Education and Discussions with Family / Patient: Attempted to update  (daughter) via phone  Left voicemail  Total Time Spent on Date of Encounter in care of patient: 55 minutes This time was spent on one or more of the following: performing physical exam; counseling and coordination of care; obtaining or reviewing history; documenting in the medical record; reviewing/ordering tests, medications or procedures; communicating with other healthcare professionals and discussing with patient's family/caregivers  Current Length of Stay: 1 day(s)  Current Patient Status: Inpatient   Certification Statement: The patient will continue to require additional inpatient hospital stay due to nausea and vomiting, supratherapeutic INR  Discharge Plan: Anticipate discharge in 48-72 hrs to home with home services  Code Status: Level 3 - DNAR and DNI    Subjective:   Patient seen and examined at bedside this morning  She is doing well today  She had no further episodes of nausea or vomiting since yesterday and is willing to try food  Did well with clears and advanced to full liquids  Does feel constipated  She denies fever, chills, CP, SOB        Objective:     Vitals:   Temp (24hrs), Av 8 °F (36 6 °C), Min:96 8 °F (36 °C), Max:98 6 °F (37 °C)    Temp:  [96 8 °F (36 °C)-98 6 °F (37 °C)] 97 9 °F (36 6 °C)  HR:  [64-83] 64  Resp:  [18-20] 18  BP: (118-156)/(59-91) 124/59  SpO2:  [93 %-96 %] 95 %  Body mass index is 46 09 kg/m²  Input and Output Summary (last 24 hours): Intake/Output Summary (Last 24 hours) at 4/19/2023 1658  Last data filed at 4/19/2023 1300  Gross per 24 hour   Intake 960 ml   Output --   Net 960 ml       Physical Exam:   Physical Exam  Vitals reviewed  Constitutional:       General: She is not in acute distress  Appearance: She is obese  She is ill-appearing  She is not toxic-appearing  HENT:      Head: Normocephalic and atraumatic  Nose: Nose normal       Mouth/Throat:      Mouth: Mucous membranes are dry  Eyes:      Pupils: Pupils are equal, round, and reactive to light  Cardiovascular:      Rate and Rhythm: Normal rate and regular rhythm  Heart sounds: Normal heart sounds  Pulmonary:      Effort: Pulmonary effort is normal  No respiratory distress  Breath sounds: No stridor  No wheezing  Abdominal:      General: Bowel sounds are normal  There is no distension  Palpations: Abdomen is soft  There is no mass  Tenderness: There is no abdominal tenderness  Musculoskeletal:         General: Normal range of motion  Cervical back: Normal range of motion  Right lower leg: No edema  Left lower leg: No edema  Skin:     General: Skin is warm and dry  Neurological:      General: No focal deficit present  Mental Status: She is alert and oriented to person, place, and time     Psychiatric:         Mood and Affect: Mood normal          Behavior: Behavior normal           Additional Data:     Labs:  Results from last 7 days   Lab Units 04/19/23  0543 04/18/23  1133   WBC Thousand/uL 7 23 8 96   HEMOGLOBIN g/dL 11 7 14 3   HEMATOCRIT % 38 9 48 5*   PLATELETS Thousands/uL 268 309   NEUTROS PCT %  --  73   LYMPHS PCT %  --  18   MONOS PCT %  --  7   EOS PCT %  --  0     Results from last 7 days   Lab Units 04/19/23  0543   SODIUM mmol/L 138   POTASSIUM mmol/L 3 9   CHLORIDE mmol/L 103   CO2 mmol/L 25   BUN mg/dL 15   CREATININE mg/dL 0 92   ANION GAP mmol/L 10   CALCIUM mg/dL 8 5   ALBUMIN g/dL 3 5   TOTAL BILIRUBIN mg/dL 1 36*   ALK PHOS U/L 108*   ALT U/L 164*   AST U/L 86*   GLUCOSE RANDOM mg/dL 106     Results from last 7 days   Lab Units 04/19/23  0930   INR  5 64*     Results from last 7 days   Lab Units 04/19/23  1602 04/19/23  1252 04/19/23  0543 04/18/23  2344 04/18/23  1948   POC GLUCOSE mg/dl 128 132 103 115 192*         Results from last 7 days   Lab Units 04/19/23  0543 04/18/23  2255 04/18/23 2013 04/18/23  1836   LACTIC ACID mmol/L  --  1 7 2 1*  --    PROCALCITONIN ng/ml 0 14  --   --  0 14       Lines/Drains:  Invasive Devices     Peripheral Intravenous Line  Duration           Peripheral IV 04/18/23 Right Antecubital 1 day                Imaging: Reviewed radiology reports from this admission including: XR chest pending, US RUQ    Recent Cultures (last 7 days):         Last 24 Hours Medication List:   Current Facility-Administered Medications   Medication Dose Route Frequency Provider Last Rate   • aluminum-magnesium hydroxide-simethicone  30 mL Oral Q6H PRN Hollie Harvey PA-C     • docusate sodium  100 mg Oral BID Hollie Harvey PA-C     • insulin lispro  2-12 Units Subcutaneous TID AC Hollie Harvey PA-C     • insulin lispro  2-12 Units Subcutaneous HS Hollie Harvey PA-C     • levothyroxine  175 mcg Oral Daily Dwayne Martínez MD     • metoprolol succinate  100 mg Oral BID Dwayne Martínez MD     • nystatin   Topical BID Hollie Harvey PA-C     • ondansetron  4 mg Intravenous Q6H PRN Hollie Harvey PA-C     • pantoprazole  40 mg Intravenous Q12H [address] Aleida Quigley PA-C     • [START ON 4/20/2023] polyethylene glycol  17 g Oral Daily Hollie Harvey PA-C     • spironolactone  25 mg Oral Daily Hollie Harvey PA-C     • venlafaxine  150 mg Oral Daily Hollie Harvey PA-C Today, Patient Was Seen By: Wade Irene PA-C    **Please Note: This note may have been constructed using a voice recognition system  **

## 2023-04-19 NOTE — ASSESSMENT & PLAN NOTE
· Currently taking metoprolol  mg bid outpatient     · Start on IV lopressor 5 mg q6h until tolerating oral diet  · ECG in NSR  · Monitor

## 2023-04-19 NOTE — PLAN OF CARE
Problem: OCCUPATIONAL THERAPY ADULT  Goal: Performs self-care activities at highest level of function for planned discharge setting  See evaluation for individualized goals  Description: Treatment Interventions: ADL retraining, Functional transfer training, UE strengthening/ROM, Endurance training, Patient/family training, Equipment evaluation/education, Neuromuscular reeducation, Compensatory technique education, Continued evaluation, Energy conservation, Activityengagement          See flowsheet documentation for full assessment, interventions and recommendations  Note: Limitation: Decreased ADL status, Decreased UE strength, Decreased endurance, Decreased self-care trans, Decreased high-level ADLs  Prognosis: Good  Assessment: Pt is a 67 y o  female, admitted to [address] [place] [address] 4/18/2023 d/t experiencing abdominal pain and vomiting  Dx: nausea and vomiting  Pt with PMHx impacting their performance during ADL tasks, including: hypothyroidism, DM, anxiety, depression, CHF, a-fib  Prior to admission to the hospital Pt was performing ADLs without physical assistance  IADLs with physical assistance  Functional transfers/ambulation without physical assistance  Cognitive status was PTA was intact  OT order placed to assess Pt's ADLs, cognitive status, and performance during functional tasks in order to maximize safety and independence while making most appropriate d/c recommendations   Pt's clinical presentation is currently unstable/unpredictable given new onset deficits that effect Pt's occupational performance and ability to safely return to [facility] including decrease activity tolerance, decrease standing balance, decrease performance during ADL tasks, decrease safety awareness , decrease UB MS, decrease generalized strength, decrease activity engagement, decrease performance during functional transfers, high fall risk and limited insight to deficits combined with medical complications of abnormal CBC, abnormal blood sugars, edema/swelling, decreased skin integrity, need for input for mobility technique/safety and elevated INR  Personal factors affecting Pt at time of initial evaluation include: multi-level environment, past experience, inability to perform IADLs, inability to navigate community distances, limited insight into impairments, decreased initiation and engagement and recent fall(s)/fall history  Pt will benefit from continued skilled OT services to address deficits as defined above and to maximize level independence/participation during ADLs and functional tasks to facilitate return toward PLOF and improved quality of life  From an occupational therapy standpoint, recommendation at time of d/c would be HHOT       OT Discharge Recommendation: Home with home health rehabilitation

## 2023-04-20 LAB
ALBUMIN SERPL BCP-MCNC: 3.5 G/DL (ref 3.5–5)
ALP SERPL-CCNC: 98 U/L (ref 34–104)
ALT SERPL W P-5'-P-CCNC: 123 U/L (ref 7–52)
ANION GAP SERPL CALCULATED.3IONS-SCNC: 8 MMOL/L (ref 4–13)
AST SERPL W P-5'-P-CCNC: 47 U/L (ref 13–39)
BILIRUB SERPL-MCNC: 1.32 MG/DL (ref 0.2–1)
BUN SERPL-MCNC: 13 MG/DL (ref 5–25)
CALCIUM SERPL-MCNC: 8.4 MG/DL (ref 8.4–10.2)
CHLORIDE SERPL-SCNC: 102 MMOL/L (ref 96–108)
CO2 SERPL-SCNC: 27 MMOL/L (ref 21–32)
CREAT SERPL-MCNC: 0.91 MG/DL (ref 0.6–1.3)
ERYTHROCYTE [DISTWIDTH] IN BLOOD BY AUTOMATED COUNT: 18.2 % (ref 11.6–15.1)
GFR SERPL CREATININE-BSD FRML MDRD: 63 ML/MIN/1.73SQ M
GLUCOSE SERPL-MCNC: 107 MG/DL (ref 65–140)
GLUCOSE SERPL-MCNC: 112 MG/DL (ref 65–140)
GLUCOSE SERPL-MCNC: 114 MG/DL (ref 65–140)
GLUCOSE SERPL-MCNC: 157 MG/DL (ref 65–140)
GLUCOSE SERPL-MCNC: 163 MG/DL (ref 65–140)
HCT VFR BLD AUTO: 39.4 % (ref 34.8–46.1)
HGB BLD-MCNC: 12 G/DL (ref 11.5–15.4)
INR PPP: 5.84 (ref 0.84–1.19)
MAGNESIUM SERPL-MCNC: 2.3 MG/DL (ref 1.9–2.7)
MCH RBC QN AUTO: 25.9 PG (ref 26.8–34.3)
MCHC RBC AUTO-ENTMCNC: 30.5 G/DL (ref 31.4–37.4)
MCV RBC AUTO: 85 FL (ref 82–98)
PLATELET # BLD AUTO: 264 THOUSANDS/UL (ref 149–390)
PMV BLD AUTO: 9.3 FL (ref 8.9–12.7)
POTASSIUM SERPL-SCNC: 3.6 MMOL/L (ref 3.5–5.3)
PROT SERPL-MCNC: 5.8 G/DL (ref 6.4–8.4)
PROTHROMBIN TIME: 52.2 SECONDS (ref 11.6–14.5)
RBC # BLD AUTO: 4.63 MILLION/UL (ref 3.81–5.12)
SODIUM SERPL-SCNC: 137 MMOL/L (ref 135–147)
WBC # BLD AUTO: 7.87 THOUSAND/UL (ref 4.31–10.16)

## 2023-04-20 RX ADMIN — DOCUSATE SODIUM 100 MG: 100 CAPSULE ORAL at 09:08

## 2023-04-20 RX ADMIN — INSULIN LISPRO 2 UNITS: 100 INJECTION, SOLUTION INTRAVENOUS; SUBCUTANEOUS at 12:15

## 2023-04-20 RX ADMIN — NYSTATIN: 100000 POWDER TOPICAL at 09:07

## 2023-04-20 RX ADMIN — SPIRONOLACTONE 25 MG: 25 TABLET ORAL at 09:07

## 2023-04-20 RX ADMIN — METOPROLOL SUCCINATE 100 MG: 100 TABLET, EXTENDED RELEASE ORAL at 21:26

## 2023-04-20 RX ADMIN — PANTOPRAZOLE SODIUM 40 MG: 40 INJECTION, POWDER, FOR SOLUTION INTRAVENOUS at 09:08

## 2023-04-20 RX ADMIN — NYSTATIN: 100000 POWDER TOPICAL at 17:00

## 2023-04-20 RX ADMIN — LEVOTHYROXINE SODIUM 175 MCG: 175 TABLET ORAL at 05:58

## 2023-04-20 RX ADMIN — DOCUSATE SODIUM 100 MG: 100 CAPSULE ORAL at 16:55

## 2023-04-20 RX ADMIN — METOPROLOL SUCCINATE 100 MG: 100 TABLET, EXTENDED RELEASE ORAL at 09:07

## 2023-04-20 RX ADMIN — VENLAFAXINE HYDROCHLORIDE 150 MG: 150 CAPSULE, EXTENDED RELEASE ORAL at 09:07

## 2023-04-20 RX ADMIN — POLYETHYLENE GLYCOL 3350 17 G: 17 POWDER, FOR SOLUTION ORAL at 09:07

## 2023-04-20 RX ADMIN — PANTOPRAZOLE SODIUM 40 MG: 40 INJECTION, POWDER, FOR SOLUTION INTRAVENOUS at 21:26

## 2023-04-20 RX ADMIN — INSULIN LISPRO 2 UNITS: 100 INJECTION, SOLUTION INTRAVENOUS; SUBCUTANEOUS at 21:26

## 2023-04-20 NOTE — ASSESSMENT & PLAN NOTE
· Patient presents to the ER with 3-week history of nausea vomiting and weakness  Notes this started after increasing her metoprolol? She states that she did have h  Pylori in the past  No history of liver disease  · Unclear etiology  · Electrolytes on admission within normal limits-continue to monitor  · Trial of antiemetics  · IV protonix 40 mg bid  · GI consult: plan for possible EGD however, elevated INR no EGD today  May trial food for today  · Discontinue IVF due to CHF history, monitor fluid status and add fluids if further vomiting  · No further events of nausea or vomiting  · Surgical soft diet with low fiber    Continue to advance as tolerated

## 2023-04-20 NOTE — ASSESSMENT & PLAN NOTE
· Presented to the ER with elevated LFTs, also elevated at recent ER visit 4/10  · Had negative hepatitis C testing in 2019  · 4/10 today was taking a lot of Tylenol, notes during this admission that she had stopped taking it because her liver enzymes were elevated    Lab Results   Component Value Date    AST 47 (H) 04/20/2023    AST 86 (H) 04/19/2023     (H) 04/20/2023     (H) 04/19/2023     · CT abdomen pelvis done 4/10 -slight liver surface nodularity and volume redistribution which may indicate cirrhosis, small abdominal and pelvic ascites  · Had right upper quadrant abdominal ultrasound done in the ER today -hepatomegaly with lobulated hepatic contour which could indicate fibrosis/cirrhosis, possible hemangioma or focal fat on the margin of the right hepatic lobe  · Has been taking large quantities of tylenol, acetaminophen level <10  · Acute hepatitis and AFP labs normal     · Liver doppler normal  · GI: Acute mixed pattern liver enzyme elevation and is downtrending(similar presentation in 2018)  Outpatient MRI liver protocol   Can advance diet to surgical soft with low fiber

## 2023-04-20 NOTE — ASSESSMENT & PLAN NOTE
Lab Results   Component Value Date    HGBA1C 6 8 (H) 06/02/2022       Recent Labs     04/19/23  1252 04/19/23  1602 04/19/23  2045 04/20/23  0714   POCGLU 132 128 152* 112       Blood Sugar Average: Last 72 hrs:  · (P) 133 [id number]   · Currently as an outpatient seems to be on metformin, not checking her blood pressures at home per last PCP note  · Would hold metformin given nausea vomiting  · Start on correctional scale with meals, watch for hypoglycemia given poor oral intake

## 2023-04-20 NOTE — PLAN OF CARE
Problem: Potential for Falls  Goal: Patient will remain free of falls  Description: INTERVENTIONS:  - Educate patient/family on patient safety including physical limitations  - Instruct patient to call for assistance with activity   - Consult OT/PT to assist with strengthening/mobility   - Keep Call bell within reach  - Keep bed low and locked with side rails adjusted as appropriate  - Keep care items and personal belongings within reach  - Initiate and maintain comfort rounds  - Make Fall Risk Sign visible to staff  - Apply yellow socks and bracelet for high fall risk patients  - Consider moving patient to room near nurses station  Outcome: Progressing     Problem: MOBILITY - ADULT  Goal: Maintain or return to baseline ADL function  Description: INTERVENTIONS:  -  Assess patient's ability to carry out ADLs; assess patient's baseline for ADL function and identify physical deficits which impact ability to perform ADLs (bathing, care of mouth/teeth, toileting, grooming, dressing, etc )  - Assess/evaluate cause of self-care deficits   - Assess range of motion  - Assess patient's mobility; develop plan if impaired  - Assess patient's need for assistive devices and provide as appropriate  - Encourage maximum independence but intervene and supervise when necessary  - Involve family in performance of ADLs  - Assess for home care needs following discharge   - Consider OT consult to assist with ADL evaluation and planning for discharge  - Provide patient education as appropriate  Outcome: Progressing  Goal: Maintains/Returns to pre admission functional level  Description: INTERVENTIONS:  - Perform BMAT or MOVE assessment daily    - Set and communicate daily mobility goal to care team and patient/family/caregiver     - Collaborate with rehabilitation services on mobility goals if consulted  - Out of bed for toileting  - Record patient progress and toleration of activity level   Outcome: Progressing     Problem: PAIN - ADULT  Goal: Verbalizes/displays adequate comfort level or baseline comfort level  Description: Interventions:  - Encourage patient to monitor pain and request assistance  - Assess pain using appropriate pain scale  - Administer analgesics based on type and severity of pain and evaluate response  - Implement non-pharmacological measures as appropriate and evaluate response  - Consider cultural and social influences on pain and pain management  - Notify physician/advanced practitioner if interventions unsuccessful or patient reports new pain  Outcome: Progressing     Problem: INFECTION - ADULT  Goal: Absence or prevention of progression during hospitalization  Description: INTERVENTIONS:  - Assess and monitor for signs and symptoms of infection  - Monitor lab/diagnostic results  - Monitor all insertion sites, i e  indwelling lines, tubes, and drains  - Monitor endotracheal if appropriate and nasal secretions for changes in amount and color  - [place] appropriate cooling/warming therapies per order  - Administer medications as ordered  - Instruct and encourage patient and family to use good hand hygiene technique  - Identify and instruct in appropriate isolation precautions for identified infection/condition  Outcome: Progressing  Goal: Absence of fever/infection during neutropenic period  Description: INTERVENTIONS:  - Monitor WBC    Outcome: Progressing     Problem: SAFETY ADULT  Goal: Patient will remain free of falls  Description: INTERVENTIONS:  - Educate patient/family on patient safety including physical limitations  - Instruct patient to call for assistance with activity   - Consult OT/PT to assist with strengthening/mobility   - Keep Call bell within reach  - Keep bed low and locked with side rails adjusted as appropriate  - Keep care items and personal belongings within reach  - Initiate and maintain comfort rounds  - Make Fall Risk Sign visible to staff  - Apply yellow socks and bracelet for high fall risk patients  - Consider moving patient to room near nurses station  Outcome: Progressing  Goal: Maintain or return to baseline ADL function  Description: INTERVENTIONS:  -  Assess patient's ability to carry out ADLs; assess patient's baseline for ADL function and identify physical deficits which impact ability to perform ADLs (bathing, care of mouth/teeth, toileting, grooming, dressing, etc )  - Assess/evaluate cause of self-care deficits   - Assess range of motion  - Assess patient's mobility; develop plan if impaired  - Assess patient's need for assistive devices and provide as appropriate  - Encourage maximum independence but intervene and supervise when necessary  - Involve family in performance of ADLs  - Assess for home care needs following discharge   - Consider OT consult to assist with ADL evaluation and planning for discharge  - Provide patient education as appropriate  Outcome: Progressing  Goal: Maintains/Returns to pre admission functional level  Description: INTERVENTIONS:  - Perform BMAT or MOVE assessment daily    - Set and communicate daily mobility goal to care team and patient/family/caregiver     - Collaborate with rehabilitation services on mobility goals if consulted  - Out of bed for toileting  - Record patient progress and toleration of activity level   Outcome: Progressing     Problem: DISCHARGE PLANNING  Goal: Discharge to home or other facility with appropriate resources  Description: INTERVENTIONS:  - Identify barriers to discharge w/patient and caregiver  - Arrange for needed discharge resources and transportation as appropriate  - Identify discharge learning needs (meds, wound care, etc )  - Arrange for interpretive services to assist at discharge as needed  - Refer to Case Management Department for coordinating discharge planning if the patient needs post-hospital services based on physician/advanced practitioner order or complex needs related to functional status, cognitive ability, or social support system  Outcome: Progressing     Problem: Knowledge Deficit  Goal: Patient/family/caregiver demonstrates understanding of disease process, treatment plan, medications, and discharge instructions  Description: Complete learning assessment and assess knowledge base  Interventions:  - Provide teaching at level of understanding  - Provide teaching via preferred learning methods  Outcome: Progressing     Problem: Nutrition/Hydration-ADULT  Goal: Nutrient/Hydration intake appropriate for improving, restoring or maintaining nutritional needs  Description: Monitor and assess patient's nutrition/hydration status for malnutrition  Collaborate with interdisciplinary team and initiate plan and interventions as ordered  Monitor patient's weight and dietary intake as ordered or per policy  Utilize nutrition screening tool and intervene as necessary  Determine patient's food preferences and provide high-protein, high-caloric foods as appropriate       INTERVENTIONS:  - Monitor oral intake, urinary output, labs, and treatment plans  - Assess nutrition and hydration status and recommend course of action  - Evaluate amount of meals eaten  - Assist patient with eating if necessary   - Allow adequate time for meals  - Recommend/ encourage appropriate diets, oral nutritional supplements, and vitamin/mineral supplements  - Order, calculate, and assess calorie counts as needed  - Recommend, monitor, and adjust tube feedings and TPN/PPN based on assessed needs  - Assess need for intravenous fluids  - Provide specific nutrition/hydration education as appropriate  - Include patient/family/caregiver in decisions related to nutrition  Outcome: Progressing

## 2023-04-20 NOTE — CASE MANAGEMENT
Case Management Assessment & Discharge Planning Note    Patient name Yadira Valentine  Location [address] [id number] MRN [id number]  : 1950 Date 2023       Current Admission Date: 2023  Current Admission Diagnosis:Nausea and vomiting   Patient Active Problem List    Diagnosis Date Noted   • Supratherapeutic INR 2023   • Disease of thyroid gland    • Combined systolic and diastolic congestive heart failure (HCC)    • Type 2 diabetes mellitus, without long-term current use of insulin (HCC)    • Atrial fibrillation (HCC)    • Anxiety and depression    • Nausea and vomiting    • Elevated LFTs       LOS (days): 2  Geometric Mean LOS (GMLOS) (days): 2 60  Days to GMLOS:0 9     OBJECTIVE:    Risk of Unplanned Readmission Score: 11 35     CM met with patient at the bedside,baseline information was obtained  CM discussed the role of CM in helping the patient develop a discharge plan and assist the patient in carrying out their plan  Current admission status: Inpatient       Preferred Pharmacy:   [address] [place] [address]  Phone: [phone] Fax: [phone]    Primary Care Provider: Merline Bustard, MD    Primary Insurance: MEDICARE  Secondary Insurance:     ASSESSMENT:  Mary Shi Proxies    There are no active Health Care Proxies on file         Advance Directives  Does patient have a [address]?: Yes  Does patient have Advance Directives?: Yes  Advance Directives: Living will  Primary Contact: SolomonKari (daughter)         Readmission Root Cause  30 Day Readmission: No    Patient Information  Admitted from[de-identified] Home  Mental Status: Alert  During Assessment patient was accompanied by: Not accompanied during assessment  Assessment information provided by[de-identified] Patient  Primary Caregiver: Self  Support Systems: Self, Daughter, Family members  [place] of Residence: [facility] do you live in?: [place] entry access options  Select all that apply : Ramp  Type of Current Residence: 2 story home  Upon entering residence, is there a bedroom on the main floor (no further steps)?: Yes  Upon entering residence, is there a bathroom on the main floor (no further steps)?: Yes  In the last 12 months, was there a time when you were not able to pay the mortgage or rent on time?: No  In the last 12 months, how many places have you lived?: 1  In the last 12 months, was there a time when you did not have a steady place to sleep or slept in a shelter (including now)?: No  Homeless/housing insecurity resource given?: N/A  Living Arrangements: Lives w/ Daughter, Lives w/ Extended Family  Is patient a ?: No    Activities of Daily Living Prior to Admission  Functional Status: Independent  Completes ADLs independently?: Yes  Ambulates independently?: Yes  Does patient use assisted devices?: Yes  Assisted Devices (DME) used:  Shower Chair, Stair Chair/Glide, Walker  Does patient currently own DME?: Yes  What DME does the patient currently own?: Shower Chair, Stair Chair/Glide, Walker  Does patient have a history of Outpatient Therapy (PT/OT)?: No  Does the patient have a history of Short-Term Rehab?: No  Does patient have a history of HHC?: Yes  Does patient currently have [place] [id number]?: No         Patient Information Continued  Income Source: SSI/SSD  Does patient have prescription coverage?: Yes  Within the past 12 months, you worried that your food would run out before you got the money to buy more : Never true  Within the past 12 months, the food you bought just didn't last and you didn't have money to get more : Never true  Food insecurity resource given?: N/A  Does patient receive dialysis treatments?: No  Does patient have a history of substance abuse?: No  Does patient have a history of Mental Health Diagnosis?: No         Means of Transportation  Means of Transport to [facility][de-identified] Family transport  In the past 12 months, has lack of transportation kept you from medical appointments or from getting medications?: No  In the past 12 months, has lack of transportation kept you from meetings, work, or from getting things needed for daily living?: No  Was application for public transport provided?: N/A        DISCHARGE DETAILS:    Discharge planning discussed with[de-identified] patient  Freedom of Choice: Yes  Comments - Freedom of Choice: Pt agreeable to blanket referral for [address] PT/OT     Were Treatment Team discharge recommendations reviewed with patient/caregiver?: Yes  Did patient/caregiver verbalize understanding of patient care needs?: Yes  Were patient/caregiver advised of the risks associated with not following Treatment Team discharge recommendations?: Yes         [facility]         Is the patient interested in [address] at discharge?: Yes  [address] requested[de-identified] Occupational Therapy, Physical Therapy, [facility] Provider[de-identified] PCP  Home Health Services Needed[de-identified] Evaluate Functional Status and Safety, Gait/ADL Training, Strengthening/Theraputic Exercises to Improve Function, Other (comment) (Cardiopulmonary assessment)  Supporting Clincal Findings[de-identified] Fatigues Easliy in [employer], [employer] Endurance    DME Referral Provided  Referral made for DME?: No    Other Referral/Resources/Interventions Provided:  Interventions: HHC         Treatment Team Recommendation: Home with [address]  Discharge Destination Plan[de-identified] Home with [address]      CM at bedside to discuss post discahrge options  Pt agreeable to a blanket referral for HHC  Referral placed in [address] [place] [address] Road

## 2023-04-20 NOTE — PLAN OF CARE
Problem: Potential for Falls  Goal: Patient will remain free of falls  Description: INTERVENTIONS:  - Educate patient/family on patient safety including physical limitations  - Instruct patient to call for assistance with activity   - Consult OT/PT to assist with strengthening/mobility   - Keep Call bell within reach  - Keep bed low and locked with side rails adjusted as appropriate  - Keep care items and personal belongings within reach  - Initiate and maintain comfort rounds  - Make Fall Risk Sign visible to staff    - Apply yellow socks and bracelet for high fall risk patients  - Consider moving patient to room near nurses station  Outcome: Progressing     Problem: MOBILITY - ADULT  Goal: Maintain or return to baseline ADL function  Description: INTERVENTIONS:  -  Assess patient's ability to carry out ADLs; assess patient's baseline for ADL function and identify physical deficits which impact ability to perform ADLs (bathing, care of mouth/teeth, toileting, grooming, dressing, etc )  - Assess/evaluate cause of self-care deficits   - Assess range of motion  - Assess patient's mobility; develop plan if impaired  - Assess patient's need for assistive devices and provide as appropriate  - Encourage maximum independence but intervene and supervise when necessary  - Involve family in performance of ADLs  - Assess for home care needs following discharge   - Consider OT consult to assist with ADL evaluation and planning for discharge  - Provide patient education as appropriate  Outcome: Progressing     Problem: PAIN - ADULT  Goal: Verbalizes/displays adequate comfort level or baseline comfort level  Description: Interventions:  - Encourage patient to monitor pain and request assistance  - Assess pain using appropriate pain scale  - Administer analgesics based on type and severity of pain and evaluate response  - Implement non-pharmacological measures as appropriate and evaluate response  - Consider cultural and social influences on pain and pain management  - Notify physician/advanced practitioner if interventions unsuccessful or patient reports new pain  Outcome: Progressing     Problem: SAFETY ADULT  Goal: Patient will remain free of falls  Description: INTERVENTIONS:  - Educate patient/family on patient safety including physical limitations  - Instruct patient to call for assistance with activity   - Consult OT/PT to assist with strengthening/mobility   - Keep Call bell within reach  - Keep bed low and locked with side rails adjusted as appropriate  - Keep care items and personal belongings within reach  - Initiate and maintain comfort rounds  - Make Fall Risk Sign visible to staff    - Apply yellow socks and bracelet for high fall risk patients  - Consider moving patient to room near nurses station  Outcome: Progressing

## 2023-04-20 NOTE — ASSESSMENT & PLAN NOTE
· Patient on coumadin for a fib  She took her last dose on Monday  Had supratherapeutic INR on 4/18  Repeat on 4/19 still elevated  · Holding coumadin at this time  · GI: Do not see a need to reverse given there is no emergent need in performing EGD   Continue to monitor  · Monitor    Lab Results   Component Value Date    INR 5 84 () 04/20/2023    INR 5 64 () 04/19/2023    INR 5 98 () 04/18/2023

## 2023-04-20 NOTE — PROGRESS NOTES
[address]  Progress Note  Name: Ivna Harp  MRN: [id number]  Unit/Bed#: -01 I Date of Admission: 4/18/2023   Date of Service: 4/20/2023 I Hospital Day: 2    Assessment/Plan   * Nausea and vomiting  Assessment & Plan  · Patient presents to the ER with 3-week history of nausea vomiting and weakness  Notes this started after increasing her metoprolol? She states that she did have h  Pylori in the past  No history of liver disease  · Unclear etiology  · Electrolytes on admission within normal limits-continue to monitor  · Trial of antiemetics  · IV protonix 40 mg bid  · GI consult: plan for possible EGD however, elevated INR no EGD today  May trial food for today  · Discontinue IVF due to CHF history, monitor fluid status and add fluids if further vomiting  · No further events of nausea or vomiting  · Surgical soft diet with low fiber  Continue to advance as tolerated    Supratherapeutic INR  Assessment & Plan  · Patient on coumadin for a fib  She took her last dose on Monday  Had supratherapeutic INR on 4/18  Repeat on 4/19 still elevated  · Holding coumadin at this time  · GI: Do not see a need to reverse given there is no emergent need in performing EGD   Continue to monitor  · Monitor    Lab Results   Component Value Date    INR 5 84 (HH) 04/20/2023    INR 5 64 (HH) 04/19/2023    INR 5 98 (HH) 04/18/2023       Elevated LFTs  Assessment & Plan  · Presented to the ER with elevated LFTs, also elevated at recent ER visit 4/10  · Had negative hepatitis C testing in 2019  · 4/10 today was taking a lot of Tylenol, notes during this admission that she had stopped taking it because her liver enzymes were elevated    Lab Results   Component Value Date    AST 47 (H) 04/20/2023    AST 86 (H) 04/19/2023     (H) 04/20/2023     (H) 04/19/2023     · CT abdomen pelvis done 4/10 -slight liver surface nodularity and volume redistribution which may indicate cirrhosis, small abdominal and pelvic ascites  · Had right upper quadrant abdominal ultrasound done in the ER today -hepatomegaly with lobulated hepatic contour which could indicate fibrosis/cirrhosis, possible hemangioma or focal fat on the margin of the right hepatic lobe  · Has been taking large quantities of tylenol, acetaminophen level <10  · Acute hepatitis and AFP labs normal     · Liver doppler normal  · GI: Acute mixed pattern liver enzyme elevation and is downtrending(similar presentation in 2018)  Outpatient MRI liver protocol  Can advance diet to surgical soft with low fiber    Anxiety and depression  Assessment & Plan  · Currently taking effexor 150 mg outpatient  · Continue    Atrial fibrillation (HCC)  Assessment & Plan  · Currently taking metoprolol  mg bid outpatient  · ECG in NSR  · Monitor    Type 2 diabetes mellitus, without long-term current use of insulin [facility])  Assessment & Plan  Lab Results   Component Value Date    HGBA1C 6 8 (H) 06/02/2022       Recent Labs     04/19/23  1252 04/19/23  1602 04/19/23  2045 04/20/23  0714   POCGLU 132 128 152* 112       Blood Sugar Average: Last 72 hrs:  · (P) 133 [id number]   · Currently as an outpatient seems to be on metformin, not checking her blood pressures at home per last PCP note  · Would hold metformin given nausea vomiting  · Start on correctional scale with meals, watch for hypoglycemia given poor oral intake    Combined systolic and diastolic congestive heart failure ([address] )  Assessment & Plan  Wt Readings from Last 3 Encounters:   04/20/23 123 kg (270 lb 1 oz)   04/10/23 122 kg (267 lb 14 4 oz)   06/07/22 131 kg (289 lb)     · Is currently on spironolactone 25 mg daily and metoprolol  mg bid  · Echo 6/7/2022 with EF 40%, left ventricular moderately dilated with systolic function normal, inferior basilar wall motion abnormality, normal diastolic function  · Euvolemic at this time, monitor fluid status with addition of fluids   Gentle IVF rehydration  · Patient still appears euvolemic but having complaints of cough  IVF discontinued  Spironolactone started again  · CXR pending  · BNP: 2553  · Monitor fluid status and switch to IV diuretics if needed  Disease of thyroid gland  Assessment & Plan  · Maintained on levothyroxine 175 mcg tablet  · Had recent TSH/T4 checked as an outpatient-TSH was low but T4 normal           VTE Pharmacologic Prophylaxis: VTE Score: 5 High Risk (Score >/= 5) - Pharmacological DVT Prophylaxis Contraindicated  Sequential Compression Devices Ordered  Patient Centered Rounds: I performed bedside rounds with nursing staff today  Discussions with Specialists or Other Care Team Provider: GI    Education and Discussions with Family / Patient: Updated  (daughter) via phone  Total Time Spent on Date of Encounter in care of patient: 45 minutes This time was spent on one or more of the following: performing physical exam; counseling and coordination of care; obtaining or reviewing history; documenting in the medical record; reviewing/ordering tests, medications or procedures; communicating with other healthcare professionals and discussing with patient's family/caregivers  Current Length of Stay: 2 day(s)  Current Patient Status: Inpatient   Certification Statement: The patient will continue to require additional inpatient hospital stay due to Supratherapeutic INR  Discharge Plan: Pending improvement of INR    Code Status: Level 3 - DNAR and DNI    Subjective:   Patient states that she is feeling much better today compared to when she came in  States she has not had any further nausea or vomiting  States that the laxatives are making her have bowel movements  States that she tolerated her clear liquid diet well and would like to advance her diet  Denies chest pain or shortness of breath      Objective:     Vitals:   Temp (24hrs), Av 5 °F (36 4 °C), Min:97 °F (36 1 °C), Max:97 9 °F (36 6 °C)    Temp:  [97 °F (36 1 °C)-97 9 °F (36 6 °C)] 97 °F (36 1 °C)  HR:  [59-66] 59  Resp:  [18] 18  BP: (123-139)/(59-76) 138/75  SpO2:  [94 %-95 %] 95 %  Body mass index is 46 36 kg/m²  Input and Output Summary (last 24 hours): Intake/Output Summary (Last 24 hours) at 4/20/2023 1005  Last data filed at 4/19/2023 1857  Gross per 24 hour   Intake 1080 ml   Output --   Net 1080 ml       Physical Exam:   Physical Exam  Vitals reviewed  Constitutional:       General: She is not in acute distress  Appearance: Normal appearance  She is obese  She is not ill-appearing  HENT:      Head: Normocephalic and atraumatic  Nose: Nose normal       Mouth/Throat:      Mouth: Mucous membranes are moist       Pharynx: Oropharynx is clear  Eyes:      Extraocular Movements: Extraocular movements intact  Conjunctiva/sclera: Conjunctivae normal    Cardiovascular:      Rate and Rhythm: Normal rate and regular rhythm  Pulses: Normal pulses  Heart sounds: Normal heart sounds  No murmur heard  Pulmonary:      Effort: Pulmonary effort is normal  No respiratory distress  Breath sounds: Normal breath sounds  No wheezing  Abdominal:      General: Abdomen is flat  Bowel sounds are normal  There is no distension  Palpations: Abdomen is soft  Tenderness: There is no abdominal tenderness  There is no guarding  Musculoskeletal:         General: Normal range of motion  Cervical back: Normal range of motion  Right lower leg: No edema  Left lower leg: No edema  Skin:     General: Skin is warm  Neurological:      General: No focal deficit present  Mental Status: She is alert  Mental status is at baseline  Motor: No weakness  Psychiatric:         Mood and Affect: Mood normal          Behavior: Behavior normal          Thought Content:  Thought content normal          Judgment: Judgment normal           Additional Data:     Labs:  Results from last 7 days   Lab Units 04/20/23  0545 04/19/23  0543 04/18/23  1133   WBC Thousand/uL 7 87   < > 8 96   HEMOGLOBIN g/dL 12 0   < > 14 3   HEMATOCRIT % 39 4   < > 48 5*   PLATELETS Thousands/uL 264   < > 309   NEUTROS PCT %  --   --  73   LYMPHS PCT %  --   --  18   MONOS PCT %  --   --  7   EOS PCT %  --   --  0    < > = values in this interval not displayed       Results from last 7 days   Lab Units 04/20/23  0545   SODIUM mmol/L 137   POTASSIUM mmol/L 3 6   CHLORIDE mmol/L 102   CO2 mmol/L 27   BUN mg/dL 13   CREATININE mg/dL 0 91   ANION GAP mmol/L 8   CALCIUM mg/dL 8 4   ALBUMIN g/dL 3 5   TOTAL BILIRUBIN mg/dL 1 32*   ALK PHOS U/L 98   ALT U/L 123*   AST U/L 47*   GLUCOSE RANDOM mg/dL 107     Results from last 7 days   Lab Units 04/20/23  0545   INR  5 84*     Results from last 7 days   Lab Units 04/20/23  0714 04/19/23  2045 04/19/23  1602 04/19/23  1252 04/19/23  0543 04/18/23  2344 04/18/23  1948   POC GLUCOSE mg/dl 112 152* 128 132 103 115 192*         Results from last 7 days   Lab Units 04/19/23  0543 04/18/23  2255 04/18/23 2013 04/18/23  1836   LACTIC ACID mmol/L  --  1 7 2 1*  --    PROCALCITONIN ng/ml 0 14  --   --  0 14       Lines/Drains:  Invasive Devices     Peripheral Intravenous Line  Duration           Peripheral IV 04/18/23 Right Antecubital 1 day                      Imaging: Reviewed radiology reports from this admission including: Ultrasound liver Doppler    Recent Cultures (last 7 days):         Last 24 Hours Medication List:   Current Facility-Administered Medications   Medication Dose Route Frequency Provider Last Rate   • aluminum-magnesium hydroxide-simethicone  30 mL Oral Q6H PRN Mariel Hammonds PA-C     • docusate sodium  100 mg Oral BID Mariel Hammonds PA-C     • insulin lispro  2-12 Units Subcutaneous TID AC Mariel Hammonds PA-C     • insulin lispro  2-12 Units Subcutaneous HS Mariel Hammonds PA-C     • levothyroxine  175 mcg Oral Daily Eliana Tejeda MD     • metoprolol succinate  100 mg Oral BID Barbara Lara MD     • nystatin   Topical BID Romelle Cease Dann PA-C     • ondansetron  4 mg Intravenous Q6H PRN Samantha Bowser PA-C     • pantoprazole  40 mg Intravenous Q12H [address] Samantha Bowser PA-C     • polyethylene glycol  17 g Oral Daily Romelle Cease Dann PA-C     • spironolactone  25 mg Oral Daily Ismaellle Cortes Quigley PA-C     • venlafaxine  150 mg Oral Daily Samantha Bowser PA-C          Today, Patient Was Seen By: Demarco Duran PA-C    **Please Note: This note may have been constructed using a voice recognition system  **

## 2023-04-20 NOTE — ASSESSMENT & PLAN NOTE
Wt Readings from Last 3 Encounters:   04/20/23 123 kg (270 lb 1 oz)   04/10/23 122 kg (267 lb 14 4 oz)   06/07/22 131 kg (289 lb)     · Is currently on spironolactone 25 mg daily and metoprolol  mg bid  · Echo 6/7/2022 with EF 40%, left ventricular moderately dilated with systolic function normal, inferior basilar wall motion abnormality, normal diastolic function  · Euvolemic at this time, monitor fluid status with addition of fluids  Gentle IVF rehydration  · Patient still appears euvolemic but having complaints of cough  IVF discontinued  Spironolactone started again  · CXR pending  · BNP: 2553  · Monitor fluid status and switch to IV diuretics if needed

## 2023-04-21 LAB
ALBUMIN SERPL BCP-MCNC: 3.8 G/DL (ref 3.5–5)
ALP SERPL-CCNC: 101 U/L (ref 34–104)
ALT SERPL W P-5'-P-CCNC: 99 U/L (ref 7–52)
ANION GAP SERPL CALCULATED.3IONS-SCNC: 8 MMOL/L (ref 4–13)
AST SERPL W P-5'-P-CCNC: 33 U/L (ref 13–39)
BILIRUB SERPL-MCNC: 1.55 MG/DL (ref 0.2–1)
BUN SERPL-MCNC: 13 MG/DL (ref 5–25)
CALCIUM SERPL-MCNC: 8.8 MG/DL (ref 8.4–10.2)
CHLORIDE SERPL-SCNC: 100 MMOL/L (ref 96–108)
CO2 SERPL-SCNC: 28 MMOL/L (ref 21–32)
CREAT SERPL-MCNC: 0.89 MG/DL (ref 0.6–1.3)
EST. AVERAGE GLUCOSE BLD GHB EST-MCNC: 148 MG/DL
GFR SERPL CREATININE-BSD FRML MDRD: 64 ML/MIN/1.73SQ M
GLUCOSE SERPL-MCNC: 100 MG/DL (ref 65–140)
GLUCOSE SERPL-MCNC: 101 MG/DL (ref 65–140)
GLUCOSE SERPL-MCNC: 102 MG/DL (ref 65–140)
GLUCOSE SERPL-MCNC: 146 MG/DL (ref 65–140)
GLUCOSE SERPL-MCNC: 156 MG/DL (ref 65–140)
HBA1C MFR BLD: 6.8 %
INR PPP: 3.79 (ref 0.84–1.19)
POTASSIUM SERPL-SCNC: 4.1 MMOL/L (ref 3.5–5.3)
PROT SERPL-MCNC: 6.3 G/DL (ref 6.4–8.4)
PROTHROMBIN TIME: 37.4 SECONDS (ref 11.6–14.5)
SODIUM SERPL-SCNC: 136 MMOL/L (ref 135–147)

## 2023-04-21 PROCEDURE — 05HC33Z INSERTION OF INFUSION DEVICE INTO LEFT BASILIC VEIN, PERCUTANEOUS APPROACH: ICD-10-PCS | Performed by: STUDENT IN AN ORGANIZED HEALTH CARE EDUCATION/TRAINING PROGRAM

## 2023-04-21 RX ADMIN — DOCUSATE SODIUM 100 MG: 100 CAPSULE ORAL at 08:52

## 2023-04-21 RX ADMIN — NYSTATIN: 100000 POWDER TOPICAL at 17:04

## 2023-04-21 RX ADMIN — NYSTATIN: 100000 POWDER TOPICAL at 08:54

## 2023-04-21 RX ADMIN — METOPROLOL SUCCINATE 100 MG: 100 TABLET, EXTENDED RELEASE ORAL at 21:30

## 2023-04-21 RX ADMIN — PANTOPRAZOLE SODIUM 40 MG: 40 INJECTION, POWDER, FOR SOLUTION INTRAVENOUS at 21:30

## 2023-04-21 RX ADMIN — POLYETHYLENE GLYCOL 3350 17 G: 17 POWDER, FOR SOLUTION ORAL at 08:52

## 2023-04-21 RX ADMIN — INSULIN LISPRO 2 UNITS: 100 INJECTION, SOLUTION INTRAVENOUS; SUBCUTANEOUS at 17:02

## 2023-04-21 RX ADMIN — PANTOPRAZOLE SODIUM 40 MG: 40 INJECTION, POWDER, FOR SOLUTION INTRAVENOUS at 08:52

## 2023-04-21 RX ADMIN — SPIRONOLACTONE 25 MG: 25 TABLET ORAL at 08:52

## 2023-04-21 RX ADMIN — METOPROLOL SUCCINATE 100 MG: 100 TABLET, EXTENDED RELEASE ORAL at 08:52

## 2023-04-21 RX ADMIN — VENLAFAXINE HYDROCHLORIDE 150 MG: 150 CAPSULE, EXTENDED RELEASE ORAL at 08:52

## 2023-04-21 RX ADMIN — LEVOTHYROXINE SODIUM 175 MCG: 175 TABLET ORAL at 05:17

## 2023-04-21 NOTE — ASSESSMENT & PLAN NOTE
· Patient on coumadin for a fib  She took her last dose on Monday  Had supratherapeutic INR on 4/18  Repeat on 4/19 still elevated  · Holding coumadin at this time  · GI: Do not see a need to reverse given there is no emergent need in performing EGD  Continue to monitor  · Monitor  · Midline placed due to difficulty obtaining labs  · INR improving now that liver enzymes have stabilized   Expect to restart coumadin tomorrow pending INR level    Lab Results   Component Value Date    INR 3 79 (H) 04/21/2023    INR 5 84 (HH) 04/20/2023    INR 5 64 (HH) 04/19/2023

## 2023-04-21 NOTE — PLAN OF CARE
Problem: Potential for Falls  Goal: Patient will remain free of falls  Description: INTERVENTIONS:  - Educate patient/family on patient safety including physical limitations  - Instruct patient to call for assistance with activity   - Consult OT/PT to assist with strengthening/mobility   - Keep Call bell within reach  - Keep bed low and locked with side rails adjusted as appropriate  - Keep care items and personal belongings within reach  - Initiate and maintain comfort rounds  - Make Fall Risk Sign visible to staff  - Offer Toileting every 2 Hours, in advance of need  - Initiate/Maintain bed alarm    - Apply yellow socks and bracelet for high fall risk patients  - Consider moving patient to room near nurses station  Outcome: Progressing     Problem: MOBILITY - ADULT  Goal: Maintain or return to baseline ADL function  Description: INTERVENTIONS:  -  Assess patient's ability to carry out ADLs; assess patient's baseline for ADL function and identify physical deficits which impact ability to perform ADLs (bathing, care of mouth/teeth, toileting, grooming, dressing, etc )  - Assess/evaluate cause of self-care deficits   - Assess range of motion  - Assess patient's mobility; develop plan if impaired  - Assess patient's need for assistive devices and provide as appropriate  - Encourage maximum independence but intervene and supervise when necessary  - Involve family in performance of ADLs  - Assess for home care needs following discharge   - Consider OT consult to assist with ADL evaluation and planning for discharge  - Provide patient education as appropriate  Outcome: Progressing  Goal: Maintains/Returns to pre admission functional level  Description: INTERVENTIONS:  - Perform BMAT or MOVE assessment daily    - Set and communicate daily mobility goal to care team and patient/family/caregiver     - Collaborate with rehabilitation services on mobility goals if consulted  - Stand patient 3 times a day  - Ambulate patient 3 times a day  - Out of bed to chair 3 times a day   - Out of bed for meals 3 times a day  - Out of bed for toileting  - Record patient progress and toleration of activity level   Outcome: Progressing     Problem: PAIN - ADULT  Goal: Verbalizes/displays adequate comfort level or baseline comfort level  Description: Interventions:  - Encourage patient to monitor pain and request assistance  - Assess pain using appropriate pain scale  - Administer analgesics based on type and severity of pain and evaluate response  - Implement non-pharmacological measures as appropriate and evaluate response  - Consider cultural and social influences on pain and pain management  - Notify physician/advanced practitioner if interventions unsuccessful or patient reports new pain  Outcome: Progressing     Problem: INFECTION - ADULT  Goal: Absence or prevention of progression during hospitalization  Description: INTERVENTIONS:  - Assess and monitor for signs and symptoms of infection  - Monitor lab/diagnostic results  - Monitor all insertion sites, i e  indwelling lines, tubes, and drains  - Monitor endotracheal if appropriate and nasal secretions for changes in amount and color  - [place] appropriate cooling/warming therapies per order  - Administer medications as ordered  - Instruct and encourage patient and family to use good hand hygiene technique  - Identify and instruct in appropriate isolation precautions for identified infection/condition  Outcome: Progressing  Goal: Absence of fever/infection during neutropenic period  Description: INTERVENTIONS:  - Monitor WBC    Outcome: Progressing     Problem: SAFETY ADULT  Goal: Patient will remain free of falls  Description: INTERVENTIONS:  - Educate patient/family on patient safety including physical limitations  - Instruct patient to call for assistance with activity   - Consult OT/PT to assist with strengthening/mobility   - Keep Call bell within reach  - Keep bed low and locked with side rails adjusted as appropriate  - Keep care items and personal belongings within reach  - Initiate and maintain comfort rounds  - Make Fall Risk Sign visible to staff  - Offer Toileting every 2 Hours, in advance of need  - Initiate/Maintain bed alarm    - Apply yellow socks and bracelet for high fall risk patients  - Consider moving patient to room near nurses station  Outcome: Progressing  Goal: Maintain or return to baseline ADL function  Description: INTERVENTIONS:  -  Assess patient's ability to carry out ADLs; assess patient's baseline for ADL function and identify physical deficits which impact ability to perform ADLs (bathing, care of mouth/teeth, toileting, grooming, dressing, etc )  - Assess/evaluate cause of self-care deficits   - Assess range of motion  - Assess patient's mobility; develop plan if impaired  - Assess patient's need for assistive devices and provide as appropriate  - Encourage maximum independence but intervene and supervise when necessary  - Involve family in performance of ADLs  - Assess for home care needs following discharge   - Consider OT consult to assist with ADL evaluation and planning for discharge  - Provide patient education as appropriate  Outcome: Progressing  Goal: Maintains/Returns to pre admission functional level  Description: INTERVENTIONS:  - Perform BMAT or MOVE assessment daily    - Set and communicate daily mobility goal to care team and patient/family/caregiver     - Collaborate with rehabilitation services on mobility goals if consulted  - Stand patient 3 times a day  - Ambulate patient 3 times a day  - Out of bed to chair 3 times a day   - Out of bed for meals 3 times a day  - Out of bed for toileting  - Record patient progress and toleration of activity level   Outcome: Progressing     Problem: DISCHARGE PLANNING  Goal: Discharge to home or other facility with appropriate resources  Description: INTERVENTIONS:  - Identify barriers to discharge w/patient and caregiver  - Arrange for needed discharge resources and transportation as appropriate  - Identify discharge learning needs (meds, wound care, etc )  - Arrange for interpretive services to assist at discharge as needed  - Refer to Case Management Department for coordinating discharge planning if the patient needs post-hospital services based on physician/advanced practitioner order or complex needs related to functional status, cognitive ability, or social support system  Outcome: Progressing     Problem: Knowledge Deficit  Goal: Patient/family/caregiver demonstrates understanding of disease process, treatment plan, medications, and discharge instructions  Description: Complete learning assessment and assess knowledge base  Interventions:  - Provide teaching at level of understanding  - Provide teaching via preferred learning methods  Outcome: Progressing     Problem: Nutrition/Hydration-ADULT  Goal: Nutrient/Hydration intake appropriate for improving, restoring or maintaining nutritional needs  Description: Monitor and assess patient's nutrition/hydration status for malnutrition  Collaborate with interdisciplinary team and initiate plan and interventions as ordered  Monitor patient's weight and dietary intake as ordered or per policy  Utilize nutrition screening tool and intervene as necessary  Determine patient's food preferences and provide high-protein, high-caloric foods as appropriate       INTERVENTIONS:  - Monitor oral intake, urinary output, labs, and treatment plans  - Assess nutrition and hydration status and recommend course of action  - Evaluate amount of meals eaten  - Assist patient with eating if necessary   - Allow adequate time for meals  - Recommend/ encourage appropriate diets, oral nutritional supplements, and vitamin/mineral supplements  - Order, calculate, and assess calorie counts as needed  - Recommend, monitor, and adjust tube feedings and TPN/PPN based on assessed needs  - Assess need for intravenous fluids  - Provide specific nutrition/hydration education as appropriate  - Include patient/family/caregiver in decisions related to nutrition  Outcome: Progressing     Problem: Prexisting or High Potential for Compromised Skin Integrity  Goal: Skin integrity is maintained or improved  Description: INTERVENTIONS:  - Identify patients at risk for skin breakdown  - Assess and monitor skin integrity  - Assess and monitor nutrition and hydration status  - Monitor labs   - Assess for incontinence   - Turn and reposition patient  - Assist with mobility/ambulation  - Relieve pressure over bony prominences  - Avoid friction and shearing  - Provide appropriate hygiene as needed including keeping skin clean and dry  - Evaluate need for skin moisturizer/barrier cream  - Collaborate with interdisciplinary team   - Patient/family teaching  - Consider wound care consult   Outcome: Progressing

## 2023-04-21 NOTE — CASE MANAGEMENT
Case Management Discharge Planning Note    Patient name Sol Lot  Location [facility] 223/-[id number] MRN [id number]  : 1950 Date 2023       Current Admission Date: 2023  Current Admission Diagnosis:Nausea and vomiting   Patient Active Problem List    Diagnosis Date Noted   • Supratherapeutic INR 2023   • Disease of thyroid gland    • Combined systolic and diastolic congestive heart failure (HCC)    • Type 2 diabetes mellitus, without long-term current use of insulin (HCC)    • Atrial fibrillation (HCC)    • Anxiety and depression    • Nausea and vomiting    • Elevated LFTs       LOS (days): 3  Geometric Mean LOS (GMLOS) (days): 2 60  Days to GMLOS:-0 4     OBJECTIVE:  Risk of Unplanned Readmission Score: 11 56         Current admission status: Inpatient   Preferred Pharmacy:   [address] [place] [address]  Phone: [phone] Fax: [phone]    Primary Care Provider: Chris Carr MD    Primary Insurance: MEDICARE  Secondary Insurance:     DISCHARGE DETAILS:        [facility] has accepted pt at time of discharge

## 2023-04-21 NOTE — ASSESSMENT & PLAN NOTE
· Presented to the ER with elevated LFTs, also elevated at recent ER visit 4/10  · Had negative hepatitis C testing in 2019  · 4/10 today was taking a lot of Tylenol, notes during this admission that she had stopped taking it because her liver enzymes were elevated    Lab Results   Component Value Date    AST 47 (H) 04/20/2023    AST 86 (H) 04/19/2023     (H) 04/20/2023     (H) 04/19/2023     · CT abdomen pelvis done 4/10 -slight liver surface nodularity and volume redistribution which may indicate cirrhosis, small abdominal and pelvic ascites  · Had right upper quadrant abdominal ultrasound done in the ER today -hepatomegaly with lobulated hepatic contour which could indicate fibrosis/cirrhosis, possible hemangioma or focal fat on the margin of the right hepatic lobe  · Has been taking large quantities of tylenol, acetaminophen level <10  · Acute hepatitis and AFP labs normal     · Liver doppler normal  · GI: Acute mixed pattern liver enzyme elevation and is downtrending(similar presentation in 2018)  Outpatient MRI liver protocol   Can advance diet to surgical soft with low fiber  · Liver enzymes continue to improve

## 2023-04-21 NOTE — ASSESSMENT & PLAN NOTE
· Patient presents to the ER with 3-week history of nausea vomiting and weakness  Notes this started after increasing her metoprolol? She states that she did have h  Pylori in the past  No history of liver disease  · Unclear etiology  · Electrolytes on admission within normal limits-continue to monitor  · Trial of antiemetics  · IV protonix 40 mg bid  · GI consult: plan for possible no need for EGD as patient is tolerating diet with no more nausea or vomiting  May trial food for today  Follow with Dr Siria Hager outpatient  · Discontinue IVF due to CHF history, monitor fluid status and add fluids if further vomiting  · No further events of nausea or vomiting  · Surgical soft diet with low fiber    Continue to advance as tolerated

## 2023-04-21 NOTE — ASSESSMENT & PLAN NOTE
Lab Results   Component Value Date    HGBA1C 6 8 (H) 04/18/2023       Recent Labs     04/20/23  1536 04/20/23  2059 04/21/23  0734 04/21/23  1141   POCGLU 114 163* 102 101       Blood Sugar Average: Last 72 hrs:  · (P) 859 [id number]   · Currently as an outpatient seems to be on metformin, not checking her blood pressures at home per last PCP note  · Would hold metformin given nausea vomiting  · Start on correctional scale with meals, watch for hypoglycemia given poor oral intake

## 2023-04-21 NOTE — PROGRESS NOTES
[address]  Progress Note  Name: Bhavin Brown  MRN: [id number]  Unit/Bed#: -01 I Date of Admission: 4/18/2023   Date of Service: 4/21/2023 I Hospital Day: 3    Assessment/Plan   * Nausea and vomiting  Assessment & Plan  · Patient presents to the ER with 3-week history of nausea vomiting and weakness  Notes this started after increasing her metoprolol? She states that she did have h  Pylori in the past  No history of liver disease  · Unclear etiology  · Electrolytes on admission within normal limits-continue to monitor  · Trial of antiemetics  · IV protonix 40 mg bid  · GI consult: plan for possible no need for EGD as patient is tolerating diet with no more nausea or vomiting  May trial food for today  Follow with Dr Joe Luna outpatient  · Discontinue IVF due to CHF history, monitor fluid status and add fluids if further vomiting  · No further events of nausea or vomiting  · Surgical soft diet with low fiber  Continue to advance as tolerated    Supratherapeutic INR  Assessment & Plan  · Patient on coumadin for a fib  She took her last dose on Monday  Had supratherapeutic INR on 4/18  Repeat on 4/19 still elevated  · Holding coumadin at this time  · GI: Do not see a need to reverse given there is no emergent need in performing EGD  Continue to monitor  · Monitor  · Midline placed due to difficulty obtaining labs  · INR improving now that liver enzymes have stabilized   Expect to restart coumadin tomorrow pending INR level    Lab Results   Component Value Date    INR 3 79 (H) 04/21/2023    INR 5 84 (HH) 04/20/2023    INR 5 64 (HH) 04/19/2023       Elevated LFTs  Assessment & Plan  · Presented to the ER with elevated LFTs, also elevated at recent ER visit 4/10  · Had negative hepatitis C testing in 2019  · 4/10 today was taking a lot of Tylenol, notes during this admission that she had stopped taking it because her liver enzymes were elevated    Lab Results   Component Value Date    AST 47 (H) 04/20/2023    AST 86 (H) 04/19/2023     (H) 04/20/2023     (H) 04/19/2023     · CT abdomen pelvis done 4/10 -slight liver surface nodularity and volume redistribution which may indicate cirrhosis, small abdominal and pelvic ascites  · Had right upper quadrant abdominal ultrasound done in the ER today -hepatomegaly with lobulated hepatic contour which could indicate fibrosis/cirrhosis, possible hemangioma or focal fat on the margin of the right hepatic lobe  · Has been taking large quantities of tylenol, acetaminophen level <10  · Acute hepatitis and AFP labs normal     · Liver doppler normal  · GI: Acute mixed pattern liver enzyme elevation and is downtrending(similar presentation in 2018)  Outpatient MRI liver protocol  Can advance diet to surgical soft with low fiber  · Liver enzymes continue to improve     Anxiety and depression  Assessment & Plan  · Currently taking effexor 150 mg outpatient  · Continue    Atrial fibrillation (HCC)  Assessment & Plan  · Currently taking metoprolol  mg bid outpatient     · ECG in NSR  · Monitor    Type 2 diabetes mellitus, without long-term current use of insulin [facility])  Assessment & Plan  Lab Results   Component Value Date    HGBA1C 6 8 (H) 04/18/2023       Recent Labs     04/20/23  1536 04/20/23  2059 04/21/23  0734 04/21/23  1141   POCGLU 114 163* 102 101       Blood Sugar Average: Last 72 hrs:  · (P) 268 [id number]   · Currently as an outpatient seems to be on metformin, not checking her blood pressures at home per last PCP note  · Would hold metformin given nausea vomiting  · Start on correctional scale with meals, watch for hypoglycemia given poor oral intake    Combined systolic and diastolic congestive heart failure ([address] )  Assessment & Plan  Wt Readings from Last 3 Encounters:   04/21/23 123 kg (270 lb 15 1 oz)   04/10/23 122 kg (267 lb 14 4 oz)   06/07/22 131 kg (289 lb)     · Is currently on spironolactone 25 mg daily and metoprolol  mg bid  · Echo 6/7/2022 with EF 40%, left ventricular moderately dilated with systolic function normal, inferior basilar wall motion abnormality, normal diastolic function  · Euvolemic at this time, monitor fluid status with addition of fluids  Gentle IVF rehydration  · Patient still appears euvolemic but having complaints of cough  IVF discontinued  Spironolactone started again  · CXR pending  · BNP: 2553  · Monitor fluid status and switch to IV diuretics if needed  Disease of thyroid gland  Assessment & Plan  · Maintained on levothyroxine 175 mcg tablet  · Had recent TSH/T4 checked as an outpatient-TSH was low but T4 normal         VTE Pharmacologic Prophylaxis: VTE Score: 5 High Risk (Score >/= 5) - Pharmacological DVT Prophylaxis Contraindicated  Sequential Compression Devices Ordered  Patient Centered Rounds: I performed bedside rounds with nursing staff today  Discussions with Specialists or Other Care Team Provider: GI    Education and Discussions with Family / Patient: Attempted to update  (daughter) via phone  Left voicemail  Total Time Spent on Date of Encounter in care of patient: 45 minutes This time was spent on one or more of the following: performing physical exam; counseling and coordination of care; obtaining or reviewing history; documenting in the medical record; reviewing/ordering tests, medications or procedures; communicating with other healthcare professionals and discussing with patient's family/caregivers  Current Length of Stay: 3 day(s)  Current Patient Status: Inpatient   Certification Statement: The patient will continue to require additional inpatient hospital stay due to supratherapeutic INR  Discharge Plan: Anticipate discharge in 48 hrs to home  Code Status: Level 3 - DNAR and DNI    Subjective:   Patient states that she is feeling well today   States that she was nervous for the midline placement but that it was not bad and now she is feeling good  Denies chest pain or shortness of breath  Objective:     Vitals:   Temp (24hrs), Av 1 °F (36 7 °C), Min:97 9 °F (36 6 °C), Max:98 2 °F (36 8 °C)    Temp:  [97 9 °F (36 6 °C)-98 2 °F (36 8 °C)] 97 9 °F (36 6 °C)  HR:  [60-65] 61  Resp:  [19-24] 24  BP: (113-131)/(55-71) 131/71  SpO2:  [92 %-97 %] 95 %  Body mass index is 46 51 kg/m²  Input and Output Summary (last 24 hours): Intake/Output Summary (Last 24 hours) at 2023 1421  Last data filed at 2023 2100  Gross per 24 hour   Intake 240 ml   Output --   Net 240 ml       Physical Exam:   Physical Exam  Vitals reviewed  Constitutional:       General: She is not in acute distress  Appearance: Normal appearance  She is obese  She is not ill-appearing  HENT:      Head: Normocephalic and atraumatic  Nose: Nose normal       Mouth/Throat:      Mouth: Mucous membranes are moist       Pharynx: Oropharynx is clear  Eyes:      Extraocular Movements: Extraocular movements intact  Conjunctiva/sclera: Conjunctivae normal    Cardiovascular:      Rate and Rhythm: Normal rate and regular rhythm  Pulses: Normal pulses  Heart sounds: Normal heart sounds  No murmur heard  Pulmonary:      Effort: Pulmonary effort is normal  No respiratory distress  Breath sounds: Normal breath sounds  No wheezing  Abdominal:      General: Abdomen is flat  Bowel sounds are normal  There is no distension  Palpations: Abdomen is soft  Tenderness: There is no abdominal tenderness  There is no guarding  Musculoskeletal:         General: Normal range of motion  Cervical back: Normal range of motion  Right lower leg: No edema  Left lower leg: No edema  Skin:     General: Skin is warm  Neurological:      General: No focal deficit present  Mental Status: She is alert and oriented to person, place, and time  Mental status is at baseline  Motor: No weakness     Psychiatric:         Mood and Affect: Mood normal          Behavior: Behavior normal          Thought Content: Thought content normal          Judgment: Judgment normal           Additional Data:     Labs:  Results from last 7 days   Lab Units 04/20/23  0545 04/19/23  0543 04/18/23  1133   WBC Thousand/uL 7 87   < > 8 96   HEMOGLOBIN g/dL 12 0   < > 14 3   HEMATOCRIT % 39 4   < > 48 5*   PLATELETS Thousands/uL 264   < > 309   NEUTROS PCT %  --   --  73   LYMPHS PCT %  --   --  18   MONOS PCT %  --   --  7   EOS PCT %  --   --  0    < > = values in this interval not displayed       Results from last 7 days   Lab Units 04/21/23  1223   SODIUM mmol/L 136   POTASSIUM mmol/L 4 1   CHLORIDE mmol/L 100   CO2 mmol/L 28   BUN mg/dL 13   CREATININE mg/dL 0 89   ANION GAP mmol/L 8   CALCIUM mg/dL 8 8   ALBUMIN g/dL 3 8   TOTAL BILIRUBIN mg/dL 1 55*   ALK PHOS U/L 101   ALT U/L 99*   AST U/L 33   GLUCOSE RANDOM mg/dL 100     Results from last 7 days   Lab Units 04/21/23  1223   INR  3 79*     Results from last 7 days   Lab Units 04/21/23  1141 04/21/23  0734 04/20/23  2059 04/20/23  1536 04/20/23  1053 04/20/23  0714 04/19/23  2045 04/19/23  1602 04/19/23  1252 04/19/23  0543 04/18/23  2344 04/18/23  1948   POC GLUCOSE mg/dl 101 102 163* 114 157* 112 152* 128 132 103 115 192*     Results from last 7 days   Lab Units 04/18/23  1133   HEMOGLOBIN A1C % 6 8*     Results from last 7 days   Lab Units 04/19/23  0543 04/18/23  2255 04/18/23  2013 04/18/23  1836   LACTIC ACID mmol/L  --  1 7 2 1*  --    PROCALCITONIN ng/ml 0 14  --   --  0 14       Lines/Drains:  Invasive Devices     Peripheral Intravenous Line  Duration           Peripheral IV 04/18/23 Right Antecubital 3 days    Long-Dwell Peripheral IV (Midline) 04/21/23 Left <1 day                      Imaging: Reviewed radiology reports from this admission including: abdominal/pelvic CT    Recent Cultures (last 7 days):         Last 24 Hours Medication List:   Current Facility-Administered Medications Medication Dose Route Frequency Provider Last Rate   • aluminum-magnesium hydroxide-simethicone  30 mL Oral Q6H PRN Nora Lynch PA-C     • docusate sodium  100 mg Oral BID Nora Lynch PA-C     • insulin lispro  2-12 Units Subcutaneous TID AC Nora Lynch PA-C     • insulin lispro  2-12 Units Subcutaneous HS Nora Lynch PA-C     • levothyroxine  175 mcg Oral Daily Dima Otto MD     • metoprolol succinate  100 mg Oral BID Dima Otto MD     • nystatin   Topical BID Nora Lynch PA-C     • ondansetron  4 mg Intravenous Q6H PRN Nora Lynch PA-C     • pantoprazole  40 mg Intravenous Q12H [address] Aleidatay Quigley PA-C     • polyethylene glycol  17 g Oral Daily Jo Quigley PA-C     • spironolactone  25 mg Oral Daily Jo Quigley PA-C     • venlafaxine  150 mg Oral Daily Nora Lynch PA-C          Today, Patient Was Seen By: Lyla Abbott PA-C    **Please Note: This note may have been constructed using a voice recognition system  **

## 2023-04-21 NOTE — ASSESSMENT & PLAN NOTE
Wt Readings from Last 3 Encounters:   04/21/23 123 kg (270 lb 15 1 oz)   04/10/23 122 kg (267 lb 14 4 oz)   06/07/22 131 kg (289 lb)     · Is currently on spironolactone 25 mg daily and metoprolol  mg bid  · Echo 6/7/2022 with EF 40%, left ventricular moderately dilated with systolic function normal, inferior basilar wall motion abnormality, normal diastolic function  · Euvolemic at this time, monitor fluid status with addition of fluids  Gentle IVF rehydration  · Patient still appears euvolemic but having complaints of cough  IVF discontinued  Spironolactone started again  · CXR pending  · BNP: 2553  · Monitor fluid status and switch to IV diuretics if needed

## 2023-04-21 NOTE — PROCEDURES
Midline Insertion    Date/Time: 4/21/2023 12:28 PM  Performed by: Cheli Gan PA-C  Authorized by: Cheli Gan PA-C     Patient location:  Bedside  Consent:     Consent obtained:  Verbal    Consent given by:  Patient    Risks discussed:  Arterial puncture, bleeding, infection and incorrect placement    Alternatives discussed:  Alternative treatment  Universal protocol:     Procedure explained and questions answered to patient or proxy's satisfaction: yes      Relevant documents present and verified: yes      Site/side marked: yes      Immediately prior to procedure, a time out was called: yes      Patient identity confirmed:  Arm band, hospital-assigned identification number and provided demographic data  Pre-procedure details:     Hand hygiene: Hand hygiene performed prior to insertion      Sterile barrier technique: All elements of maximal sterile technique followed      Skin preparation:  2% chlorhexidine    Skin preparation agent: Skin preparation agent completely dried prior to procedure    Indications:     Midline indications: new site    Anesthesia (see MAR for exact dosages): Anesthesia method:  None  Procedure details:     Location:  Left basilic    Laterality:  Left    Catheter size:  20 gauge    Landmarks identified: yes      Ultrasound guidance: yes      Ultrasound image availability:  Not saved    Sterile ultrasound techniques: Sterile gel and sterile probe covers were used      Number of attempts:  1    Successful placement: yes      Vessel of catheter tip end:  Sherlock 3CG confirmed    Catheter length (cm):  10    Exposed catheter length (cm):  0  Post-procedure details:     Post-procedure:  Securement device placed    Assessment:  Blood return through all ports and free fluid flow    Post-procedure complications: none      Patient tolerance of procedure:   Tolerated well, no immediate complications

## 2023-04-21 NOTE — PROGRESS NOTES
-- Patient: Bety Zhang  -- MRN: [id number]  -- Aidin Request ID: [id number]  -- Level of care reserved: [address] [place] [address]  -- Partner Reserved: [address] [place] [address], [facility] [phone]  -- Clinical needs requested:  -- Geography searched: [phone]  -- Start of Service:  -- Request sent: 8:00am EDT on 4/21/2023 by Kirby Ellis  -- Partner reserved: 3:56pm EDT on 4/21/2023 by Kirby Ellis  -- Choice list shared:

## 2023-04-22 LAB
GLUCOSE SERPL-MCNC: 100 MG/DL (ref 65–140)
GLUCOSE SERPL-MCNC: 102 MG/DL (ref 65–140)
GLUCOSE SERPL-MCNC: 104 MG/DL (ref 65–140)
GLUCOSE SERPL-MCNC: 159 MG/DL (ref 65–140)
INR PPP: 4.28 (ref 0.84–1.19)
PROTHROMBIN TIME: 41.1 SECONDS (ref 11.6–14.5)

## 2023-04-22 RX ADMIN — PANTOPRAZOLE SODIUM 40 MG: 40 INJECTION, POWDER, FOR SOLUTION INTRAVENOUS at 08:32

## 2023-04-22 RX ADMIN — NYSTATIN: 100000 POWDER TOPICAL at 17:03

## 2023-04-22 RX ADMIN — ONDANSETRON 4 MG: 2 INJECTION INTRAMUSCULAR; INTRAVENOUS at 14:12

## 2023-04-22 RX ADMIN — INSULIN LISPRO 2 UNITS: 100 INJECTION, SOLUTION INTRAVENOUS; SUBCUTANEOUS at 12:52

## 2023-04-22 RX ADMIN — SPIRONOLACTONE 25 MG: 25 TABLET ORAL at 08:32

## 2023-04-22 RX ADMIN — METOPROLOL SUCCINATE 100 MG: 100 TABLET, EXTENDED RELEASE ORAL at 08:32

## 2023-04-22 RX ADMIN — ONDANSETRON 4 MG: 2 INJECTION INTRAMUSCULAR; INTRAVENOUS at 08:32

## 2023-04-22 RX ADMIN — METOPROLOL SUCCINATE 100 MG: 100 TABLET, EXTENDED RELEASE ORAL at 21:37

## 2023-04-22 RX ADMIN — VENLAFAXINE HYDROCHLORIDE 150 MG: 150 CAPSULE, EXTENDED RELEASE ORAL at 08:32

## 2023-04-22 RX ADMIN — LEVOTHYROXINE SODIUM 175 MCG: 175 TABLET ORAL at 05:39

## 2023-04-22 RX ADMIN — NYSTATIN: 100000 POWDER TOPICAL at 08:33

## 2023-04-22 RX ADMIN — PANTOPRAZOLE SODIUM 40 MG: 40 INJECTION, POWDER, FOR SOLUTION INTRAVENOUS at 21:37

## 2023-04-22 NOTE — PROGRESS NOTES
[address] [facility]  Progress Note  Name: Axel Barrow  MRN: [id number]  Unit/Bed#: -01 I Date of Admission: 4/18/2023   Date of Service: 4/22/2023 I Hospital Day: 4    Assessment/Plan   * Nausea and vomiting  Assessment & Plan  · Patient presents to the ER with 3-week history of nausea vomiting and weakness  Notes this started after increasing her metoprolol? She states that she did have h  Pylori in the past  No history of liver disease  · Unclear etiology  · Electrolytes on admission within normal limits-continue to monitor  · Trial of antiemetics  · IV protonix 40 mg bid  · GI consult: plan for possible no need for EGD as patient is tolerating diet with no more nausea or vomiting  May trial food for today  Follow with Dr Cecilia Luis outpatient  · Discontinue IVF due to CHF history, monitor fluid status and add fluids if further vomiting  · No further events of nausea or vomiting  · Surgical soft diet with low fiber  Continue to advance as tolerated    Supratherapeutic INR  Assessment & Plan  · Patient on coumadin for a fib  She took her last dose on Monday  Had supratherapeutic INR on 4/18  Repeat on 4/19 still elevated  · Holding coumadin at this time  · GI: Do not see a need to reverse given there is no emergent need in performing EGD   Continue to monitor  · Monitor with morning INR labs  · Midline placed due to difficulty obtaining labs    Lab Results   Component Value Date    INR 4 28 (H) 04/22/2023    INR 3 79 (H) 04/21/2023    INR 5 84 (HH) 04/20/2023       Elevated LFTs  Assessment & Plan  · Presented to the ER with elevated LFTs, also elevated at recent ER visit 4/10  · Had negative hepatitis C testing in 2019  · 4/10 today was taking a lot of Tylenol, notes during this admission that she had stopped taking it because her liver enzymes were elevated    Lab Results   Component Value Date    AST 33 04/21/2023    AST 47 (H) 04/20/2023    ALT 99 (H) 04/21/2023     (H) 04/20/2023     · CT abdomen pelvis done 4/10 -slight liver surface nodularity and volume redistribution which may indicate cirrhosis, small abdominal and pelvic ascites  · Had right upper quadrant abdominal ultrasound done in the ER today -hepatomegaly with lobulated hepatic contour which could indicate fibrosis/cirrhosis, possible hemangioma or focal fat on the margin of the right hepatic lobe  · Has been taking large quantities of tylenol, acetaminophen level <10  · Acute hepatitis and AFP labs normal     · Liver doppler normal  · GI: Acute mixed pattern liver enzyme elevation and is downtrending(similar presentation in 2018)  Outpatient MRI liver protocol  Can advance diet to surgical soft with low fiber  · Liver enzymes continue to improve     Anxiety and depression  Assessment & Plan  · Currently taking effexor 150 mg outpatient  · Continue    Atrial fibrillation (HCC)  Assessment & Plan  · Currently taking metoprolol  mg bid outpatient     · ECG in NSR  · Monitor    Type 2 diabetes mellitus, without long-term current use of insulin [facility])  Assessment & Plan  Lab Results   Component Value Date    HGBA1C 6 8 (H) 04/18/2023       Recent Labs     04/21/23  1545 04/21/23  2052 04/22/23  0754 04/22/23  1118   POCGLU 156* 146* 104 159*       Blood Sugar Average: Last 72 hrs:  · (P) 130 [id number]   · Currently as an outpatient seems to be on metformin, not checking her blood pressures at home per last PCP note  · Would hold metformin given nausea vomiting  · Start on correctional scale with meals, watch for hypoglycemia given poor oral intake    Combined systolic and diastolic congestive heart failure ([address] )  Assessment & Plan  Wt Readings from Last 3 Encounters:   04/22/23 124 kg (272 lb 11 3 oz)   04/10/23 122 kg (267 lb 14 4 oz)   06/07/22 131 kg (289 lb)     · Is currently on spironolactone 25 mg daily and metoprolol  mg bid  · Echo 6/7/2022 with EF 40%, left ventricular moderately dilated with systolic function normal, inferior basilar wall motion abnormality, normal diastolic function  · Euvolemic at this time, monitor fluid status with addition of fluids  Gentle IVF rehydration  · Patient still appears euvolemic but having complaints of cough  IVF discontinued  Spironolactone started again  · CXR showing cardiomegaly with mild pulmonary congestion  · BNP: 2553  · Monitor fluid status and switch to IV diuretics if needed  · Has had improvement since restarting spironolactone    Disease of thyroid gland  Assessment & Plan  · Maintained on levothyroxine 175 mcg tablet  · Had recent TSH/T4 checked as an outpatient-TSH was low but T4 normal           VTE Pharmacologic Prophylaxis: VTE Score: 5 High Risk (Score >/= 5) - Pharmacological DVT Prophylaxis Contraindicated  Sequential Compression Devices Ordered  Patient Centered Rounds: I performed bedside rounds with nursing staff today  Discussions with Specialists or Other Care Team Provider: None    Education and Discussions with Family / Patient: Attempted to update  (daughter) via phone  Left voicemail  Total Time Spent on Date of Encounter in care of patient: 45 minutes This time was spent on one or more of the following: performing physical exam; counseling and coordination of care; obtaining or reviewing history; documenting in the medical record; reviewing/ordering tests, medications or procedures; communicating with other healthcare professionals and discussing with patient's family/caregivers  Current Length of Stay: 4 day(s)  Current Patient Status: Inpatient   Certification Statement: The patient will continue to require additional inpatient hospital stay due to Supratherapeutic INR  Discharge Plan: Anticipate discharge in 48 hrs to home with home services  Code Status: Level 3 - DNAR and DNI    Subjective:   Patient states that she is feeling good today    States she had a little bit of nausea with breakfast, but it has since subsided  Denies any more vomiting  Denies chest pain or shortness of breath  Objective:     Vitals:   Temp (24hrs), Av 5 °F (36 4 °C), Min:97 5 °F (36 4 °C), Max:97 5 °F (36 4 °C)    Temp:  [97 5 °F (36 4 °C)] 97 5 °F (36 4 °C)  HR:  [62-69] 62  Resp:  [19-20] 19  BP: ()/(58-83) 127/68  SpO2:  [94 %-98 %] 98 %  Body mass index is 46 81 kg/m²  Input and Output Summary (last 24 hours): Intake/Output Summary (Last 24 hours) at 2023 1153  Last data filed at 2023 1700  Gross per 24 hour   Intake 240 ml   Output --   Net 240 ml       Physical Exam:   Physical Exam  Vitals reviewed  Constitutional:       General: She is not in acute distress  Appearance: Normal appearance  She is obese  She is not ill-appearing  HENT:      Head: Normocephalic and atraumatic  Nose: Nose normal       Mouth/Throat:      Mouth: Mucous membranes are moist       Pharynx: Oropharynx is clear  Eyes:      Extraocular Movements: Extraocular movements intact  Conjunctiva/sclera: Conjunctivae normal    Cardiovascular:      Rate and Rhythm: Normal rate and regular rhythm  Pulses: Normal pulses  Heart sounds: Normal heart sounds  No murmur heard  Pulmonary:      Effort: Pulmonary effort is normal  No respiratory distress  Breath sounds: Normal breath sounds  No wheezing  Abdominal:      General: Abdomen is flat  Bowel sounds are normal  There is no distension  Palpations: Abdomen is soft  Tenderness: There is no abdominal tenderness  There is no guarding  Musculoskeletal:         General: Normal range of motion  Cervical back: Normal range of motion  Right lower leg: No edema  Left lower leg: No edema  Skin:     General: Skin is warm  Neurological:      General: No focal deficit present  Mental Status: She is alert and oriented to person, place, and time  Mental status is at baseline  Motor: No weakness     Psychiatric: Mood and Affect: Mood normal          Behavior: Behavior normal          Thought Content: Thought content normal          Judgment: Judgment normal           Additional Data:     Labs:  Results from last 7 days   Lab Units 04/20/23  0545 04/19/23  0543 04/18/23  1133   WBC Thousand/uL 7 87   < > 8 96   HEMOGLOBIN g/dL 12 0   < > 14 3   HEMATOCRIT % 39 4   < > 48 5*   PLATELETS Thousands/uL 264   < > 309   NEUTROS PCT %  --   --  73   LYMPHS PCT %  --   --  18   MONOS PCT %  --   --  7   EOS PCT %  --   --  0    < > = values in this interval not displayed       Results from last 7 days   Lab Units 04/21/23  1223   SODIUM mmol/L 136   POTASSIUM mmol/L 4 1   CHLORIDE mmol/L 100   CO2 mmol/L 28   BUN mg/dL 13   CREATININE mg/dL 0 89   ANION GAP mmol/L 8   CALCIUM mg/dL 8 8   ALBUMIN g/dL 3 8   TOTAL BILIRUBIN mg/dL 1 55*   ALK PHOS U/L 101   ALT U/L 99*   AST U/L 33   GLUCOSE RANDOM mg/dL 100     Results from last 7 days   Lab Units 04/22/23  0441   INR  4 28*     Results from last 7 days   Lab Units 04/22/23  1118 04/22/23  0754 04/21/23  2052 04/21/23  1545 04/21/23  1141 04/21/23  0734 04/20/23  2059 04/20/23  1536 04/20/23  1053 04/20/23  0714 04/19/23  2045 04/19/23  1602   POC GLUCOSE mg/dl 159* 104 146* 156* 101 102 163* 114 157* 112 152* 128     Results from last 7 days   Lab Units 04/18/23  1133   HEMOGLOBIN A1C % 6 8*     Results from last 7 days   Lab Units 04/19/23  0543 04/18/23  2255 04/18/23  2013 04/18/23  1836   LACTIC ACID mmol/L  --  1 7 2 1*  --    PROCALCITONIN ng/ml 0 14  --   --  0 14       Lines/Drains:  Invasive Devices     Peripheral Intravenous Line  Duration           Long-Dwell Peripheral IV (Midline) 04/21/23 Left <1 day                      Imaging: Reviewed radiology reports from this admission including: chest xray    Recent Cultures (last 7 days):         Last 24 Hours Medication List:   Current Facility-Administered Medications   Medication Dose Route Frequency Provider Last Rate   • aluminum-magnesium hydroxide-simethicone  30 mL Oral Q6H PRN Lc Blanco PA-C     • docusate sodium  100 mg Oral BID Lc Blanco PA-C     • insulin lispro  2-12 Units Subcutaneous TID AC Lc Blanco PA-C     • insulin lispro  2-12 Units Subcutaneous HS Lc Blanco PA-C     • levothyroxine  175 mcg Oral Daily Dougie Luna MD     • metoprolol succinate  100 mg Oral BID Dougie Luna MD     • nystatin   Topical BID Lc Blanco PA-C     • ondansetron  4 mg Intravenous Q6H PRN Lc Blanco PA-C     • pantoprazole  40 mg Intravenous Q12H [facility] Aleida Quigley PA-C     • polyethylene glycol  17 g Oral Daily Denzel Quigley PA-C     • spironolactone  25 mg Oral Daily Denzel Quigley PA-C     • venlafaxine  150 mg Oral Daily Lc Blanco PA-C          Today, Patient Was Seen By: Fernando Cardenas PA-C    **Please Note: This note may have been constructed using a voice recognition system  **

## 2023-04-22 NOTE — PLAN OF CARE
Problem: Potential for Falls  Goal: Patient will remain free of falls  Description: INTERVENTIONS:  - Educate patient/family on patient safety including physical limitations  - Instruct patient to call for assistance with activity   - Consult OT/PT to assist with strengthening/mobility   - Keep Call bell within reach  - Keep bed low and locked with side rails adjusted as appropriate  - Keep care items and personal belongings within reach  - Initiate and maintain comfort rounds  - Make Fall Risk Sign visible to staff  - Offer Toileting every 2 Hours, in advance of need  - Initiate/Maintain bed/chair alarm  - Apply yellow socks and bracelet for high fall risk patients  - Consider moving patient to room near nurses station  Outcome: Progressing     Problem: PAIN - ADULT  Goal: Verbalizes/displays adequate comfort level or baseline comfort level  Description: Interventions:  - Encourage patient to monitor pain and request assistance  - Assess pain using appropriate pain scale  - Administer analgesics based on type and severity of pain and evaluate response  - Implement non-pharmacological measures as appropriate and evaluate response  - Consider cultural and social influences on pain and pain management  - Notify physician/advanced practitioner if interventions unsuccessful or patient reports new pain  Outcome: Progressing     Problem: SAFETY ADULT  Goal: Patient will remain free of falls  Description: INTERVENTIONS:  - Educate patient/family on patient safety including physical limitations  - Instruct patient to call for assistance with activity   - Consult OT/PT to assist with strengthening/mobility   - Keep Call bell within reach  - Keep bed low and locked with side rails adjusted as appropriate  - Keep care items and personal belongings within reach  - Initiate and maintain comfort rounds  - Make Fall Risk Sign visible to staff  - Apply yellow socks and bracelet for high fall risk patients  - Consider moving patient to room near nurses station  Outcome: Progressing     Problem: DISCHARGE PLANNING  Goal: Discharge to home or other facility with appropriate resources  Description: INTERVENTIONS:  - Identify barriers to discharge w/patient and caregiver  - Arrange for needed discharge resources and transportation as appropriate  - Identify discharge learning needs (meds, wound care, etc )  - Arrange for interpretive services to assist at discharge as needed  - Refer to Case Management Department for coordinating discharge planning if the patient needs post-hospital services based on physician/advanced practitioner order or complex needs related to functional status, cognitive ability, or social support system  Outcome: Progressing

## 2023-04-22 NOTE — ASSESSMENT & PLAN NOTE
· Patient presents to the ER with 3-week history of nausea vomiting and weakness  Notes this started after increasing her metoprolol? She states that she did have h  Pylori in the past  No history of liver disease  · Unclear etiology  · Electrolytes on admission within normal limits-continue to monitor  · Trial of antiemetics  · IV protonix 40 mg bid  · GI consult: plan for possible no need for EGD as patient is tolerating diet with no more nausea or vomiting  May trial food for today  Follow with Dr Kate Mckay outpatient  · Discontinue IVF due to CHF history, monitor fluid status and add fluids if further vomiting  · No further events of nausea or vomiting  · Surgical soft diet with low fiber    Continue to advance as tolerated

## 2023-04-22 NOTE — NURSING NOTE
Assumed care of patient, report received from [facility]; agree with previous nurse assessment

## 2023-04-22 NOTE — ASSESSMENT & PLAN NOTE
Wt Readings from Last 3 Encounters:   04/22/23 124 kg (272 lb 11 3 oz)   04/10/23 122 kg (267 lb 14 4 oz)   06/07/22 131 kg (289 lb)     · Is currently on spironolactone 25 mg daily and metoprolol  mg bid  · Echo 6/7/2022 with EF 40%, left ventricular moderately dilated with systolic function normal, inferior basilar wall motion abnormality, normal diastolic function  · Euvolemic at this time, monitor fluid status with addition of fluids  Gentle IVF rehydration  · Patient still appears euvolemic but having complaints of cough  IVF discontinued  Spironolactone started again  · CXR showing cardiomegaly with mild pulmonary congestion  · BNP: 2553  · Monitor fluid status and switch to IV diuretics if needed     · Has had improvement since restarting spironolactone

## 2023-04-22 NOTE — ASSESSMENT & PLAN NOTE
· Presented to the ER with elevated LFTs, also elevated at recent ER visit 4/10  · Had negative hepatitis C testing in 2019  · 4/10 today was taking a lot of Tylenol, notes during this admission that she had stopped taking it because her liver enzymes were elevated    Lab Results   Component Value Date    AST 33 04/21/2023    AST 47 (H) 04/20/2023    ALT 99 (H) 04/21/2023     (H) 04/20/2023     · CT abdomen pelvis done 4/10 -slight liver surface nodularity and volume redistribution which may indicate cirrhosis, small abdominal and pelvic ascites  · Had right upper quadrant abdominal ultrasound done in the ER today -hepatomegaly with lobulated hepatic contour which could indicate fibrosis/cirrhosis, possible hemangioma or focal fat on the margin of the right hepatic lobe  · Has been taking large quantities of tylenol, acetaminophen level <10  · Acute hepatitis and AFP labs normal     · Liver doppler normal  · GI: Acute mixed pattern liver enzyme elevation and is downtrending(similar presentation in 2018)  Outpatient MRI liver protocol   Can advance diet to surgical soft with low fiber  · Liver enzymes continue to improve

## 2023-04-22 NOTE — ASSESSMENT & PLAN NOTE
· Patient on coumadin for a fib  She took her last dose on Monday  Had supratherapeutic INR on 4/18  Repeat on 4/19 still elevated  · Holding coumadin at this time  · GI: Do not see a need to reverse given there is no emergent need in performing EGD   Continue to monitor  · Monitor with morning INR labs  · Midline placed due to difficulty obtaining labs    Lab Results   Component Value Date    INR 4 28 (H) 04/22/2023    INR 3 79 (H) 04/21/2023    INR 5 84 (HH) 04/20/2023

## 2023-04-22 NOTE — ASSESSMENT & PLAN NOTE
Lab Results   Component Value Date    HGBA1C 6 8 (H) 04/18/2023       Recent Labs     04/21/23  1545 04/21/23  2052 04/22/23  0754 04/22/23  1118   POCGLU 156* 146* 104 159*       Blood Sugar Average: Last 72 hrs:  · (P) 130 [id number]   · Currently as an outpatient seems to be on metformin, not checking her blood pressures at home per last PCP note  · Would hold metformin given nausea vomiting  · Start on correctional scale with meals, watch for hypoglycemia given poor oral intake

## 2023-04-22 NOTE — PLAN OF CARE
Problem: Potential for Falls  Goal: Patient will remain free of falls  Description: INTERVENTIONS:  - Educate patient/family on patient safety including physical limitations  - Instruct patient to call for assistance with activity   - Consult OT/PT to assist with strengthening/mobility   - Keep Call bell within reach  - Keep bed low and locked with side rails adjusted as appropriate  - Keep care items and personal belongings within reach  - Initiate and maintain comfort rounds  - Make Fall Risk Sign visible to staff  - Offer Toileting every 2 Hours, in advance of need  - Initiate/Maintain bed/chair alarm  - Obtain necessary fall risk management equipment: walker   - Apply yellow socks and bracelet for high fall risk patients  - Consider moving patient to room near nurses station  Outcome: Progressing     Problem: MOBILITY - ADULT  Goal: Maintain or return to baseline ADL function  Description: INTERVENTIONS:  -  Assess patient's ability to carry out ADLs; assess patient's baseline for ADL function and identify physical deficits which impact ability to perform ADLs (bathing, care of mouth/teeth, toileting, grooming, dressing, etc )  - Assess/evaluate cause of self-care deficits   - Assess range of motion  - Assess patient's mobility; develop plan if impaired  - Assess patient's need for assistive devices and provide as appropriate  - Encourage maximum independence but intervene and supervise when necessary  - Involve family in performance of ADLs  - Assess for home care needs following discharge   - Consider OT consult to assist with ADL evaluation and planning for discharge  - Provide patient education as appropriate  Outcome: Progressing  Goal: Maintains/Returns to pre admission functional level  Description: INTERVENTIONS:  - Perform BMAT or MOVE assessment daily    - Set and communicate daily mobility goal to care team and patient/family/caregiver     - Collaborate with rehabilitation services on mobility goals if consulted  - Perform Range of Motion 3 times a day  - Reposition patient every 2 hours    - Dangle patient 3 times a day  - Stand patient 3 times a day  - Ambulate patient 3 times a day  - Out of bed to chair 3 times a day   - Out of bed for meals 3 times a day  - Out of bed for toileting  - Record patient progress and toleration of activity level   Outcome: Progressing     Problem: PAIN - ADULT  Goal: Verbalizes/displays adequate comfort level or baseline comfort level  Description: Interventions:  - Encourage patient to monitor pain and request assistance  - Assess pain using appropriate pain scale  - Administer analgesics based on type and severity of pain and evaluate response  - Implement non-pharmacological measures as appropriate and evaluate response  - Consider cultural and social influences on pain and pain management  - Notify physician/advanced practitioner if interventions unsuccessful or patient reports new pain  Outcome: Progressing     Problem: INFECTION - ADULT  Goal: Absence or prevention of progression during hospitalization  Description: INTERVENTIONS:  - Assess and monitor for signs and symptoms of infection  - Monitor lab/diagnostic results  - Monitor all insertion sites, i e  indwelling lines, tubes, and drains  - Monitor endotracheal if appropriate and nasal secretions for changes in amount and color  - [facility] appropriate cooling/warming therapies per order  - Administer medications as ordered  - Instruct and encourage patient and family to use good hand hygiene technique  - Identify and instruct in appropriate isolation precautions for identified infection/condition  Outcome: Progressing  Goal: Absence of fever/infection during neutropenic period  Description: INTERVENTIONS:  - Monitor WBC    Outcome: Progressing     Problem: SAFETY ADULT  Goal: Patient will remain free of falls  Description: INTERVENTIONS:  - Educate patient/family on patient safety including physical limitations  - Instruct patient to call for assistance with activity   - Consult OT/PT to assist with strengthening/mobility   - Keep Call bell within reach  - Keep bed low and locked with side rails adjusted as appropriate  - Keep care items and personal belongings within reach  - Initiate and maintain comfort rounds  - Make Fall Risk Sign visible to staff  - Offer Toileting every 2 Hours, in advance of need  - Initiate/Maintain bed/chair alarm  - Obtain necessary fall risk management equipment: walker   - Apply yellow socks and bracelet for high fall risk patients  - Consider moving patient to room near nurses station  Outcome: Progressing  Goal: Maintain or return to baseline ADL function  Description: INTERVENTIONS:  -  Assess patient's ability to carry out ADLs; assess patient's baseline for ADL function and identify physical deficits which impact ability to perform ADLs (bathing, care of mouth/teeth, toileting, grooming, dressing, etc )  - Assess/evaluate cause of self-care deficits   - Assess range of motion  - Assess patient's mobility; develop plan if impaired  - Assess patient's need for assistive devices and provide as appropriate  - Encourage maximum independence but intervene and supervise when necessary  - Involve family in performance of ADLs  - Assess for home care needs following discharge   - Consider OT consult to assist with ADL evaluation and planning for discharge  - Provide patient education as appropriate  Outcome: Progressing  Goal: Maintains/Returns to pre admission functional level  Description: INTERVENTIONS:  - Perform BMAT or MOVE assessment daily    - Set and communicate daily mobility goal to care team and patient/family/caregiver  - Collaborate with rehabilitation services on mobility goals if consulted  - Perform Range of Motion 3 times a day  - Reposition patient every 2 hours    - Dangle patient 3 times a day  - Stand patient 3 times a day  - Ambulate patient 3 times a day  - Out of bed to chair 3 times a day   - Out of bed for meals 3 times a day  - Out of bed for toileting  - Record patient progress and toleration of activity level   Outcome: Progressing     Problem: DISCHARGE PLANNING  Goal: Discharge to home or other facility with appropriate resources  Description: INTERVENTIONS:  - Identify barriers to discharge w/patient and caregiver  - Arrange for needed discharge resources and transportation as appropriate  - Identify discharge learning needs (meds, wound care, etc )  - Arrange for interpretive services to assist at discharge as needed  - Refer to Case Management Department for coordinating discharge planning if the patient needs post-hospital services based on physician/advanced practitioner order or complex needs related to functional status, cognitive ability, or social support system  Outcome: Progressing     Problem: Knowledge Deficit  Goal: Patient/family/caregiver demonstrates understanding of disease process, treatment plan, medications, and discharge instructions  Description: Complete learning assessment and assess knowledge base  Interventions:  - Provide teaching at level of understanding  - Provide teaching via preferred learning methods  Outcome: Progressing     Problem: Nutrition/Hydration-ADULT  Goal: Nutrient/Hydration intake appropriate for improving, restoring or maintaining nutritional needs  Description: Monitor and assess patient's nutrition/hydration status for malnutrition  Collaborate with interdisciplinary team and initiate plan and interventions as ordered  Monitor patient's weight and dietary intake as ordered or per policy  Utilize nutrition screening tool and intervene as necessary  Determine patient's food preferences and provide high-protein, high-caloric foods as appropriate       INTERVENTIONS:  - Monitor oral intake, urinary output, labs, and treatment plans  - Assess nutrition and hydration status and recommend course of action  - Evaluate amount of meals eaten  - Assist patient with eating if necessary   - Allow adequate time for meals  - Recommend/ encourage appropriate diets, oral nutritional supplements, and vitamin/mineral supplements  - Order, calculate, and assess calorie counts as needed  - Recommend, monitor, and adjust tube feedings and TPN/PPN based on assessed needs  - Assess need for intravenous fluids  - Provide specific nutrition/hydration education as appropriate  - Include patient/family/caregiver in decisions related to nutrition  Outcome: Progressing

## 2023-04-23 LAB
ALBUMIN SERPL BCP-MCNC: 3.7 G/DL (ref 3.5–5)
ALP SERPL-CCNC: 124 U/L (ref 34–104)
ALT SERPL W P-5'-P-CCNC: 254 U/L (ref 7–52)
ANION GAP SERPL CALCULATED.3IONS-SCNC: 9 MMOL/L (ref 4–13)
AST SERPL W P-5'-P-CCNC: 230 U/L (ref 13–39)
BILIRUB DIRECT SERPL-MCNC: 0.83 MG/DL (ref 0–0.2)
BILIRUB SERPL-MCNC: 2.06 MG/DL (ref 0.2–1)
BUN SERPL-MCNC: 19 MG/DL (ref 5–25)
CALCIUM SERPL-MCNC: 9 MG/DL (ref 8.4–10.2)
CHLORIDE SERPL-SCNC: 98 MMOL/L (ref 96–108)
CK SERPL-CCNC: 32 U/L (ref 26–192)
CO2 SERPL-SCNC: 26 MMOL/L (ref 21–32)
CREAT SERPL-MCNC: 1.02 MG/DL (ref 0.6–1.3)
ERYTHROCYTE [DISTWIDTH] IN BLOOD BY AUTOMATED COUNT: 18.4 % (ref 11.6–15.1)
GFR SERPL CREATININE-BSD FRML MDRD: 55 ML/MIN/1.73SQ M
GLUCOSE SERPL-MCNC: 123 MG/DL (ref 65–140)
GLUCOSE SERPL-MCNC: 150 MG/DL (ref 65–140)
GLUCOSE SERPL-MCNC: 161 MG/DL (ref 65–140)
GLUCOSE SERPL-MCNC: 164 MG/DL (ref 65–140)
GLUCOSE SERPL-MCNC: 93 MG/DL (ref 65–140)
HCT VFR BLD AUTO: 41.9 % (ref 34.8–46.1)
HGB BLD-MCNC: 12.7 G/DL (ref 11.5–15.4)
INR PPP: 4.53 (ref 0.84–1.19)
MCH RBC QN AUTO: 25.5 PG (ref 26.8–34.3)
MCHC RBC AUTO-ENTMCNC: 30.3 G/DL (ref 31.4–37.4)
MCV RBC AUTO: 84 FL (ref 82–98)
PLATELET # BLD AUTO: 302 THOUSANDS/UL (ref 149–390)
PMV BLD AUTO: 9.7 FL (ref 8.9–12.7)
POTASSIUM SERPL-SCNC: 4.8 MMOL/L (ref 3.5–5.3)
PROT SERPL-MCNC: 6.3 G/DL (ref 6.4–8.4)
PROTHROMBIN TIME: 42.9 SECONDS (ref 11.6–14.5)
RBC # BLD AUTO: 4.98 MILLION/UL (ref 3.81–5.12)
SARS-COV-2 RNA RESP QL NAA+PROBE: NEGATIVE
SODIUM SERPL-SCNC: 133 MMOL/L (ref 135–147)
WBC # BLD AUTO: 7.82 THOUSAND/UL (ref 4.31–10.16)

## 2023-04-23 RX ORDER — FUROSEMIDE 10 MG/ML
40 INJECTION INTRAMUSCULAR; INTRAVENOUS ONCE
Status: COMPLETED | OUTPATIENT
Start: 2023-04-23 | End: 2023-04-23

## 2023-04-23 RX ADMIN — NYSTATIN: 100000 POWDER TOPICAL at 09:57

## 2023-04-23 RX ADMIN — PANTOPRAZOLE SODIUM 40 MG: 40 INJECTION, POWDER, FOR SOLUTION INTRAVENOUS at 09:57

## 2023-04-23 RX ADMIN — METOPROLOL SUCCINATE 100 MG: 100 TABLET, EXTENDED RELEASE ORAL at 09:57

## 2023-04-23 RX ADMIN — NYSTATIN: 100000 POWDER TOPICAL at 16:17

## 2023-04-23 RX ADMIN — VENLAFAXINE HYDROCHLORIDE 150 MG: 150 CAPSULE, EXTENDED RELEASE ORAL at 09:57

## 2023-04-23 RX ADMIN — FUROSEMIDE 40 MG: 10 INJECTION, SOLUTION INTRAVENOUS at 13:47

## 2023-04-23 RX ADMIN — SPIRONOLACTONE 25 MG: 25 TABLET ORAL at 09:57

## 2023-04-23 RX ADMIN — INSULIN LISPRO 2 UNITS: 100 INJECTION, SOLUTION INTRAVENOUS; SUBCUTANEOUS at 16:16

## 2023-04-23 RX ADMIN — LEVOTHYROXINE SODIUM 175 MCG: 175 TABLET ORAL at 05:58

## 2023-04-23 RX ADMIN — PANTOPRAZOLE SODIUM 40 MG: 40 INJECTION, POWDER, FOR SOLUTION INTRAVENOUS at 21:25

## 2023-04-23 RX ADMIN — INSULIN LISPRO 2 UNITS: 100 INJECTION, SOLUTION INTRAVENOUS; SUBCUTANEOUS at 12:10

## 2023-04-23 RX ADMIN — METOPROLOL SUCCINATE 100 MG: 100 TABLET, EXTENDED RELEASE ORAL at 21:25

## 2023-04-23 NOTE — PLAN OF CARE
Problem: Potential for Falls  Goal: Patient will remain free of falls  Description: INTERVENTIONS:  - Educate patient/family on patient safety including physical limitations  - Instruct patient to call for assistance with activity   - Consult OT/PT to assist with strengthening/mobility   - Keep Call bell within reach  - Keep bed low and locked with side rails adjusted as appropriate  - Keep care items and personal belongings within reach  - Initiate and maintain comfort rounds  - Make Fall Risk Sign visible to staff  - Offer Toileting every 2 Hours, in advance of need  - Initiate/Maintain bed/chair alarm  - Obtain necessary fall risk management equipment: walker   - Apply yellow socks and bracelet for high fall risk patients  - Consider moving patient to room near nurses station  Outcome: Progressing     Problem: PAIN - ADULT  Goal: Verbalizes/displays adequate comfort level or baseline comfort level  Description: Interventions:  - Encourage patient to monitor pain and request assistance  - Assess pain using appropriate pain scale  - Administer analgesics based on type and severity of pain and evaluate response  - Implement non-pharmacological measures as appropriate and evaluate response  - Consider cultural and social influences on pain and pain management  - Notify physician/advanced practitioner if interventions unsuccessful or patient reports new pain  Outcome: Progressing     Problem: SAFETY ADULT  Goal: Patient will remain free of falls  Description: INTERVENTIONS:  - Educate patient/family on patient safety including physical limitations  - Instruct patient to call for assistance with activity   - Consult OT/PT to assist with strengthening/mobility   - Keep Call bell within reach  - Keep bed low and locked with side rails adjusted as appropriate  - Keep care items and personal belongings within reach  - Initiate and maintain comfort rounds  - Make Fall Risk Sign visible to staff  - Offer Toileting every 2 Hours, in advance of need  - Initiate/Maintain bed/chair alarm  - Obtain necessary fall risk management equipment: walker   - Apply yellow socks and bracelet for high fall risk patients  - Consider moving patient to room near nurses station  Outcome: Progressing

## 2023-04-23 NOTE — ASSESSMENT & PLAN NOTE
Wt Readings from Last 3 Encounters:   04/23/23 123 kg (271 lb 13 2 oz)   04/10/23 122 kg (267 lb 14 4 oz)   06/07/22 131 kg (289 lb)     · Is currently on spironolactone 25 mg daily and metoprolol  mg bid  · Echo 6/7/2022 with EF 40%, left ventricular moderately dilated with systolic function normal, inferior basilar wall motion abnormality, normal diastolic function  · Euvolemic at this time, monitor fluid status with addition of fluids  Gentle IVF rehydration  · Patient still appears euvolemic but having complaints of cough  IVF discontinued  Spironolactone started again  · CXR showing cardiomegaly with mild pulmonary congestion  · BNP: 2553  · Mild edema and shortness of breath on 4/23   Giving 1 dose of 40mg IV lasix  · Echo ordered for Monday  · Heart failure possibly causing cirrhosis/liver congestion  · Monitor fluid status and LFT/INR post lasix

## 2023-04-23 NOTE — ASSESSMENT & PLAN NOTE
· Presented to the ER with elevated LFTs, also elevated at recent ER visit 4/10  · Had negative hepatitis C testing in 2019  · 4/10 today was taking a lot of Tylenol, notes during this admission that she had stopped taking it because her liver enzymes were elevated    Lab Results   Component Value Date     (H) 04/23/2023    AST 33 04/21/2023     (H) 04/23/2023    ALT 99 (H) 04/21/2023     · CT abdomen pelvis done 4/10 -slight liver surface nodularity and volume redistribution which may indicate cirrhosis, small abdominal and pelvic ascites  · Had right upper quadrant abdominal ultrasound done in the ER -hepatomegaly with lobulated hepatic contour which could indicate fibrosis/cirrhosis, possible hemangioma or focal fat on the margin of the right hepatic lobe  · Has been taking large quantities of tylenol, acetaminophen level <10  · Acute hepatitis and AFP labs normal     · Liver doppler normal  · GI: Acute mixed pattern liver enzyme elevation and is downtrending(similar presentation in 2018)  Outpatient MRI liver protocol   Can advance diet to surgical soft with low fiber  · Liver enzymes worsening on 4/23 which resulted in a worsening INR   · Possible hepatic congestion from heart failure  · Giving lasix 40mg IV once - monitor following lasix  · Reconsult GI

## 2023-04-23 NOTE — PLAN OF CARE
Problem: Potential for Falls  Goal: Patient will remain free of falls  Description: INTERVENTIONS:  - Educate patient/family on patient safety including physical limitations  - Instruct patient to call for assistance with activity   - Consult OT/PT to assist with strengthening/mobility   - Keep Call bell within reach  - Keep bed low and locked with side rails adjusted as appropriate  - Keep care items and personal belongings within reach  - Initiate and maintain comfort rounds  - Make Fall Risk Sign visible to staff  - Offer Toileting every 2 Hours, in advance of need  - Initiate/Maintain bed/chair alarm  - Obtain necessary fall risk management equipment: walker   - Apply yellow socks and bracelet for high fall risk patients  - Consider moving patient to room near nurses station  Outcome: Progressing     Problem: MOBILITY - ADULT  Goal: Maintain or return to baseline ADL function  Description: INTERVENTIONS:  -  Assess patient's ability to carry out ADLs; assess patient's baseline for ADL function and identify physical deficits which impact ability to perform ADLs (bathing, care of mouth/teeth, toileting, grooming, dressing, etc )  - Assess/evaluate cause of self-care deficits   - Assess range of motion  - Assess patient's mobility; develop plan if impaired  - Assess patient's need for assistive devices and provide as appropriate  - Encourage maximum independence but intervene and supervise when necessary  - Involve family in performance of ADLs  - Assess for home care needs following discharge   - Consider OT consult to assist with ADL evaluation and planning for discharge  - Provide patient education as appropriate  Outcome: Progressing  Goal: Maintains/Returns to pre admission functional level  Description: INTERVENTIONS:  - Perform BMAT or MOVE assessment daily    - Set and communicate daily mobility goal to care team and patient/family/caregiver     - Collaborate with rehabilitation services on mobility goals if consulted  - Perform Range of Motion 3 times a day  - Reposition patient every 2 hours    - Dangle patient 3 times a day  - Stand patient 3 times a day  - Ambulate patient 3 times a day  - Out of bed to chair 3 times a day   - Out of bed for meals 3 times a day  - Out of bed for toileting  - Record patient progress and toleration of activity level   Outcome: Progressing     Problem: PAIN - ADULT  Goal: Verbalizes/displays adequate comfort level or baseline comfort level  Description: Interventions:  - Encourage patient to monitor pain and request assistance  - Assess pain using appropriate pain scale  - Administer analgesics based on type and severity of pain and evaluate response  - Implement non-pharmacological measures as appropriate and evaluate response  - Consider cultural and social influences on pain and pain management  - Notify physician/advanced practitioner if interventions unsuccessful or patient reports new pain  Outcome: Progressing     Problem: INFECTION - ADULT  Goal: Absence or prevention of progression during hospitalization  Description: INTERVENTIONS:  - Assess and monitor for signs and symptoms of infection  - Monitor lab/diagnostic results  - Monitor all insertion sites, i e  indwelling lines, tubes, and drains  - Monitor endotracheal if appropriate and nasal secretions for changes in amount and color  - [facility] appropriate cooling/warming therapies per order  - Administer medications as ordered  - Instruct and encourage patient and family to use good hand hygiene technique  - Identify and instruct in appropriate isolation precautions for identified infection/condition  Outcome: Progressing  Goal: Absence of fever/infection during neutropenic period  Description: INTERVENTIONS:  - Monitor WBC    Outcome: Progressing     Problem: SAFETY ADULT  Goal: Patient will remain free of falls  Description: INTERVENTIONS:  - Educate patient/family on patient safety including physical limitations  - Instruct patient to call for assistance with activity   - Consult OT/PT to assist with strengthening/mobility   - Keep Call bell within reach  - Keep bed low and locked with side rails adjusted as appropriate  - Keep care items and personal belongings within reach  - Initiate and maintain comfort rounds  - Make Fall Risk Sign visible to staff  - Offer Toileting every 2 Hours, in advance of need  - Initiate/Maintain bed/chair alarm  - Obtain necessary fall risk management equipment: walker   - Apply yellow socks and bracelet for high fall risk patients  - Consider moving patient to room near nurses station  Outcome: Progressing  Goal: Maintain or return to baseline ADL function  Description: INTERVENTIONS:  -  Assess patient's ability to carry out ADLs; assess patient's baseline for ADL function and identify physical deficits which impact ability to perform ADLs (bathing, care of mouth/teeth, toileting, grooming, dressing, etc )  - Assess/evaluate cause of self-care deficits   - Assess range of motion  - Assess patient's mobility; develop plan if impaired  - Assess patient's need for assistive devices and provide as appropriate  - Encourage maximum independence but intervene and supervise when necessary  - Involve family in performance of ADLs  - Assess for home care needs following discharge   - Consider OT consult to assist with ADL evaluation and planning for discharge  - Provide patient education as appropriate  Outcome: Progressing  Goal: Maintains/Returns to pre admission functional level  Description: INTERVENTIONS:  - Perform BMAT or MOVE assessment daily    - Set and communicate daily mobility goal to care team and patient/family/caregiver  - Collaborate with rehabilitation services on mobility goals if consulted  - Perform Range of Motion 3 times a day  - Reposition patient every 2 hours    - Dangle patient 3 times a day  - Stand patient 3 times a day  - Ambulate patient 3 times a day  - Out of bed to chair 3 times a day   - Out of bed for meals 3 times a day  - Out of bed for toileting  - Record patient progress and toleration of activity level   Outcome: Progressing     Problem: DISCHARGE PLANNING  Goal: Discharge to home or other facility with appropriate resources  Description: INTERVENTIONS:  - Identify barriers to discharge w/patient and caregiver  - Arrange for needed discharge resources and transportation as appropriate  - Identify discharge learning needs (meds, wound care, etc )  - Arrange for interpretive services to assist at discharge as needed  - Refer to Case Management Department for coordinating discharge planning if the patient needs post-hospital services based on physician/advanced practitioner order or complex needs related to functional status, cognitive ability, or social support system  Outcome: Progressing     Problem: Knowledge Deficit  Goal: Patient/family/caregiver demonstrates understanding of disease process, treatment plan, medications, and discharge instructions  Description: Complete learning assessment and assess knowledge base  Interventions:  - Provide teaching at level of understanding  - Provide teaching via preferred learning methods  Outcome: Progressing     Problem: Nutrition/Hydration-ADULT  Goal: Nutrient/Hydration intake appropriate for improving, restoring or maintaining nutritional needs  Description: Monitor and assess patient's nutrition/hydration status for malnutrition  Collaborate with interdisciplinary team and initiate plan and interventions as ordered  Monitor patient's weight and dietary intake as ordered or per policy  Utilize nutrition screening tool and intervene as necessary  Determine patient's food preferences and provide high-protein, high-caloric foods as appropriate       INTERVENTIONS:  - Monitor oral intake, urinary output, labs, and treatment plans  - Assess nutrition and hydration status and recommend course of action  - Evaluate amount of meals eaten  - Assist patient with eating if necessary   - Allow adequate time for meals  - Recommend/ encourage appropriate diets, oral nutritional supplements, and vitamin/mineral supplements  - Order, calculate, and assess calorie counts as needed  - Recommend, monitor, and adjust tube feedings and TPN/PPN based on assessed needs  - Assess need for intravenous fluids  - Provide specific nutrition/hydration education as appropriate  - Include patient/family/caregiver in decisions related to nutrition  Outcome: Progressing

## 2023-04-23 NOTE — ASSESSMENT & PLAN NOTE
· Patient presents to the ER with 3-week history of nausea vomiting and weakness  Notes this started after increasing her metoprolol? She states that she did have h  Pylori in the past  No history of liver disease  · Unclear etiology  · Electrolytes on admission within normal limits-continue to monitor  · Trial of antiemetics  · IV protonix 40 mg bid  · GI consult: plan for possible no need for EGD as patient is tolerating diet with no more nausea or vomiting  May trial food for today    Follow with Dr Merritt Senior outpatient  · Discontinue IVF due to CHF history, monitor fluid status and add fluids if further vomiting  · No further events of nausea or vomiting  · Diabetic diet  Resolved

## 2023-04-23 NOTE — ASSESSMENT & PLAN NOTE
· Patient on coumadin for a fib  She took her last dose on Monday  Had supratherapeutic INR on 4/18  Repeat on 4/19 still elevated  · Holding coumadin at this time  · GI: Do not see a need to reverse given there is no emergent need in performing EGD   Continue to monitor  · Midline placed due to difficulty obtaining labs  · INR levels increasing due to worsening liver enzymes  · Monitor with morning INR levels    Lab Results   Component Value Date    INR 4 53 (H) 04/23/2023    INR 4 28 (H) 04/22/2023    INR 3 79 (H) 04/21/2023

## 2023-04-23 NOTE — PROGRESS NOTES
[address]  Progress Note  Name: Ngoc Forman  MRN: [id number]  Unit/Bed#: -01 I Date of Admission: 4/18/2023   Date of Service: 4/23/2023 I Hospital Day: 5    Assessment/Plan   * Nausea and vomiting  Assessment & Plan  · Patient presents to the ER with 3-week history of nausea vomiting and weakness  Notes this started after increasing her metoprolol? She states that she did have h  Pylori in the past  No history of liver disease  · Unclear etiology  · Electrolytes on admission within normal limits-continue to monitor  · Trial of antiemetics  · IV protonix 40 mg bid  · GI consult: plan for possible no need for EGD as patient is tolerating diet with no more nausea or vomiting  May trial food for today  Follow with Dr Maria Elena White outpatient  · Discontinue IVF due to CHF history, monitor fluid status and add fluids if further vomiting  · No further events of nausea or vomiting  · Diabetic diet  Resolved    Supratherapeutic INR  Assessment & Plan  · Patient on coumadin for a fib  She took her last dose on Monday  Had supratherapeutic INR on 4/18  Repeat on 4/19 still elevated  · Holding coumadin at this time  · GI: Do not see a need to reverse given there is no emergent need in performing EGD   Continue to monitor  · Midline placed due to difficulty obtaining labs  · INR levels increasing due to worsening liver enzymes  · Monitor with morning INR levels    Lab Results   Component Value Date    INR 4 53 (H) 04/23/2023    INR 4 28 (H) 04/22/2023    INR 3 79 (H) 04/21/2023       Elevated LFTs  Assessment & Plan  · Presented to the ER with elevated LFTs, also elevated at recent ER visit 4/10  · Had negative hepatitis C testing in 2019  · 4/10 today was taking a lot of Tylenol, notes during this admission that she had stopped taking it because her liver enzymes were elevated    Lab Results   Component Value Date     (H) 04/23/2023    AST 33 04/21/2023     (H) 04/23/2023    ALT 99 (H) 04/21/2023     · CT abdomen pelvis done 4/10 -slight liver surface nodularity and volume redistribution which may indicate cirrhosis, small abdominal and pelvic ascites  · Had right upper quadrant abdominal ultrasound done in the ER -hepatomegaly with lobulated hepatic contour which could indicate fibrosis/cirrhosis, possible hemangioma or focal fat on the margin of the right hepatic lobe  · Has been taking large quantities of tylenol, acetaminophen level <10  · Acute hepatitis and AFP labs normal     · Liver doppler normal  · GI: Acute mixed pattern liver enzyme elevation and is downtrending(similar presentation in 2018)  Outpatient MRI liver protocol  Can advance diet to surgical soft with low fiber  · Liver enzymes worsening on 4/23 which resulted in a worsening INR   · Possible hepatic congestion from heart failure  · Giving lasix 40mg IV once - monitor following lasix  · Reconsult GI    Anxiety and depression  Assessment & Plan  · Currently taking effexor 150 mg outpatient  · Continue    Atrial fibrillation ([address] )  Assessment & Plan  · Currently taking metoprolol  mg bid outpatient     · ECG in NSR  · Monitor    Type 2 diabetes mellitus, without long-term current use of insulin [facility])  Assessment & Plan  Lab Results   Component Value Date    HGBA1C 6 8 (H) 04/18/2023       Recent Labs     04/22/23  1549 04/22/23  2109 04/23/23  0727 04/23/23  1123   POCGLU 100 102 93 164*       Blood Sugar Average: Last 72 hrs:  · (P) 126 [id number]   · Currently as an outpatient seems to be on metformin, not checking her blood pressures at home per last PCP note  · Would hold metformin given nausea vomiting  · Start on correctional scale with meals, watch for hypoglycemia given poor oral intake    Combined systolic and diastolic congestive heart failure (HCC)  Assessment & Plan  Wt Readings from Last 3 Encounters:   04/23/23 123 kg (271 lb 13 2 oz)   04/10/23 122 kg (267 lb 14 4 oz) 06/07/22 131 kg (289 lb)     · Is currently on spironolactone 25 mg daily and metoprolol  mg bid  · Echo 6/7/2022 with EF 40%, left ventricular moderately dilated with systolic function normal, inferior basilar wall motion abnormality, normal diastolic function  · Euvolemic at this time, monitor fluid status with addition of fluids  Gentle IVF rehydration  · Patient still appears euvolemic but having complaints of cough  IVF discontinued  Spironolactone started again  · CXR showing cardiomegaly with mild pulmonary congestion  · BNP: 2553  · Mild edema and shortness of breath on 4/23  Giving 1 dose of 40mg IV lasix  · Echo ordered for Monday  · Heart failure possibly causing cirrhosis/liver congestion  · Monitor fluid status and LFT/INR post lasix    Disease of thyroid gland  Assessment & Plan  · Maintained on levothyroxine 175 mcg tablet  · Had recent TSH/T4 checked as an outpatient-TSH was low but T4 normal           VTE Pharmacologic Prophylaxis: VTE Score: 5 High Risk (Score >/= 5) - Pharmacological DVT Prophylaxis Contraindicated  Sequential Compression Devices Ordered  Patient Centered Rounds: I performed bedside rounds with nursing staff today  Discussions with Specialists or Other Care Team Provider: None    Education and Discussions with Family / Patient: Attempted to update  (daughter) via phone  Left voicemail  Total Time Spent on Date of Encounter in care of patient: 45 minutes This time was spent on one or more of the following: performing physical exam; counseling and coordination of care; obtaining or reviewing history; documenting in the medical record; reviewing/ordering tests, medications or procedures; communicating with other healthcare professionals and discussing with patient's family/caregivers      Current Length of Stay: 5 day(s)  Current Patient Status: Inpatient   Certification Statement: The patient will continue to require additional inpatient hospital stay due to CHF and elevated LFT  Discharge Plan: Anticipate discharge in 48 hrs to home with home services  Code Status: Level 3 - DNAR and DNI    Subjective:   Patient states that she was having some nausea this morning  Denies chest pain or shortness of breath  Objective:     Vitals:   Temp (24hrs), Av 4 °F (36 3 °C), Min:97 2 °F (36 2 °C), Max:97 5 °F (36 4 °C)    Temp:  [97 2 °F (36 2 °C)-97 5 °F (36 4 °C)] 97 2 °F (36 2 °C)  HR:  [61-66] 61  Resp:  [16-24] 18  BP: (133-168)/(75-95) 136/82  SpO2:  [95 %-96 %] 96 %  Body mass index is 46 66 kg/m²  Input and Output Summary (last 24 hours): Intake/Output Summary (Last 24 hours) at 2023 1417  Last data filed at 2023 1700  Gross per 24 hour   Intake 60 ml   Output --   Net 60 ml       Physical Exam:   Physical Exam  Vitals reviewed  Constitutional:       General: She is not in acute distress  Appearance: Normal appearance  She is obese  She is not ill-appearing  HENT:      Head: Normocephalic and atraumatic  Nose: Nose normal       Mouth/Throat:      Mouth: Mucous membranes are moist       Pharynx: Oropharynx is clear  Eyes:      Extraocular Movements: Extraocular movements intact  Conjunctiva/sclera: Conjunctivae normal    Cardiovascular:      Rate and Rhythm: Normal rate and regular rhythm  Pulses: Normal pulses  Heart sounds: Normal heart sounds  No murmur heard  Pulmonary:      Effort: Pulmonary effort is normal  No respiratory distress  Breath sounds: Normal breath sounds  No wheezing or rhonchi  Abdominal:      General: Abdomen is flat  Bowel sounds are normal  There is no distension  Palpations: Abdomen is soft  Tenderness: There is no abdominal tenderness  There is no guarding  Musculoskeletal:         General: No tenderness  Normal range of motion  Cervical back: Normal range of motion  Right lower leg: Edema present  Left lower leg: Edema present        Comments: Mild edema in bilateral lower extremities   Skin:     General: Skin is warm  Neurological:      General: No focal deficit present  Mental Status: She is alert and oriented to person, place, and time  Mental status is at baseline  Motor: No weakness  Psychiatric:         Mood and Affect: Mood normal          Behavior: Behavior normal          Thought Content: Thought content normal          Judgment: Judgment normal           Additional Data:     Labs:  Results from last 7 days   Lab Units 04/23/23  1005 04/19/23  0543 04/18/23  1133   WBC Thousand/uL 7 82   < > 8 96   HEMOGLOBIN g/dL 12 7   < > 14 3   HEMATOCRIT % 41 9   < > 48 5*   PLATELETS Thousands/uL 302   < > 309   NEUTROS PCT %  --   --  73   LYMPHS PCT %  --   --  18   MONOS PCT %  --   --  7   EOS PCT %  --   --  0    < > = values in this interval not displayed       Results from last 7 days   Lab Units 04/23/23  1005   SODIUM mmol/L 133*   POTASSIUM mmol/L 4 8   CHLORIDE mmol/L 98   CO2 mmol/L 26   BUN mg/dL 19   CREATININE mg/dL 1 02   ANION GAP mmol/L 9   CALCIUM mg/dL 9 0   ALBUMIN g/dL 3 7   TOTAL BILIRUBIN mg/dL 2 06*   ALK PHOS U/L 124*   ALT U/L 254*   AST U/L 230*   GLUCOSE RANDOM mg/dL 150*     Results from last 7 days   Lab Units 04/23/23  0600   INR  4 53*     Results from last 7 days   Lab Units 04/23/23  1123 04/23/23  0727 04/22/23  2109 04/22/23  1549 04/22/23  1118 04/22/23  0754 04/21/23 2052 04/21/23  1545 04/21/23  1141 04/21/23  0734 04/20/23 2059 04/20/23  1536   POC GLUCOSE mg/dl 164* 93 102 100 159* 104 146* 156* 101 102 163* 114     Results from last 7 days   Lab Units 04/18/23  1133   HEMOGLOBIN A1C % 6 8*     Results from last 7 days   Lab Units 04/19/23  0543 04/18/23  2255 04/18/23 2013 04/18/23  1836   LACTIC ACID mmol/L  --  1 7 2 1*  --    PROCALCITONIN ng/ml 0 14  --   --  0 14       Lines/Drains:  Invasive Devices     Peripheral Intravenous Line  Duration           Long-Dwell Peripheral IV (Midline) 04/21/23 Left 2 days          Drain  Duration           External Urinary Catheter <1 day                      Imaging: Reviewed radiology reports from this admission including: chest xray    Recent Cultures (last 7 days):         Last 24 Hours Medication List:   Current Facility-Administered Medications   Medication Dose Route Frequency Provider Last Rate   • aluminum-magnesium hydroxide-simethicone  30 mL Oral Q6H PRN Barbi Mcmahon PA-C     • docusate sodium  100 mg Oral BID Barbi Mcmahon PA-C     • insulin lispro  2-12 Units Subcutaneous TID AC Barbi Mcmahon PA-C     • insulin lispro  2-12 Units Subcutaneous HS Barbi Mcmahon PA-C     • levothyroxine  175 mcg Oral Daily Trina Matt MD     • metoprolol succinate  100 mg Oral BID Trina Matt MD     • nystatin   Topical BID Barbi Mcmahon PA-C     • ondansetron  4 mg Intravenous Q6H PRN Barbi Mcmahon PA-C     • pantoprazole  40 mg Intravenous Q12H [address] Aleida Quigley PA-C     • polyethylene glycol  17 g Oral Daily Sherren Lewandowsky [place], PA-C     • spironolactone  25 mg Oral Daily Barbi Mcmahon PA-C     • venlafaxine  150 mg Oral Daily Barbi Mcmahon PA-C          Today, Patient Was Seen By: Ana Frias PA-C    **Please Note: This note may have been constructed using a voice recognition system  **

## 2023-04-23 NOTE — ASSESSMENT & PLAN NOTE
Lab Results   Component Value Date    HGBA1C 6 8 (H) 04/18/2023       Recent Labs     04/22/23  1549 04/22/23  2109 04/23/23  0727 04/23/23  1123   POCGLU 100 102 93 164*       Blood Sugar Average: Last 72 hrs:  · (P) 642 [id number]   · Currently as an outpatient seems to be on metformin, not checking her blood pressures at home per last PCP note  · Would hold metformin given nausea vomiting  · Start on correctional scale with meals, watch for hypoglycemia given poor oral intake

## 2023-04-24 ENCOUNTER — APPOINTMENT (INPATIENT)
Dept: NON INVASIVE DIAGNOSTICS | Facility: HOSPITAL | Age: 73
End: 2023-04-24

## 2023-04-24 LAB
ALBUMIN SERPL BCP-MCNC: 3.6 G/DL (ref 3.5–5)
ALP SERPL-CCNC: 121 U/L (ref 34–104)
ALT SERPL W P-5'-P-CCNC: 233 U/L (ref 7–52)
ANA SER QL IA: NEGATIVE
ANION GAP SERPL CALCULATED.3IONS-SCNC: 10 MMOL/L (ref 4–13)
APTT PPP: 45 SECONDS (ref 23–37)
AST SERPL W P-5'-P-CCNC: 151 U/L (ref 13–39)
BILIRUB SERPL-MCNC: 1.6 MG/DL (ref 0.2–1)
BUN SERPL-MCNC: 19 MG/DL (ref 5–25)
CALCIUM SERPL-MCNC: 8.8 MG/DL (ref 8.4–10.2)
CHLORIDE SERPL-SCNC: 97 MMOL/L (ref 96–108)
CO2 SERPL-SCNC: 29 MMOL/L (ref 21–32)
CREAT SERPL-MCNC: 1.01 MG/DL (ref 0.6–1.3)
D DIMER PPP FEU-MCNC: 0.64 UG/ML FEU
FERRITIN SERPL-MCNC: 275 NG/ML (ref 8–388)
FIBRINOGEN PPP-MCNC: 357 MG/DL (ref 227–495)
GFR SERPL CREATININE-BSD FRML MDRD: 55 ML/MIN/1.73SQ M
GLUCOSE SERPL-MCNC: 102 MG/DL (ref 65–140)
GLUCOSE SERPL-MCNC: 148 MG/DL (ref 65–140)
GLUCOSE SERPL-MCNC: 177 MG/DL (ref 65–140)
GLUCOSE SERPL-MCNC: 182 MG/DL (ref 65–140)
GLUCOSE SERPL-MCNC: 85 MG/DL (ref 65–140)
HAV AB SER QL IA: NORMAL
HBV SURFACE AB SER-ACNC: <3 MIU/ML
IGG SERPL-MCNC: 815 MG/DL (ref 700–1600)
INR PPP: 3.19 (ref 0.84–1.19)
IRON SATN MFR SERPL: 15 % (ref 15–50)
IRON SERPL-MCNC: 57 UG/DL (ref 50–170)
POTASSIUM SERPL-SCNC: 4 MMOL/L (ref 3.5–5.3)
PROT SERPL-MCNC: 6.1 G/DL (ref 6.4–8.4)
PROTHROMBIN TIME: 32.7 SECONDS (ref 11.6–14.5)
SODIUM SERPL-SCNC: 136 MMOL/L (ref 135–147)
TIBC SERPL-MCNC: 380 UG/DL (ref 250–450)

## 2023-04-24 RX ADMIN — VENLAFAXINE HYDROCHLORIDE 150 MG: 150 CAPSULE, EXTENDED RELEASE ORAL at 08:33

## 2023-04-24 RX ADMIN — NYSTATIN 1 APPLICATION.: 100000 POWDER TOPICAL at 17:08

## 2023-04-24 RX ADMIN — SPIRONOLACTONE 25 MG: 25 TABLET ORAL at 08:33

## 2023-04-24 RX ADMIN — INSULIN LISPRO 2 UNITS: 100 INJECTION, SOLUTION INTRAVENOUS; SUBCUTANEOUS at 12:28

## 2023-04-24 RX ADMIN — POLYETHYLENE GLYCOL 3350 17 G: 17 POWDER, FOR SOLUTION ORAL at 08:34

## 2023-04-24 RX ADMIN — DOCUSATE SODIUM 100 MG: 100 CAPSULE ORAL at 08:33

## 2023-04-24 RX ADMIN — DOCUSATE SODIUM 100 MG: 100 CAPSULE ORAL at 17:08

## 2023-04-24 RX ADMIN — LEVOTHYROXINE SODIUM 175 MCG: 175 TABLET ORAL at 05:07

## 2023-04-24 RX ADMIN — PERFLUTREN 0.4 ML/MIN: 6.52 INJECTION, SUSPENSION INTRAVENOUS at 15:15

## 2023-04-24 RX ADMIN — METOPROLOL SUCCINATE 100 MG: 100 TABLET, EXTENDED RELEASE ORAL at 08:33

## 2023-04-24 RX ADMIN — METOPROLOL SUCCINATE 100 MG: 100 TABLET, EXTENDED RELEASE ORAL at 20:06

## 2023-04-24 RX ADMIN — PANTOPRAZOLE SODIUM 40 MG: 40 INJECTION, POWDER, FOR SOLUTION INTRAVENOUS at 20:06

## 2023-04-24 RX ADMIN — INSULIN LISPRO 2 UNITS: 100 INJECTION, SOLUTION INTRAVENOUS; SUBCUTANEOUS at 21:02

## 2023-04-24 RX ADMIN — NYSTATIN 1 APPLICATION.: 100000 POWDER TOPICAL at 08:34

## 2023-04-24 RX ADMIN — PANTOPRAZOLE SODIUM 40 MG: 40 INJECTION, POWDER, FOR SOLUTION INTRAVENOUS at 08:33

## 2023-04-24 NOTE — PROGRESS NOTES
[address]  Progress Note  Name: Lucho Wade  MRN: [id number]  Unit/Bed#: -01 I Date of Admission: 4/18/2023   Date of Service: 4/24/2023 I Hospital Day: 6    Assessment/Plan   * Nausea and vomiting  Assessment & Plan  · Patient presents to the ER with 3-week history of nausea vomiting and weakness  Notes this started after increasing her metoprolol? She states that she did have h  Pylori in the past  No history of liver disease  · Unclear etiology  · Electrolytes on admission within normal limits-continue to monitor  · Trial of antiemetics  · IV protonix 40 mg bid  · GI consult: plan for possible no need for EGD as patient is tolerating diet with no more nausea or vomiting  May trial food for today  Follow with Dr Nel Llanes outpatient  · Discontinue IVF due to CHF history, monitor fluid status and add fluids if further vomiting  · No further events of nausea or vomiting  · Diabetic diet  Resolved    Elevated LFTs  Assessment & Plan  · Presented to the ER with elevated LFTs, also elevated at recent ER visit 4/10  · Had negative hepatitis C testing in 2019  · 4/10 today was taking a lot of Tylenol, notes during this admission that she had stopped taking it because her liver enzymes were elevated    Lab Results   Component Value Date     (H) 04/24/2023     (H) 04/23/2023     (H) 04/24/2023     (H) 04/23/2023     · CT abdomen pelvis done 4/10 -slight liver surface nodularity and volume redistribution which may indicate cirrhosis, small abdominal and pelvic ascites  · Had right upper quadrant abdominal ultrasound done in the ER -hepatomegaly with lobulated hepatic contour which could indicate fibrosis/cirrhosis, possible hemangioma or focal fat on the margin of the right hepatic lobe  · Has been taking large quantities of tylenol, acetaminophen level <10     · Acute hepatitis and AFP labs normal     · Liver doppler normal  · GI: Acute mixed pattern liver enzyme elevation and is downtrending(similar presentation in 2018)  Outpatient MRI liver protocol  Can advance diet to surgical soft with low fiber  · Liver enzymes worsening on 4/23 which resulted in a worsening INR   · Possible hepatic congestion from heart failure  · Giving lasix 40mg IV once - monitor following lasix  · GI reconsult: ordered further labs to evaluate for possible DIC  ALETA negative, IgG normal, iron panel normal, fibrinogen normal, d-dimer slightly elevated  · Pending antimitochondrial antibody, anti-smooth muscle antibody, factor 8 activity, hepatitis a antibody, hepatitis b surface antibody, alpha-1 antitrypsin, ceruloplasmin     Supratherapeutic INR  Assessment & Plan  · Patient on coumadin for a fib  She took her last dose on Monday  Had supratherapeutic INR on 4/18  Repeat on 4/19 still elevated  · Holding coumadin at this time  · GI: Do not see a need to reverse given there is no emergent need in performing EGD  Continue to monitor  · Midline placed due to difficulty obtaining labs  · Suspect INR levels increasing due to worsening liver enzymes, INR decreased today as did liver enzymes  · Monitor with morning INR levels  · GI ordering other labs, see under elevated LFTs     Lab Results   Component Value Date    INR 3 19 (H) 04/24/2023    INR 4 53 (H) 04/23/2023    INR 4 28 (H) 04/22/2023       Anxiety and depression  Assessment & Plan  · Currently taking effexor 150 mg outpatient  · Continue    Atrial fibrillation ([facility] [address] )  Assessment & Plan  · Currently taking metoprolol  mg bid outpatient     · ECG in NSR  · Monitor    Type 2 diabetes mellitus, without long-term current use of insulin [facility])  Assessment & Plan  Lab Results   Component Value Date    HGBA1C 6 8 (H) 04/18/2023       Recent Labs     04/23/23  1538 04/23/23  2108 04/24/23  0717 04/24/23  1114   POCGLU 161* 123 102 177*       Blood Sugar Average: Last 72 hrs:  · (P) 127 [id number]   · Currently as an outpatient seems to be on metformin, not checking her blood pressures at home per last PCP note  · Would hold metformin given nausea vomiting  · Start on correctional scale with meals, watch for hypoglycemia given poor oral intake    Combined systolic and diastolic congestive heart failure ([address] )  Assessment & Plan  Wt Readings from Last 3 Encounters:   04/24/23 120 kg (264 lb 15 9 oz)   04/10/23 122 kg (267 lb 14 4 oz)   06/07/22 131 kg (289 lb)     · Is currently on spironolactone 25 mg daily and metoprolol  mg bid  · Echo 6/7/2022 with EF 40%, left ventricular moderately dilated with systolic function normal, inferior basilar wall motion abnormality, normal diastolic function  · Euvolemic at this time, monitor fluid status with addition of fluids  Gentle IVF rehydration  · Patient still appears euvolemic but having complaints of cough  IVF discontinued  Spironolactone started again  · CXR showing cardiomegaly with mild pulmonary congestion  · BNP: 2553  · Mild edema and shortness of breath on 4/23  Giving 1 dose of 40mg IV lasix  · Echo ordered  · Heart failure possibly causing cirrhosis/liver congestion  · Monitor fluid status and LFT/INR post lasix    Disease of thyroid gland  Assessment & Plan  · Maintained on levothyroxine 175 mcg tablet  · Had recent TSH/T4 checked as an outpatient-TSH was low but T4 normal         VTE Pharmacologic Prophylaxis: VTE Score: 5 High Risk (Score >/= 5) - Pharmacological DVT Prophylaxis Contraindicated  Sequential Compression Devices Ordered  Patient Centered Rounds: I performed bedside rounds with nursing staff today  Discussions with Specialists or Other Care Team Provider: nursing, case management, GI    Education and Discussions with Family / Patient: Attempted to update  (granddaughter) via phone  Left voicemail       Total Time Spent on Date of Encounter in care of patient: 55 minutes This time was spent on one or more of the following: performing physical exam; counseling and coordination of care; obtaining or reviewing history; documenting in the medical record; reviewing/ordering tests, medications or procedures; communicating with other healthcare professionals and discussing with patient's family/caregivers  Current Length of Stay: 6 day(s)  Current Patient Status: Inpatient   Certification Statement: The patient will continue to require additional inpatient hospital stay due to elevated INR  Discharge Plan: Anticipate discharge in 48 hrs to home with home services  Code Status: Level 3 - DNAR and DNI    Subjective:   Patient seen and examined at bedside this morning  She states she is feeling better today than yesterday  She has complaints of being tired  She did not have any further nausea or vomiting and tolerating her diet well  Denies fever, chills, chest pain, SOB  Objective:     Vitals:   Temp (24hrs), Av 1 °F (36 2 °C), Min:97 °F (36 1 °C), Max:97 3 °F (36 3 °C)    Temp:  [97 °F (36 1 °C)-97 3 °F (36 3 °C)] 97 °F (36 1 °C)  HR:  [51-60] 56  Resp:  [17-20] 17  BP: (107-152)/(77-82) 148/78  SpO2:  [93 %-96 %] 93 %  Body mass index is 45 49 kg/m²  Input and Output Summary (last 24 hours): Intake/Output Summary (Last 24 hours) at 2023 1444  Last data filed at 2023 0505  Gross per 24 hour   Intake --   Output 2650 ml   Net -2650 ml       Physical Exam:   Physical Exam  Vitals reviewed  Constitutional:       General: She is not in acute distress  Appearance: She is obese  She is not ill-appearing or toxic-appearing  HENT:      Head: Normocephalic and atraumatic  Nose: Nose normal       Mouth/Throat:      Mouth: Mucous membranes are moist    Eyes:      Pupils: Pupils are equal, round, and reactive to light  Cardiovascular:      Rate and Rhythm: Normal rate and regular rhythm  Heart sounds: Normal heart sounds  Pulmonary:      Effort: No respiratory distress  Breath sounds: No stridor   No wheezing  Abdominal:      General: Bowel sounds are normal  There is no distension  Palpations: Abdomen is soft  There is no mass  Tenderness: There is no abdominal tenderness  Musculoskeletal:         General: Normal range of motion  Right lower leg: No edema  Left lower leg: No edema  Skin:     General: Skin is warm and dry  Neurological:      General: No focal deficit present  Mental Status: She is alert and oriented to person, place, and time  Psychiatric:         Mood and Affect: Mood normal          Behavior: Behavior normal           Additional Data:     Labs:  Results from last 7 days   Lab Units 04/23/23  1005 04/19/23  0543 04/18/23  1133   WBC Thousand/uL 7 82   < > 8 96   HEMOGLOBIN g/dL 12 7   < > 14 3   HEMATOCRIT % 41 9   < > 48 5*   PLATELETS Thousands/uL 302   < > 309   NEUTROS PCT %  --   --  73   LYMPHS PCT %  --   --  18   MONOS PCT %  --   --  7   EOS PCT %  --   --  0    < > = values in this interval not displayed       Results from last 7 days   Lab Units 04/24/23  0508   SODIUM mmol/L 136   POTASSIUM mmol/L 4 0   CHLORIDE mmol/L 97   CO2 mmol/L 29   BUN mg/dL 19   CREATININE mg/dL 1 01   ANION GAP mmol/L 10   CALCIUM mg/dL 8 8   ALBUMIN g/dL 3 6   TOTAL BILIRUBIN mg/dL 1 60*   ALK PHOS U/L 121*   ALT U/L 233*   AST U/L 151*   GLUCOSE RANDOM mg/dL 85     Results from last 7 days   Lab Units 04/24/23  0508   INR  3 19*     Results from last 7 days   Lab Units 04/24/23  1114 04/24/23  0717 04/23/23  2108 04/23/23  1538 04/23/23  1123 04/23/23  0727 04/22/23  2109 04/22/23  1549 04/22/23  1118 04/22/23  0754 04/21/23  2052 04/21/23  1545   POC GLUCOSE mg/dl 177* 102 123 161* 164* 93 102 100 159* 104 146* 156*     Results from last 7 days   Lab Units 04/18/23  1133   HEMOGLOBIN A1C % 6 8*     Results from last 7 days   Lab Units 04/19/23  0543 04/18/23  2255 04/18/23 2013 04/18/23  1836   LACTIC ACID mmol/L  --  1 7 2 1*  --    PROCALCITONIN ng/ml 0 14  --   -- 0 14       Lines/Drains:  Invasive Devices     Peripheral Intravenous Line  Duration           Long-Dwell Peripheral IV (Midline) 04/21/23 Left 3 days          Drain  Duration           External Urinary Catheter 1 day                      Imaging: Reviewed radiology reports from this admission including: US liver doppler, XR chest, US RUQ    Recent Cultures (last 7 days):         Last 24 Hours Medication List:   Current Facility-Administered Medications   Medication Dose Route Frequency Provider Last Rate   • aluminum-magnesium hydroxide-simethicone  30 mL Oral Q6H PRN Hollie Harvey PA-C     • docusate sodium  100 mg Oral BID Hollie Harvey PA-C     • insulin lispro  2-12 Units Subcutaneous TID AC Hollie Harvey PA-C     • insulin lispro  2-12 Units Subcutaneous HS Hollie Harvey PA-C     • levothyroxine  175 mcg Oral Daily Dwayne Martínez MD     • metoprolol succinate  100 mg Oral BID Dwayne Martínez MD     • nystatin   Topical BID Hollie Harvey PA-C     • ondansetron  4 mg Intravenous Q6H PRN Hollie Harvey PA-C     • pantoprazole  40 mg Intravenous Q12H [address] Hollie Harvey PA-C     • polyethylene glycol  17 g Oral Daily Hollie Harvey PA-C     • spironolactone  25 mg Oral Daily Hollie Harvey PA-C     • venlafaxine  150 mg Oral Daily Hollie Harvey PA-C          Today, Patient Was Seen By: Hollie Harvey PA-C    **Please Note: This note may have been constructed using a voice recognition system  **

## 2023-04-24 NOTE — ASSESSMENT & PLAN NOTE
Lab Results   Component Value Date    HGBA1C 6 8 (H) 04/18/2023       Recent Labs     04/23/23  1538 04/23/23  2108 04/24/23  0717 04/24/23  1114   POCGLU 161* 123 102 177*       Blood Sugar Average: Last 72 hrs:  · (P) 127 [id number]   · Currently as an outpatient seems to be on metformin, not checking her blood pressures at home per last PCP note  · Would hold metformin given nausea vomiting  · Start on correctional scale with meals, watch for hypoglycemia given poor oral intake

## 2023-04-24 NOTE — ASSESSMENT & PLAN NOTE
· Patient presents to the ER with 3-week history of nausea vomiting and weakness  Notes this started after increasing her metoprolol? She states that she did have h  Pylori in the past  No history of liver disease  · Unclear etiology  · Electrolytes on admission within normal limits-continue to monitor  · Trial of antiemetics  · IV protonix 40 mg bid  · GI consult: plan for possible no need for EGD as patient is tolerating diet with no more nausea or vomiting  May trial food for today    Follow with Dr Eli Hernandez outpatient  · Discontinue IVF due to CHF history, monitor fluid status and add fluids if further vomiting  · No further events of nausea or vomiting  · Diabetic diet  Resolved

## 2023-04-24 NOTE — ASSESSMENT & PLAN NOTE
· Patient on coumadin for a fib  She took her last dose on Monday  Had supratherapeutic INR on 4/18  Repeat on 4/19 still elevated  · Holding coumadin at this time  · GI: Do not see a need to reverse given there is no emergent need in performing EGD  Continue to monitor  · Midline placed due to difficulty obtaining labs  · Suspect INR levels increasing due to worsening liver enzymes, INR decreased today as did liver enzymes     · Monitor with morning INR levels  · GI ordering other labs, see under elevated LFTs     Lab Results   Component Value Date    INR 3 19 (H) 04/24/2023    INR 4 53 (H) 04/23/2023    INR 4 28 (H) 04/22/2023

## 2023-04-24 NOTE — PLAN OF CARE
Problem: Potential for Falls  Goal: Patient will remain free of falls  Description: INTERVENTIONS:  - Educate patient/family on patient safety including physical limitations  - Instruct patient to call for assistance with activity   - Consult OT/PT to assist with strengthening/mobility   - Keep Call bell within reach  - Keep bed low and locked with side rails adjusted as appropriate  - Keep care items and personal belongings within reach  - Initiate and maintain comfort rounds  - Make Fall Risk Sign visible to staff  - Offer Toileting every 2 Hours, in advance of need  - Initiate/Maintain bed/chair alarm  - Obtain necessary fall risk management equipment: walker   - Apply yellow socks and bracelet for high fall risk patients  - Consider moving patient to room near nurses station  Outcome: Progressing     Problem: MOBILITY - ADULT  Goal: Maintain or return to baseline ADL function  Description: INTERVENTIONS:  -  Assess patient's ability to carry out ADLs; assess patient's baseline for ADL function and identify physical deficits which impact ability to perform ADLs (bathing, care of mouth/teeth, toileting, grooming, dressing, etc )  - Assess/evaluate cause of self-care deficits   - Assess range of motion  - Assess patient's mobility; develop plan if impaired  - Assess patient's need for assistive devices and provide as appropriate  - Encourage maximum independence but intervene and supervise when necessary  - Involve family in performance of ADLs  - Assess for home care needs following discharge   - Consider OT consult to assist with ADL evaluation and planning for discharge  - Provide patient education as appropriate  Outcome: Progressing  Goal: Maintains/Returns to pre admission functional level  Description: INTERVENTIONS:  - Perform BMAT or MOVE assessment daily    - Set and communicate daily mobility goal to care team and patient/family/caregiver     - Collaborate with rehabilitation services on mobility goals if consulted  - Perform Range of Motion 3 times a day  - Reposition patient every 2 hours    - Dangle patient 3 times a day  - Stand patient 3 times a day  - Ambulate patient 3 times a day  - Out of bed to chair 3 times a day   - Out of bed for meals 3 times a day  - Out of bed for toileting  - Record patient progress and toleration of activity level   Outcome: Progressing

## 2023-04-24 NOTE — OCCUPATIONAL THERAPY NOTE
"  Occupational Therapy Progress Note     Patient Name: Eliceo Stubbs  YCBAU'H Date: 4/24/2023  Problem List  Principal Problem:    Nausea and vomiting  Active Problems:    Disease of thyroid gland    Combined systolic and diastolic congestive heart failure (HCC)    Type 2 diabetes mellitus, without long-term current use of insulin (HCC)    Atrial fibrillation (HCC)    Anxiety and depression    Elevated LFTs    Supratherapeutic INR       04/24/23 1400   Note Type   Note Type Treatment   Pain Assessment   Pain Assessment Tool 0-10   Pain Score No Pain   Restrictions/Precautions   Other Precautions Chair Alarm; Bed Alarm; Fall Risk   ADL   Grooming Assistance 5  Supervision/Setup   Grooming Deficit Setup;Brushing hair  (wash hair)   Toileting Assistance  5  Supervision/Setup   Toileting Deficit Increased time to complete   Bed Mobility   Supine to Sit 6  Modified independent   Additional items Increased time required;HOB elevated   Additional Comments Pt supine in bed at beginning of session  Supine to sit @ Mod I  Pt able to maintain seated at EOB for approximately 10 minutes while washing/brushing hair with Good balance  At end of session, sit to supine @ Mod I  Pt supine in bed at end of session with all needs met  Transfers   Additional items Increased time required;Verbal cues   Stand to Sit 5  Supervision   Additional items Increased time required;Verbal cues   Stand pivot 5  Supervision   Additional items Increased time required;Verbal cues   Toilet transfer 4  Minimal assistance   Additional items Assist x 1; Increased time required;Verbal cues;Standard toilet   Additional Comments STS from EOB to RW @ SBA, cues for hand placement  Pt able to complete functional mobility to/from bathroom and in hallway with RW @ S  Toilet transfer @ Min A d/t low surface     Subjective   Subjective \"it's amazing how quick you get weak\"   Cognition   Overall Cognitive Status WFL   Arousal/Participation " Alert; Responsive; Cooperative   Attention Within functional limits   Orientation Level Oriented X4   Memory Within functional limits   Following Commands Follows one step commands without difficulty   Activity Tolerance   Activity Tolerance Patient tolerated treatment well;Patient limited by fatigue   Medical Staff Made Aware Spoke with PCA Patsy   Assessment   Assessment Pt completed OT tx session #1 focused on ADL performance and functional mobility  Pt alert and agreeable to participate  Pt demonstrated improvements in transfer status and endurance this session but continues to be limited by fatigue and generalized weakness  Pt currently completing UB ADLs @ S/set-up, LB ADLs @ Min A, and functional mobility with RW @ S  Pt demonstrating Good participation and Good potential to achieve goals but is currently demonstrating deficits in decrease endurance, decrease activity tolerance, decrease standing balance, decrease performance during ADL tasks, decrease generalized strength, decrease activity engagement and decrease performance during functional transfers  Continue to recommend [address] [place] [address] services upon discharge to increase safety and independence in ADL tasks and functional mobility     Plan   Treatment Interventions ADL retraining;Functional transfer training;UE strengthening/ROM   Goal Expiration Date 05/03/23   OT Treatment Day 1   Recommendation   OT Discharge Recommendation Home with home health rehabilitation   AM-PAC Daily Activity Inpatient   Lower Body Dressing 3   Bathing 3   Toileting 3   Upper Body Dressing 3   Grooming 3   Eating 4   Daily Activity Raw Score 19   Daily Activity Standardized Score (Calc for Raw Score >=11) 40 22   AM-PAC Applied Cognition Inpatient   Following a Speech/Presentation 4   Understanding Ordinary Conversation 4   Taking Medications 4   Remembering Where Things Are Placed or Put Away 4   Remembering List of 4-5 Errands 4   Taking Care of Complicated Tasks 4   Applied Cognition Raw Score 24   Applied Cognition Standardized Score 62 21     The patient's raw score on the AM-PAC Daily Activity Inpatient Short Form is 19  A raw score of greater than or equal to 19 suggests the patient may benefit from discharge to home  Please refer to the recommendation of the Occupational Therapist for safe discharge planning  Pt goals to be met by 5/3/2023     1  Pt will demonstrate ability to complete LB dressing @ Mod I in order to increase safety and independence during meaningful tasks  2  Pt will demonstrate ability to rekha/doff socks/shoes while sitting EOB @ Mod I in order to increase safety and independence during meaningful tasks  3  Pt will demonstrate ability to complete toileting tasks including CM and pericare @ Mod I in order to increase safety and independence during meaningful tasks  4  Pt will demonstrate ability to complete EOB, chair, toilet/commode transfers @ Mod I in order to increase performance and participation during functional tasks  5  Pt will demonstrate ability to stand for 7-8 minutes while maintaining G balance with use of RW for UB support PRN  6  Pt will demonstrate ability to tolerate 30-35 minute OT session with no vc'ing for deep breathing or use of energy conservation techniques in order to increase activity tolerance during functional tasks  7  Pt will demonstrate Good carryover of use of energy conservation/compensatory strategies during ADLs and functional tasks in order to increase safety and reduce risk for falls  8  Pt will demonstrate Good attention and participation in continued evaluation of functional ambulation house hold distances in order to assist with safe d/c planning  9  Pt will attend to continued cognitive assessments 100% of the time in order to provide most appropriate d/c recommendations     10  Pt will follow 100% simple 2-step commands and be A&O x4 consistently with environmental cues to increase participation in functional activities  11  Pt will identify 3 areas of interest/hobbies and 1 intervention on how to incorporate into daily life in order to increase interaction with environment and peers as well as increase participation in meaningful tasks  12  Pt will demonstrate 100% carryover of BUE HEP in order to increase BUE MS and increase performance during functional tasks upon d/c home      Guillermo Naranjo OTR/VERONICA

## 2023-04-24 NOTE — PLAN OF CARE
Problem: OCCUPATIONAL THERAPY ADULT  Goal: Performs self-care activities at highest level of function for planned discharge setting  See evaluation for individualized goals  Description: Treatment Interventions: ADL retraining, Functional transfer training, UE strengthening/ROM, Endurance training, Patient/family training, Equipment evaluation/education, Neuromuscular reeducation, Compensatory technique education, Continued evaluation, Energy conservation, Activityengagement          See flowsheet documentation for full assessment, interventions and recommendations  Outcome: Progressing  Note: Limitation: Decreased ADL status, Decreased UE strength, Decreased endurance, Decreased self-care trans, Decreased high-level ADLs  Prognosis: Good  Assessment: Pt completed OT tx session #1 focused on ADL performance and functional mobility  Pt alert and agreeable to participate  Pt demonstrated improvements in transfer status and endurance this session but continues to be limited by fatigue and generalized weakness  Pt currently completing UB ADLs @ S/set-up, LB ADLs @ Min A, and functional mobility with RW @ S  Pt demonstrating Good participation and Good potential to achieve goals but is currently demonstrating deficits in decrease endurance, decrease activity tolerance, decrease standing balance, decrease performance during ADL tasks, decrease generalized strength, decrease activity engagement and decrease performance during functional transfers  Continue to recommend [address] [place] [address] services upon discharge to increase safety and independence in ADL tasks and functional mobility       OT Discharge Recommendation: Home with home health rehabilitation

## 2023-04-24 NOTE — PLAN OF CARE
Problem: PHYSICAL THERAPY ADULT  Goal: Performs mobility at highest level of function for planned discharge setting  See evaluation for individualized goals  Description: Treatment/Interventions: Functional transfer training, LE strengthening/ROM, Therapeutic exercise, Endurance training, Patient/family training, Bed mobility, Equipment eval/education, Gait training, Compensatory technique education, Spoke to nursing, OT  Equipment Recommended:  (pt owns RW)       See flowsheet documentation for full assessment, interventions and recommendations  Outcome: Progressing  Note: Prognosis: Good  Problem List: Decreased strength, Decreased endurance, Impaired balance, Decreased mobility, Impaired judgement, Decreased safety awareness, Obesity  Assessment: Pt seen for PT treatment session this date with interventions consisting of gait training w/ emphasis on improving pt's ability to ambulate level surfaces x 35 ftx1  20 ftx1 with SBA provided by therapist with RW, Therapeutic exercise consisting of: AROM 20 reps B LE in supine position and therapeutic activity consisting of training: bed mobility, supine<>sit transfers, sit<>stand transfers and toilet transfer  Pt agreeable to PT treatment session upon arrival, pt found seated OOB in recliner, in no apparent distress and responsive  In comparison to previous session, pt with improvements in distance ambulated  Post session: pt returned BTB, bed alarm engaged, all needs in reach and RN notified of session findings/recommendations  Continue to recommend home with home health rehabilitation at time of d/c in order to maximize pt's functional independence and safety w/ mobility  Pt continues to be functioning below baseline level  PT will continue to see pt during current hospitalization in order to address the deficits listed above and provide interventions consistent w/ POC in effort to achieve STGs    Barriers to Discharge: None  Barriers to Discharge Comments: Pt has family support PRN  PT Discharge Recommendation: Home with home health rehabilitation    See flowsheet documentation for full assessment

## 2023-04-24 NOTE — PHYSICAL THERAPY NOTE
PHYSICAL THERAPY TREATMENT NOTE  NAME:  Mimi Quiroga  DATE: 04/24/23    Length Of Stay: 6  Performed at least 2 patient identifiers during session: Name and ID bracelet    TREATMENT:      04/24/23 1119   PT Last Visit   PT Visit Date 04/24/23   Note Type   Note Type Treatment   Pain Assessment   Pain Assessment Tool 0-10   Pain Score No Pain   Restrictions/Precautions   Weight Bearing Precautions Per Order No   Other Precautions Chair Alarm; Bed Alarm; Fall Risk;Multiple lines   General   Chart Reviewed Yes   Family/Caregiver Present No   Cognition   Overall Cognitive Status WFL   Arousal/Participation Alert; Responsive; Cooperative   Attention Attends with cues to redirect   Orientation Level Oriented X4   Memory Within functional limits   Following Commands Follows one step commands without difficulty   Comments pt agreeable to PT session   Bed Mobility   Sit to Supine 6  Modified independent   Additional items Bedrails; Increased time required   Additional Comments pt recieved sitting OOB in recliner   Transfers   Sit to Stand 5  Supervision   Additional items Assist x 1; Armrests; Increased time required;Verbal cues   Stand to Sit 5  Supervision   Additional items Assist x 1; Increased time required;Armrests; Verbal cues   Stand pivot 5  Supervision   Additional items Assist x 1; Increased time required;Verbal cues   Toilet transfer 4  Minimal assistance   Additional items Assist x 1; Increased time required;Verbal cues;Standard toilet  (grab bar)   Additional Comments pt completed functional transfer with use of RW and S, required Shamar from low toilet  verbal cues for proper hand placement and safe technique   Ambulation/Elevation   Gait pattern Improper Weight shift;Decreased foot clearance; Wide MALKA; Short stride   Gait Assistance   (SBA)   Additional items Verbal cues   Assistive Device Rolling walker   Distance 35 ftx1, 20 ftx1   Balance   Static Sitting Normal   Dynamic Sitting Good   Static Standing Fair + Dynamic Standing Fair   Ambulatory Fair -   Endurance Deficit   Endurance Deficit Yes   Activity Tolerance   Activity Tolerance Patient limited by fatigue   Nurse Made Aware yes   Exercises   Quad Sets Supine;20 reps;AROM; Bilateral   Heelslides Supine;20 reps;AROM; Bilateral   Glute Sets Supine;20 reps;AROM; Bilateral   Hip Abduction Supine;20 reps;AROM; Bilateral   Hip Adduction Supine;20 reps;AROM; Bilateral   Knee AROM Short Arc Quad Supine;20 reps;AROM; Bilateral   Ankle Pumps Supine;20 reps;AROM; Bilateral   Assessment   Prognosis Good   Problem List Decreased strength;Decreased endurance; Impaired balance;Decreased mobility; Impaired judgement;Decreased safety awareness; Obesity   Assessment Pt seen for PT treatment session this date with interventions consisting of gait training w/ emphasis on improving pt's ability to ambulate level surfaces x 35 ftx1  20 ftx1 with SBA provided by therapist with RW, Therapeutic exercise consisting of: AROM 20 reps B LE in supine position and therapeutic activity consisting of training: bed mobility, supine<>sit transfers, sit<>stand transfers and toilet transfer  Pt agreeable to PT treatment session upon arrival, pt found seated OOB in recliner, in no apparent distress and responsive  In comparison to previous session, pt with improvements in distance ambulated  Post session: pt returned BTB, bed alarm engaged, all needs in reach and RN notified of session findings/recommendations  Continue to recommend home with home health rehabilitation at time of d/c in order to maximize pt's functional independence and safety w/ mobility  Pt continues to be functioning below baseline level  PT will continue to see pt during current hospitalization in order to address the deficits listed above and provide interventions consistent w/ POC in effort to achieve STGs     Barriers to Discharge None   Goals   Patient Goals to go home   STG Expiration Date 05/03/23   Plan   Treatment/Interventions Functional transfer training; Therapeutic exercise; Endurance training;Equipment eval/education; Bed mobility;Gait training;Spoke to nursing   Progress Progressing toward goals   PT Frequency 3-5x/wk   Recommendation   PT Discharge Recommendation Home with home health rehabilitation   AM-PAC Basic Mobility Inpatient   Turning in Flat Bed Without Bedrails 4   Lying on Back to Sitting on Edge of Flat Bed Without Bedrails 4   Moving Bed to Chair 3   Standing Up From Chair Using Arms 3   Walk in Room 3   Climb 3-5 Stairs With Railing 3   Basic Mobility Inpatient Raw Score 20   Basic Mobility Standardized Score 43 99   Highest Level Of Mobility   JH-HLM Goal 6: Walk 10 steps or more   JH-HLM Achieved 7: Walk 25 feet or more   Education   Education Provided Mobility training;Assistive device   Patient Demonstrates acceptance/verbal understanding   End of Consult   Patient Position at End of Consult Supine;Bed/Chair alarm activated; All needs within reach       The patient's AM-PAC Basic Mobility Inpatient Short Form Raw Score is 20  A Raw score of greater than 16 suggests the patient may benefit from discharge to home  Please also refer to the recommendation of the Physical Therapist for safe discharge planning        Cj Ames PTA,PTA

## 2023-04-24 NOTE — ASSESSMENT & PLAN NOTE
Wt Readings from Last 3 Encounters:   04/24/23 120 kg (264 lb 15 9 oz)   04/10/23 122 kg (267 lb 14 4 oz)   06/07/22 131 kg (289 lb)     · Is currently on spironolactone 25 mg daily and metoprolol  mg bid  · Echo 6/7/2022 with EF 40%, left ventricular moderately dilated with systolic function normal, inferior basilar wall motion abnormality, normal diastolic function  · Euvolemic at this time, monitor fluid status with addition of fluids  Gentle IVF rehydration  · Patient still appears euvolemic but having complaints of cough  IVF discontinued  Spironolactone started again  · CXR showing cardiomegaly with mild pulmonary congestion  · BNP: 2553  · Mild edema and shortness of breath on 4/23   Giving 1 dose of 40mg IV lasix  · Echo ordered  · Heart failure possibly causing cirrhosis/liver congestion  · Monitor fluid status and LFT/INR post lasix

## 2023-04-24 NOTE — ASSESSMENT & PLAN NOTE
· Presented to the ER with elevated LFTs, also elevated at recent ER visit 4/10  · Had negative hepatitis C testing in 2019  · 4/10 today was taking a lot of Tylenol, notes during this admission that she had stopped taking it because her liver enzymes were elevated    Lab Results   Component Value Date     (H) 04/24/2023     (H) 04/23/2023     (H) 04/24/2023     (H) 04/23/2023     · CT abdomen pelvis done 4/10 -slight liver surface nodularity and volume redistribution which may indicate cirrhosis, small abdominal and pelvic ascites  · Had right upper quadrant abdominal ultrasound done in the ER -hepatomegaly with lobulated hepatic contour which could indicate fibrosis/cirrhosis, possible hemangioma or focal fat on the margin of the right hepatic lobe  · Has been taking large quantities of tylenol, acetaminophen level <10  · Acute hepatitis and AFP labs normal     · Liver doppler normal  · GI: Acute mixed pattern liver enzyme elevation and is downtrending(similar presentation in 2018)  Outpatient MRI liver protocol  Can advance diet to surgical soft with low fiber  · Liver enzymes worsening on 4/23 which resulted in a worsening INR   · Possible hepatic congestion from heart failure  · Giving lasix 40mg IV once - monitor following lasix  · GI reconsult: ordered further labs to evaluate for possible DIC  ALETA negative, IgG normal, iron panel normal, fibrinogen normal, d-dimer slightly elevated     · Pending antimitochondrial antibody, anti-smooth muscle antibody, factor 8 activity, hepatitis a antibody, hepatitis b surface antibody, alpha-1 antitrypsin, ceruloplasmin

## 2023-04-25 VITALS
SYSTOLIC BLOOD PRESSURE: 122 MMHG | HEART RATE: 62 BPM | RESPIRATION RATE: 14 BRPM | BODY MASS INDEX: 45.28 KG/M2 | OXYGEN SATURATION: 95 % | DIASTOLIC BLOOD PRESSURE: 73 MMHG | HEIGHT: 64 IN | TEMPERATURE: 97.9 F | WEIGHT: 265.21 LBS

## 2023-04-25 PROBLEM — R93.89 ABNORMAL CT SCAN: Status: ACTIVE | Noted: 2023-04-25

## 2023-04-25 LAB
A1AT SERPL-MCNC: 122 MG/DL (ref 101–187)
ACTIN IGG SERPL-ACNC: 22 UNITS (ref 0–19)
ALBUMIN SERPL BCP-MCNC: 3.3 G/DL (ref 3.5–5)
ALP SERPL-CCNC: 113 U/L (ref 34–104)
ALT SERPL W P-5'-P-CCNC: 161 U/L (ref 7–52)
ANION GAP SERPL CALCULATED.3IONS-SCNC: 7 MMOL/L (ref 4–13)
AORTIC ROOT: 2.8 CM
AORTIC VALVE MEAN VELOCITY: 14 M/S
APICAL FOUR CHAMBER EJECTION FRACTION: 25 %
AST SERPL W P-5'-P-CCNC: 76 U/L (ref 13–39)
AV AREA BY CONTINUOUS VTI: 0.6 CM2
AV AREA PEAK VELOCITY: 0.7 CM2
AV LVOT MEAN GRADIENT: 1 MMHG
AV LVOT PEAK GRADIENT: 1 MMHG
AV MEAN GRADIENT: 9 MMHG
AV PEAK GRADIENT: 18 MMHG
AV REGURGITATION PRESSURE HALF TIME: 764 MS
AV VALVE AREA: 0.6 CM2
AV VELOCITY RATIO: 0.22
BILIRUB SERPL-MCNC: 1.17 MG/DL (ref 0.2–1)
BUN SERPL-MCNC: 18 MG/DL (ref 5–25)
CALCIUM ALBUM COR SERPL-MCNC: 9 MG/DL (ref 8.3–10.1)
CALCIUM SERPL-MCNC: 8.4 MG/DL (ref 8.4–10.2)
CERULOPLASMIN SERPL-MCNC: 38.8 MG/DL (ref 19–39)
CHLORIDE SERPL-SCNC: 99 MMOL/L (ref 96–108)
CO2 SERPL-SCNC: 31 MMOL/L (ref 21–32)
CREAT SERPL-MCNC: 1.06 MG/DL (ref 0.6–1.3)
DOP CALC AO PEAK VEL: 2.09 M/S
DOP CALC AO VTI: 44.24 CM
DOP CALC LVOT AREA: 3.14 CM2
DOP CALC LVOT DIAMETER: 2 CM
DOP CALC LVOT PEAK VEL VTI: 8.42 CM
DOP CALC LVOT PEAK VEL: 0.47 M/S
DOP CALC LVOT STROKE INDEX: 11.8 ML/M2
DOP CALC LVOT STROKE VOLUME: 26.44
E WAVE DECELERATION TIME: 237 MS
FRACTIONAL SHORTENING: 19 (ref 28–44)
GFR SERPL CREATININE-BSD FRML MDRD: 52 ML/MIN/1.73SQ M
GLUCOSE SERPL-MCNC: 108 MG/DL (ref 65–140)
GLUCOSE SERPL-MCNC: 120 MG/DL (ref 65–140)
GLUCOSE SERPL-MCNC: 178 MG/DL (ref 65–140)
GLUCOSE SERPL-MCNC: 98 MG/DL (ref 65–140)
INR PPP: 2.19 (ref 0.84–1.19)
INTERVENTRICULAR SEPTUM IN DIASTOLE (PARASTERNAL SHORT AXIS VIEW): 0.9 CM
INTERVENTRICULAR SEPTUM: 0.9 CM (ref 0.6–1.1)
LEFT ATRIUM SIZE: 4.3 CM
LEFT INTERNAL DIMENSION IN SYSTOLE: 5.6 CM (ref 2.1–4)
LEFT VENTRICLE DIASTOLIC VOLUME (MOD BIPLANE): 173 ML
LEFT VENTRICLE SYSTOLIC VOLUME (MOD BIPLANE): 144 ML
LEFT VENTRICULAR INTERNAL DIMENSION IN DIASTOLE: 6.9 CM (ref 3.5–6)
LEFT VENTRICULAR POSTERIOR WALL IN END DIASTOLE: 0.9 CM
LEFT VENTRICULAR STROKE VOLUME: 97 ML
LV EF: 17 %
LVSV (TEICH): 97 ML
MITOCHONDRIA M2 IGG SER-ACNC: <20 UNITS (ref 0–20)
MV E'TISSUE VEL-LAT: 5 CM/S
MV EROA: 0.1 CM2
MV PEAK A VEL: 0.4 M/S
MV PEAK E VEL: 74 CM/S
MV STENOSIS PRESSURE HALF TIME: 69 MS
MV VALVE AREA P 1/2 METHOD: 3.19
PISA MRMAX VEL: 0.33 M/S
PISA RADIUS: 0.5 CM
POTASSIUM SERPL-SCNC: 3.9 MMOL/L (ref 3.5–5.3)
PROT SERPL-MCNC: 5.5 G/DL (ref 6.4–8.4)
PROTHROMBIN TIME: 24.4 SECONDS (ref 11.6–14.5)
RA PRESSURE ESTIMATED: 10 MMHG
RV PSP: 36 MMHG
SL CV AV DECELERATION TIME RETROGRADE: 2634 MS
SL CV AV PEAK GRADIENT RETROGRADE: 39 MMHG
SL CV LV EF: 20
SL CV PED ECHO LEFT VENTRICLE DIASTOLIC VOLUME (MOD BIPLANE) 2D: 250 ML
SL CV PED ECHO LEFT VENTRICLE SYSTOLIC VOLUME (MOD BIPLANE) 2D: 153 ML
SODIUM SERPL-SCNC: 137 MMOL/L (ref 135–147)
TR MAX PG: 26 MMHG
TR PEAK VELOCITY: 2.6 M/S
TRICUSPID VALVE PEAK REGURGITATION VELOCITY: 2.57 M/S

## 2023-04-25 RX ORDER — ONDANSETRON 4 MG/1
4 TABLET, ORALLY DISINTEGRATING ORAL EVERY 6 HOURS PRN
Qty: 20 TABLET | Refills: 0 | Status: SHIPPED | OUTPATIENT
Start: 2023-04-25

## 2023-04-25 RX ORDER — WARFARIN SODIUM 2.5 MG/1
2.5 TABLET ORAL
Status: DISCONTINUED | OUTPATIENT
Start: 2023-04-25 | End: 2023-04-25 | Stop reason: HOSPADM

## 2023-04-25 RX ORDER — PANTOPRAZOLE SODIUM 40 MG/1
TABLET, DELAYED RELEASE ORAL
Qty: 104 TABLET | Refills: 0 | Status: SHIPPED | OUTPATIENT
Start: 2023-04-25 | End: 2023-07-24

## 2023-04-25 RX ORDER — WARFARIN SODIUM 5 MG/1
5 TABLET ORAL
Status: DISCONTINUED | OUTPATIENT
Start: 2023-04-26 | End: 2023-04-25 | Stop reason: HOSPADM

## 2023-04-25 RX ORDER — DOCUSATE SODIUM 100 MG/1
100 CAPSULE, LIQUID FILLED ORAL 2 TIMES DAILY
Qty: 60 CAPSULE | Refills: 0 | Status: SHIPPED | OUTPATIENT
Start: 2023-04-25

## 2023-04-25 RX ADMIN — PANTOPRAZOLE SODIUM 40 MG: 40 INJECTION, POWDER, FOR SOLUTION INTRAVENOUS at 08:41

## 2023-04-25 RX ADMIN — VENLAFAXINE HYDROCHLORIDE 150 MG: 150 CAPSULE, EXTENDED RELEASE ORAL at 08:38

## 2023-04-25 RX ADMIN — DOCUSATE SODIUM 100 MG: 100 CAPSULE ORAL at 17:50

## 2023-04-25 RX ADMIN — NYSTATIN: 100000 POWDER TOPICAL at 08:40

## 2023-04-25 RX ADMIN — INSULIN LISPRO 2 UNITS: 100 INJECTION, SOLUTION INTRAVENOUS; SUBCUTANEOUS at 11:30

## 2023-04-25 RX ADMIN — LEVOTHYROXINE SODIUM 175 MCG: 175 TABLET ORAL at 05:00

## 2023-04-25 RX ADMIN — METOPROLOL SUCCINATE 100 MG: 100 TABLET, EXTENDED RELEASE ORAL at 10:19

## 2023-04-25 RX ADMIN — POLYETHYLENE GLYCOL 3350 17 G: 17 POWDER, FOR SOLUTION ORAL at 08:41

## 2023-04-25 RX ADMIN — DOCUSATE SODIUM 100 MG: 100 CAPSULE ORAL at 08:38

## 2023-04-25 RX ADMIN — SPIRONOLACTONE 25 MG: 25 TABLET ORAL at 08:38

## 2023-04-25 RX ADMIN — NYSTATIN: 100000 POWDER TOPICAL at 17:50

## 2023-04-25 RX ADMIN — WARFARIN SODIUM 2.5 MG: 2.5 TABLET ORAL at 17:49

## 2023-04-25 NOTE — ASSESSMENT & PLAN NOTE
· CT scan done on 4/10 from previous ED visit:   · 1) Collapsed urinary bladder limiting evaluation with perivesical stranding which may indicate cystitis  · 2) No bowel obstruction  Normal appendix  Evaluation for bowel inflammation somewhat limited by underdistention and lack of oral contrast, however no convincing inflammatory changes  Please note mild inflammation may not be apparent  · 3) Slight liver surface nodularity and volume redistribution, which may indicate cirrhosis  · 4) Small abdominal and pelvic ascites  No organized collections to suggest an abscess  · 5) Partially imaged, lobulated, sclerotic lesion in the medullary cavity of the right femoral diaphysis of unclear etiology  Recommend further evaluation with radiographs of the right femur to assess full extent of the lesion  · 6) 3 subcentimeter mildly sclerotic lesions in the right side of the sacrum and right proximal femur, which are also indeterminate  These may represent bone islands, however metastatic disease is not excluded  · 7) Mild subcutaneous tissue edema in the supraumbilical ventral abdominal wall, which may be related to cellulitis  No organized collections to suggest an abscess  · 8) 3 3 x 2 4 cm cystic lesion in the left ovary  Further evaluation with nonemergent pelvic ultrasound is recommended  · Discussed these findings from previous ED visit with patient and she verbalized understanding  Follow up recommended was written on discharge paperwork for patient

## 2023-04-25 NOTE — ASSESSMENT & PLAN NOTE
Lab Results   Component Value Date    HGBA1C 6 8 (H) 04/18/2023       Recent Labs     04/24/23  1638 04/24/23  2057 04/25/23  0735 04/25/23  1108   POCGLU 148* 182* 98 178*       Blood Sugar Average: Last 72 hrs:  · (P) 596 [id number]   · Currently as an outpatient seems to be on metformin, not checking her blood pressures at home per last PCP note  · Would hold metformin given nausea vomiting  · Start on correctional scale with meals, watch for hypoglycemia given poor oral intake  · Continue metformin on discharge, follow up with PCP

## 2023-04-25 NOTE — ASSESSMENT & PLAN NOTE
· Patient on coumadin for a fib  She took her last dose on Monday  Had supratherapeutic INR on 4/18  Repeat on 4/19 still elevated  · Holding coumadin at this time  · GI: Do not see a need to reverse given there is no emergent need in performing EGD  Continue to monitor  · Midline placed due to difficulty obtaining labs  · Suspect INR levels increasing due to worsening liver enzymes, INR decreased today as did liver enzymes  · Monitor with morning INR levels  · GI ordering other labs, see under elevated LFTs   · Patient back within therapeutic range, continue with home coumadin dosing and repeat PT-INR tomorrow and in 3 days  Follow up with cardiology in 1 week       Lab Results   Component Value Date    INR 2 19 (H) 04/25/2023    INR 3 19 (H) 04/24/2023    INR 4 53 (H) 04/23/2023

## 2023-04-25 NOTE — DISCHARGE SUMMARY
[address]  Discharge- Rich Odom 1950, 67 y o  female MRN: [id number]  Unit/Bed#: -01 Encounter: [id number]  Primary Care Provider: Harish Woody MD   Date and time admitted to hospital: 4/18/2023 11:05 AM    * Nausea and vomiting  Assessment & Plan  · Patient presents to the ER with 3-week history of nausea vomiting and weakness  Notes this started after increasing her metoprolol? She states that she did have h  Pylori in the past  No history of liver disease  · Unclear etiology  · Electrolytes on admission within normal limits-continue to monitor  · Trial of antiemetics  · IV protonix 40 mg bid  · GI consult: plan for possible no need for EGD as patient is tolerating diet with no more nausea or vomiting  May trial food for today  Follow with Dr Ralf Hansen outpatient  · Discontinue IVF due to CHF history, monitor fluid status and add fluids if further vomiting  · No further events of nausea or vomiting  · Diabetic diet  · Follow up with GI outpatient  Continue with protonix 40 mg bid x 2 weeks then once daily  Zofran prn on discharge     Resolved    Elevated LFTs  Assessment & Plan  · Presented to the ER with elevated LFTs, also elevated at recent ER visit 4/10  · Had negative hepatitis C testing in 2019  · 4/10 today was taking a lot of Tylenol, notes during this admission that she had stopped taking it because her liver enzymes were elevated    Lab Results   Component Value Date    AST 76 (H) 04/25/2023     (H) 04/24/2023     (H) 04/25/2023     (H) 04/24/2023     · CT abdomen pelvis done 4/10 -slight liver surface nodularity and volume redistribution which may indicate cirrhosis, small abdominal and pelvic ascites  · Had right upper quadrant abdominal ultrasound done in the ER -hepatomegaly with lobulated hepatic contour which could indicate fibrosis/cirrhosis, possible hemangioma or focal fat on the margin of the right hepatic lobe  · Has been taking large quantities of tylenol, acetaminophen level <10  · Acute hepatitis and AFP labs normal     · Liver doppler normal  · GI: Acute mixed pattern liver enzyme elevation and is downtrending(similar presentation in 2018)  Outpatient MRI liver protocol  Can advance diet to surgical soft with low fiber  · Liver enzymes worsening on 4/23 which resulted in a worsening INR   · Possible hepatic congestion from heart failure  · Giving lasix 40mg IV once - monitor following lasix  · GI reconsult: ordered further labs to evaluate for possible DIC  ALETA negative, IgG normal, iron panel normal, fibrinogen normal, d-dimer slightly elevated  Ceruloplasmin, alpha-1-antitrypsin, hepatitis B surface antibody, hepatitis A antibody normal  · Pending antimitochondrial antibody, anti-smooth muscle antibody, factor 8 activity  · Follow up with GI and hepatology outpatient  Referrals placed  GI suspecting this could be related to CHF  See GI note for further discussion  Abnormal CT scan  Assessment & Plan  · CT scan done on 4/10 from previous ED visit:   · 1) Collapsed urinary bladder limiting evaluation with perivesical stranding which may indicate cystitis  · 2) No bowel obstruction  Normal appendix  Evaluation for bowel inflammation somewhat limited by underdistention and lack of oral contrast, however no convincing inflammatory changes  Please note mild inflammation may not be apparent  · 3) Slight liver surface nodularity and volume redistribution, which may indicate cirrhosis  · 4) Small abdominal and pelvic ascites  No organized collections to suggest an abscess  · 5) Partially imaged, lobulated, sclerotic lesion in the medullary cavity of the right femoral diaphysis of unclear etiology  Recommend further evaluation with radiographs of the right femur to assess full extent of the lesion    · 6) 3 subcentimeter mildly sclerotic lesions in the right side of the sacrum and right proximal femur, which are also indeterminate  These may represent bone islands, however metastatic disease is not excluded  · 7) Mild subcutaneous tissue edema in the supraumbilical ventral abdominal wall, which may be related to cellulitis  No organized collections to suggest an abscess  · 8) 3 3 x 2 4 cm cystic lesion in the left ovary  Further evaluation with nonemergent pelvic ultrasound is recommended  · Discussed these findings from previous ED visit with patient and she verbalized understanding  Follow up recommended was written on discharge paperwork for patient  Supratherapeutic INR  Assessment & Plan  · Patient on coumadin for a fib  She took her last dose on Monday  Had supratherapeutic INR on 4/18  Repeat on 4/19 still elevated  · Holding coumadin at this time  · GI: Do not see a need to reverse given there is no emergent need in performing EGD  Continue to monitor  · Midline placed due to difficulty obtaining labs  · Suspect INR levels increasing due to worsening liver enzymes, INR decreased today as did liver enzymes  · Monitor with morning INR levels  · GI ordering other labs, see under elevated LFTs   · Patient back within therapeutic range, continue with home coumadin dosing and repeat PT-INR tomorrow and in 3 days  Follow up with cardiology in 1 week  Lab Results   Component Value Date    INR 2 19 (H) 04/25/2023    INR 3 19 (H) 04/24/2023    INR 4 53 (H) 04/23/2023       Anxiety and depression  Assessment & Plan  · Currently taking effexor 150 mg outpatient  · Continue    Atrial fibrillation (HCC)  Assessment & Plan  · Currently taking metoprolol  mg bid outpatient     · ECG in NSR  · Monitor    Type 2 diabetes mellitus, without long-term current use of insulin [facility])  Assessment & Plan  Lab Results   Component Value Date    HGBA1C 6 8 (H) 04/18/2023       Recent Labs     04/24/23  1638 04/24/23  2057 04/25/23  0735 04/25/23  1108   POCGLU 148* 182* 98 178*       Blood Sugar Average: Last 72 hrs:  · (P) 066 [id number]   · Currently as an outpatient seems to be on metformin, not checking her blood pressures at home per last PCP note  · Would hold metformin given nausea vomiting  · Start on correctional scale with meals, watch for hypoglycemia given poor oral intake  · Continue metformin on discharge, follow up with PCP    Combined systolic and diastolic congestive heart failure ([facility] [address] )  Assessment & Plan  Wt Readings from Last 3 Encounters:   04/25/23 120 kg (265 lb 3 4 oz)   04/10/23 122 kg (267 lb 14 4 oz)   06/07/22 131 kg (289 lb)     · Is currently on spironolactone 25 mg daily and metoprolol  mg bid  · Echo 6/7/2022 with EF 40%, left ventricular moderately dilated with systolic function normal, inferior basilar wall motion abnormality, normal diastolic function  · Euvolemic at this time, monitor fluid status with addition of fluids  Gentle IVF rehydration  · Patient still appears euvolemic but having complaints of cough  IVF discontinued  Spironolactone started again  · CXR showing cardiomegaly with mild pulmonary congestion  · BNP: 2553  · Mild edema and shortness of breath on 4/23  Giving 1 dose of 40mg IV lasix  · Echo: Wall thickness is normal  The left ventricular ejection fraction is 20%  Systolic function is severely reduced  Wall motion cannot be accurately assessed  Left atrial filling pressure is elevated  Indeterminate diastolic function   · Discussed with cardiology, this is not a new EF for patient and she has an ICD in place  She should continue to follow up with cardiology as previously instructed  · Heart failure possibly causing cirrhosis/liver congestion  · Monitor fluid status and LFT/INR post lasix  · LFT/INR improved, euvolemic on examination, continue home dose of aldactone and follow up with cardiology outpatient in 1 week       Disease of thyroid gland  Assessment & Plan  · Maintained on levothyroxine 175 mcg tablet  · Had recent TSH/T4 checked as an outpatient-TSH was low but T4 normal    Medical Problems     Resolved Problems  Date Reviewed: 4/25/2023   None       Discharging Physician / Practitioner: Felicia Castillo PA-C  PCP: Hayley Bernal MD  Admission Date:   Admission Orders (From admission, onward)     Ordered        04/18/23 [address]  Once                      Discharge Date: 04/25/23    Consultations During Hospital Stay:  · GI, PT/OT    Procedures Performed:   · Midline insertion on 4/21    Significant Findings / Test Results:   · RUQ U/S: PANCREAS:  Portions of the pancreas are obscured by bowel gas  Visualized portions of the pancreas are unremarkable  AORTA AND IVC:  Visualized portions are normal for patient age  LIVER: Size:  Enlarged  The liver measures 21 1 cm in the midclavicular line  Contour:  Surface contour is lobulated  Parenchyma:  Echogenicity and echotexture are within normal limits  Echogenic area at the margin of the right hepatic lobe at the [place] hepatis measuring 2 2 x 2 2 x 1 3 cm is nonspecific and could be due to a hemangioma or focal fat  Given suggestion of underlying liver disease, this can be further evaluated with  nonemergent contrast-enhanced MR abdomen to exclude a mass  Limited imaging of the main portal vein shows it to be patent and hepatopetal  BILIARY: Patient has undergone cholecystectomy  No intrahepatic biliary dilatation  CBD measures 5 0 mm  No choledocholithiasis  KIDNEY:  Right kidney measures 10 0 x 5 0 x 5 2 cm  Volume 134 7 mL  Kidney within normal limits  ASCITES:   Small volume perihepatic ascites  IMPRESSION: 1  Hepatomegaly  Lobulated hepatic contour may indicate underlying fibrosis/cirrhosis  Correlation can be made with liver function parameters and ultrasound elastography  2   Echogenic area at the margin of the right hepatic lobe at the [place] hepatis measuring 2 2 x 2 2 x 1 3 cm is nonspecific and could be due to a hemangioma or focal fat   Given suggestion of underlying liver disease, this can be further evaluated with nonemergent contrast-enhanced MR abdomen to exclude a mass  3   Small volume perihepatic ascites  · XR chest: Cardiomegaly with mild pulmonary vascular congestion  · US liver doppler: LIVER DOPPLER: The main portal vein and primary branch segments are patent and hepatopetal with normal spectral waveform  Hepatic veins are patent  Flow is above and below baseline which may be technical   Color flow does show hepatopedal direction  Spectral waveforms within normal limits  Main hepatic artery appears normal size, patent with normal spectral waveform  IMPRESSION: There is hepatopedal flow in the main portal vein  · Echo:  Left Ventricle: Left ventricular cavity size is dilated  Wall thickness is normal  The left ventricular ejection fraction is 20%  Systolic function is severely reduced  Wall motion cannot be accurately assessed  Left atrial filling pressure is elevated  Indeterminate diastolic function  Right Ventricle: Right ventricle is not well visualized  Systolic function appears reduced on limited views  Left Atrium: The atrium is dilated  Aortic Valve: The leaflets are moderately thickened  The leaflets are moderately calcified  There is mild regurgitation  There is severe stenosis based on DVI  There is likely low flow/low gradient aortic stenosis  AV peak velocity 2 1 m/s  AV mean gradient 9 mmHg  DVI 0  19  Mitral Valve: There is mild calcification  The leaflets exhibit normal mobility  There is moderate regurgitation  There is no evidence of stenosis  Tricuspid Valve: Tricuspid valve structure is normal  There is moderate regurgitation  There is no evidence of stenosis  The right ventricular systolic pressure is mildly elevated  The estimated right ventricular systolic pressure is 01 24 mmHg  Pulmonic Valve: The pulmonic valve was not well visualized  There is mild regurgitation  There is no evidence of stenosis  Aorta: The aortic root is normal in size   The aortic root is 2 80 cm  Prior TTE study available for comparison  Prior study date: 2022  Changes noted when compared to prior study  Changes include: EF previously 40% with moderate aortic stenosis  No aortic regurgitation seen on prior study  Mild to moderate MR on prior study  No TR reported previously  Previous DVI 0 33 consistent with moderate aortic stenosis  Lili Strong · INR: 5 84  · AST: 230, ALT: 254, alk phos: 133, total bilirubin: 2 06  · Ceruloplasmin: 38 8  · Hepatitis A ab: non-reactive  · Hepatitis B surface antibody: <3 00  · Alpha-1-antitrypsin: 122  · ALETA: negative  · Ig  · Iron panel normal  · D-dimer: 0 64  · Fibrinogen: 357  · Total CK: 32  · Magnesium: 1 6  · BNP: 2553  · AFP: 3 4  · Lactic: 2 1  · Hepatitis panel acute normal  · Acetaminophen: <10  · A1c: 6 8    Incidental Findings:   · See above   · I reviewed the above mentioned incidental findings with the patient and/or family and they expressed understanding  These were found on prior ER visit, but discussed with patient to follow up with patient's PCP regarding these findings  Test Results Pending at Discharge (will require follow up): · Antimitochondrial antibody  · Anti-smooth muscle antibody, IgG  · Factor 8 activity     Outpatient Tests Requested:  · Repeat CMP in 1 week  · Repeat PT-INR tomorrow and in 3 days  · Follow up with PCP in 1 week  · Discuss further imaging of the right femur and pelvis due to sclerotic lesion   Discuss pelvic ultrasound due to ovarian cyst  · Follow up with cardiology in 1 week  · Follow up with hepatology  · Follow up with gastroenterology   · Discuss if need for outpatient MRI of the liver    Complications:  none    Reason for Admission: nausea and vomiting    Hospital Course:   Rich Odom is a pleasant 67 y o  female patient with PMH of hypothyroidism, type 2 diabetes mellitus, anxiety and depression, combined systolic and diastolic CHF, atrial fibrillation on Coumadin who originally presented to the hospital on 4/18/2023 due to nausea and vomiting x 1 month  Noted to have supratherapeutic INR in the ED and coumadin was held  Also with elevated LFTs in the ED and GI consulted  Patient initially made n p o  in case GI was going to do endoscopy however due to elevated INR, endoscopy was not done  GI recommending to monitor INR and do not see emergent need for EGD, trial of clear liquid diet, check liver Doppler, AFP  Recommended to continue PPI and outpatient MRI  Patient's LFTs and INR initially continued to improve while she was in the hospital   Was started on gentle IV fluid rehydration due to significant nausea and vomiting  This was discontinued due to CHF history and tolerating a oral diet  Patient with complaints of mild cough to GI and a chest x-ray was ordered which showed mild vascular congestion  Midline was placed on 4/21 due to difficulty with obtaining labs  Patient had worsening of liver enzymes on 4/23 along with increase in INR to 4 53  GI was reconsulted and patient was given 1 dose of IV Lasix 40 mg   Echo was ordered as well with results as above  GI recommended further labs to evaluate for possible DIC with results as above  Due to EF of 20% seen on echo, discussed with on-call cardiology and patient's cardiology provider Kathy Amaya and recommended no acute intervention due to chronic history of low EF and current ICD placement  Patient was not fluid overloaded and recommended that she may continue to follow-up outpatient  Patient's INR normalized and was 2 19 on day of discharge  Patient was instructed to start taking her Coumadin as previously instructed  She should have repeat INR tomorrow and another in 3 days  Patient is to continue to follow-up with cardiology regarding her INR and her heart failure  Recommend follow-up in 1 week  GI stating that labs are not consistent with DIC and due to acute improvement in INR did not see the need for vitamin K challenge  Does not believe patient has cirrhosis however if patient does have cirrhosis PT and INR is not reliable to ascertain true coagulated state  Due to elevated liver enzymes improving with diuresis, suspect that significant worsening of heart failure was causing congestive hepatopathy leading to hepatic dysfunction without evidence of chronicity  GI recommending patient continue with PPI twice daily for 2 weeks then reduce to once daily  She should have outpatient MRI done, follow up with hepatology and GI - referrals placed  Continue coumadin on discharge  Should have close follow up with cardiology  Repeat CMP in 1 week, repeat PT-INR in 1 day and 3 days  Follow up with PCP in 1 week  Follow up with cardiology in 1 week  Follow up with hepatology and GI  During previous ED visit, patient noted to have sclerotic lesion on right femur and sacrum  Discussed with patient she should follow up with her PCP to discuss further workup of these lesions  Also noted on this CT scan that she had a cyst on the ovary and to follow up outpatient for nonemergent ultrasound  Patient understands and will follow up with her PCP regarding this  She was aware from previous ED visit, but appreciated the reminder  PT/OT evaluated patient and recommended home with home health  CM set this up prior to discharge  Please see above list of diagnoses and related plan for additional information  Condition at Discharge: stable    Discharge Day Visit / Exam:   Subjective:  Patient seen and examined at bedside this morning  She is feeling good and offers no acute complaints  She denies chest pain, SOB, nausea, vomiting, diarrhea, constipation, abdominal pain, fever, chills  She would like to go home and is tolerating her diet without difficulty      Vitals: Blood Pressure: 122/73 (04/25/23 1534)  Pulse: 62 (04/25/23 1534)  Temperature: 97 9 °F (36 6 °C) (04/25/23 1534)  Temp Source: Temporal (04/23/23 1351)  Respirations: 14 (04/25/23 "1534)  Height: 5' 4\" (162 6 cm) (04/24/23 1505)  Weight - Scale: 120 kg (265 lb 3 4 oz) (04/25/23 0750)  SpO2: 95 % (04/25/23 1534)  Exam:   Physical Exam  Vitals reviewed  Constitutional:       General: She is not in acute distress  Appearance: She is obese  She is ill-appearing (chronically)  She is not toxic-appearing  HENT:      Head: Normocephalic and atraumatic  Nose: Nose normal       Mouth/Throat:      Mouth: Mucous membranes are moist    Eyes:      Pupils: Pupils are equal, round, and reactive to light  Cardiovascular:      Rate and Rhythm: Normal rate and regular rhythm  Heart sounds: Normal heart sounds  Pulmonary:      Effort: Pulmonary effort is normal  No respiratory distress  Breath sounds: Normal breath sounds  No stridor  No wheezing  Abdominal:      General: Bowel sounds are normal  There is no distension  Palpations: Abdomen is soft  There is no mass  Tenderness: There is no abdominal tenderness  Musculoskeletal:         General: Normal range of motion  Cervical back: Normal range of motion  Right lower leg: No edema  Left lower leg: No edema  Skin:     General: Skin is warm and dry  Neurological:      General: No focal deficit present  Mental Status: She is alert and oriented to person, place, and time  Psychiatric:         Mood and Affect: Mood normal          Behavior: Behavior normal           Discussion with Family: Updated  (daughter) via phone  Discharge instructions/Information to patient and family:   See after visit summary for information provided to patient and family  Provisions for Follow-Up Care:  See after visit summary for information related to follow-up care and any pertinent home health orders  Disposition:   Home with [facility] Services (Reminder: Complete face to face encounter)    Planned Readmission: No     Discharge Statement:  I spent 55 minutes discharging the patient   This time was " spent on the day of discharge  I had direct contact with the patient on the day of discharge  Greater than 50% of the total time was spent examining patient, answering all patient questions, arranging and discussing plan of care with patient as well as directly providing post-discharge instructions  Additional time then spent on discharge activities  Discharge Medications:  See after visit summary for reconciled discharge medications provided to patient and/or family        **Please Note: This note may have been constructed using a voice recognition system**

## 2023-04-25 NOTE — PROGRESS NOTES
[facility]  Progress Note - Chris Laird 67 y o  female MRN: [id number]    Unit/Bed#: -01 Encounter: [id number]    Assessment/Plan:  Nausea and vomiting  Abdominal pain  Elevated liver enzymes  Abnormal CT of the liver  Elevated INR  -Acute mixed pattern liver enzyme elevation and had been oscillating over the past week and now downtrending again  -Negative acute hepatitis panel, COVID negative  Labs not consistent with Raj's disease, alpha-1 antitrypsin deficiency, hemochromatosis, autoimmune hepatitis  -AMA still pending to rule out PBC  -She is status post cholecystectomy  -No immunity to hepatitis A or hepatitis B  -Platelet count greater than 150, normal albumin without stereotypical AST to ALT ratio of 2:1 making cirrhosis or significant portal hypertension less likely despite abnormal looking ultrasound  -Ultrasound with normal echotexture but lobulated contour with 2 cm lesion, AFP nml  -Liver Dopplers show patent hepatic vasculature  -Updated echocardiogram shows significant worsening of her ejection fraction to 20% the setting of elevated BNP and partial worsening of congestion on chest x-ray  -Her upper GI symptoms have mainly resolved and is tolerating 75 to 100% of her diet  -Factor VIII levels are pending(expect to be normal or elevated in cirrhosis and low in DIC)  -Labs are not consistent with DIC  -Given improvement in INR do not see need for vitamin K challenge  -If patient truly does have cirrhosis INR/PT is not reliable to ascertain true coagulated state may benefit from TEG although currently do not see a need for need for transfusion of any products  -Given clear trend of elevated liver enzymes improving with diuresis with significant worsening of heart failure suspect that this is congestive hepatopathy may be causing transient hepatic dysfunction without evidence of chronicity  -Her INR may have been elevated due to a relative vitamin K deficiency from her "preadmission poor oral intake due to nausea and vomiting chest is now improved/resolved  -Differential diagnosis includes self-limiting viral gastroenteritis, PUD, persistent H  pylori, gastroparesis, less likely HCC low probability for clinically significant portal hypertension in the setting of normal platelets, however metastatic lesion is also in consideration given sclerotic lesions seen on CT     Plan:  -Recommend cardiology evaluation for significant worsening of her EF on updated echocardiogram  -Continue PPI twice daily for 2 weeks then reduce to once daily  -Continue diet as tolerated  -No planned endoscopic intervention at this time given resolution of most of her upper GI symptoms and significant decline in her EF  -Outpatient MRI liver protocol  -Further evaluation for her sclerotic lesions to assess for any type of metastatic disease  -Outpatient hematology evaluation-if INR continues to be difficult to control  -Outpatient hepatology evaluation  -No contraindication in resuming warfarin if clinically indicated  -Antiemetics as needed  -Continue to trend hepatic enzymes  -Continue to trend INR    Subjective:   Patient seen and examined  Tolerating diet  Denies any abdominal pain  Objective:     Vitals: Blood pressure 118/62, pulse 59, temperature (!) 97 3 °F (36 3 °C), resp  rate 17, height 5' 4\" (1 626 m), weight 120 kg (265 lb 3 4 oz), SpO2 93 %  ,Body mass index is 45 52 kg/m²  Intake/Output Summary (Last 24 hours) at 4/25/2023 1348  Last data filed at 4/25/2023 0830  Gross per 24 hour   Intake 420 ml   Output --   Net 420 ml       Review of Systems: as per HPI  Review of Systems    Physical Exam:     Physical Exam  Constitutional:       General: She is in acute distress  Appearance: She is obese  She is ill-appearing  HENT:      Head: Normocephalic  Eyes:      General: No scleral icterus  Cardiovascular:      Rate and Rhythm: Normal rate     Pulmonary:      Effort: Pulmonary " effort is normal    Abdominal:      General: There is no distension  Palpations: Abdomen is soft  Tenderness: There is no abdominal tenderness  There is no guarding  Musculoskeletal:      Cervical back: Neck supple  Skin:     Coloration: Skin is not jaundiced  Neurological:      Mental Status: She is alert and oriented to person, place, and time        Comments: No asterixis   Psychiatric:         Mood and Affect: Mood normal            Invasive Devices     Peripheral Intravenous Line  Duration           Long-Dwell Peripheral IV (Midline) 04/21/23 Left 4 days          Drain  Duration           External Urinary Catheter 2 days                        CBC: No results found for: WBC, HGB, HCT, MCV, PLT, ADJUSTEDWBC, MCH, MCHC, RDW, MPV, NRBC,   CMP:   Lab Results   Component Value Date    K 3 9 04/25/2023    CL 99 04/25/2023    CO2 31 04/25/2023    BUN 18 04/25/2023    CREATININE 1 06 04/25/2023    CALCIUM 8 4 04/25/2023    AST 76 (H) 04/25/2023     (H) 04/25/2023    ALKPHOS 113 (H) 04/25/2023    EGFR 52 04/25/2023   ,   Lipase: No results found for: LIPASE,  PT/INR:   Lab Results   Component Value Date    INR 2 19 (H) 04/25/2023

## 2023-04-25 NOTE — DISCHARGE INSTR - AVS FIRST PAGE
Dear Denton Mack,     It was our pleasure to care for you here at [facility]  For follow up as well as any medication refills, we recommend that you follow up with your primary care physician  Here are the most important instructions/ recommendations at discharge:     Notable Medication Adjustments -   Continue taking coumadin as previously instructed with 2 5 mg on Tuesday and Thursday and 5 mg on Sunday, Monday, Wednesday, Friday, Saturday  Take your dose of 2 5 mg tonight as you did not have this while in the hospital    Continue taking protonix 40 mg one tablet by mouth twice daily for 2 weeks then reduce to once daily  Testing Required after Discharge -   Repeat PT-INR tomorrow and in 3 days  Repeat CMP in 1 week  Discuss with your PCP, gastroenterology, hepatology regarding outpatient MRI of liver if needed  Important follow up information -   Follow up with PCP in 1 week  Please discuss with your PCP CT scan findings of sclerotic bone lesions  Recommending further imaging outpatient with XR of the right femur and pelvis  Also found cyst on ovary, please discuss nonemergent pelvic ultrasound  Follow up with cardiology in 1 week to discuss your INR and heart failure history  Follow up with GI outpatient - referral placed for you  Follow up with hepatologist - referral placed for you  Other Instructions -   Please continue to take medications as prescribed and continue follow up as previously instructed  Please return to the ER if you begin having worsening nausea and vomiting, abdominal pain, chest pain, shortness of breath  There was a cyst on the left ovary, recommending further evaluation with nonemergent pelvic ultrasound  Discussed that there were some sclerotic lesions noted on the right femur and sacrum: Recommend further evaluation with radiographs of the right femur and sacrum to assess full extent of the lesion with your PCP   Please discuss this with them at your follow up appointment  Recommend following up in 1 week  Please review this entire after visit summary as additional general instructions including medication list, appointments, activity, diet, any pertinent wound care, and other additional recommendations from your care team that may be provided for you      Sincerely,     Lc Blanco PA-C

## 2023-04-25 NOTE — ASSESSMENT & PLAN NOTE
Wt Readings from Last 3 Encounters:   04/25/23 120 kg (265 lb 3 4 oz)   04/10/23 122 kg (267 lb 14 4 oz)   06/07/22 131 kg (289 lb)     · Is currently on spironolactone 25 mg daily and metoprolol  mg bid  · Echo 6/7/2022 with EF 40%, left ventricular moderately dilated with systolic function normal, inferior basilar wall motion abnormality, normal diastolic function  · Euvolemic at this time, monitor fluid status with addition of fluids  Gentle IVF rehydration  · Patient still appears euvolemic but having complaints of cough  IVF discontinued  Spironolactone started again  · CXR showing cardiomegaly with mild pulmonary congestion  · BNP: 2553  · Mild edema and shortness of breath on 4/23  Giving 1 dose of 40mg IV lasix  · Echo: Wall thickness is normal  The left ventricular ejection fraction is 20%  Systolic function is severely reduced  Wall motion cannot be accurately assessed  Left atrial filling pressure is elevated  Indeterminate diastolic function   · Discussed with cardiology, this is not a new EF for patient and she has an ICD in place  She should continue to follow up with cardiology as previously instructed  · Heart failure possibly causing cirrhosis/liver congestion  · Monitor fluid status and LFT/INR post lasix  · LFT/INR improved, euvolemic on examination, continue home dose of aldactone and follow up with cardiology outpatient in 1 week

## 2023-04-25 NOTE — ASSESSMENT & PLAN NOTE
· Presented to the ER with elevated LFTs, also elevated at recent ER visit 4/10  · Had negative hepatitis C testing in 2019  · 4/10 today was taking a lot of Tylenol, notes during this admission that she had stopped taking it because her liver enzymes were elevated    Lab Results   Component Value Date    AST 76 (H) 04/25/2023     (H) 04/24/2023     (H) 04/25/2023     (H) 04/24/2023     · CT abdomen pelvis done 4/10 -slight liver surface nodularity and volume redistribution which may indicate cirrhosis, small abdominal and pelvic ascites  · Had right upper quadrant abdominal ultrasound done in the ER -hepatomegaly with lobulated hepatic contour which could indicate fibrosis/cirrhosis, possible hemangioma or focal fat on the margin of the right hepatic lobe  · Has been taking large quantities of tylenol, acetaminophen level <10  · Acute hepatitis and AFP labs normal     · Liver doppler normal  · GI: Acute mixed pattern liver enzyme elevation and is downtrending(similar presentation in 2018)  Outpatient MRI liver protocol  Can advance diet to surgical soft with low fiber  · Liver enzymes worsening on 4/23 which resulted in a worsening INR   · Possible hepatic congestion from heart failure  · Giving lasix 40mg IV once - monitor following lasix  · GI reconsult: ordered further labs to evaluate for possible DIC  ALETA negative, IgG normal, iron panel normal, fibrinogen normal, d-dimer slightly elevated  Ceruloplasmin, alpha-1-antitrypsin, hepatitis B surface antibody, hepatitis A antibody normal  · Pending antimitochondrial antibody, anti-smooth muscle antibody, factor 8 activity  · Follow up with GI and hepatology outpatient  Referrals placed  GI suspecting this could be related to CHF  See GI note for further discussion

## 2023-04-25 NOTE — ASSESSMENT & PLAN NOTE
· Patient presents to the ER with 3-week history of nausea vomiting and weakness  Notes this started after increasing her metoprolol? She states that she did have h  Pylori in the past  No history of liver disease  · Unclear etiology  · Electrolytes on admission within normal limits-continue to monitor  · Trial of antiemetics  · IV protonix 40 mg bid  · GI consult: plan for possible no need for EGD as patient is tolerating diet with no more nausea or vomiting  May trial food for today  Follow with Dr Morris Barrow outpatient  · Discontinue IVF due to CHF history, monitor fluid status and add fluids if further vomiting  · No further events of nausea or vomiting  · Diabetic diet  · Follow up with GI outpatient  Continue with protonix 40 mg bid x 2 weeks then once daily  Zofran prn on discharge     Resolved

## 2023-04-25 NOTE — NURSING NOTE
Patients midline removed prior to discharge  Patient has all personal belongings  Discussed patients discharge instructions with patient and daughter  Both verbalized understanding

## 2023-04-25 NOTE — CASE MANAGEMENT
Case Management Discharge Planning Note    Patient name Vernal Basket  Location [facility] [id number] MRN [id number]  : 1950 Date 2023       Current Admission Date: 2023  Current Admission Diagnosis:Nausea and vomiting   Patient Active Problem List    Diagnosis Date Noted   • Supratherapeutic INR 2023   • Disease of thyroid gland    • Combined systolic and diastolic congestive heart failure (HCC)    • Type 2 diabetes mellitus, without long-term current use of insulin (HCC)    • Atrial fibrillation (HCC)    • Anxiety and depression    • Nausea and vomiting    • Elevated LFTs       LOS (days): 7  Geometric Mean LOS (GMLOS) (days): 2 50  Days to GMLOS:-4 4     OBJECTIVE:  Risk of Unplanned Readmission Score: 12 57         Current admission status: Inpatient   Preferred Pharmacy:   [address] [place] [address]  Phone: [phone] Fax: [phone]    Primary Care Provider: Gaetano Jauregui MD    Primary Insurance: MEDICARE  Secondary Insurance:     DISCHARGE DETAILS:      CM was unaware of discharge today  CM spoke to patient at the bedside, reviewed DC IMM with patient and informed that patient can stay an additional 4 hours for reconsidering appealing the discharge as the medicare rights were review on the day of discharge  Pt verbalized understanding and feels ready to go home and does not intend to stay 4 hours to reconsider                                                                                        IMM Given (Date):: 23  IMM Given to[de-identified] Patient

## 2023-04-26 LAB — FACT XIIIA PPP-ACNC: 219 % (ref 56–140)

## 2023-04-28 ENCOUNTER — HOSPITAL ENCOUNTER (EMERGENCY)
Facility: HOSPITAL | Age: 73
Discharge: HOME | End: 2023-04-28
Attending: EMERGENCY MEDICINE

## 2023-04-28 VITALS
OXYGEN SATURATION: 98 % | WEIGHT: 265.21 LBS | HEART RATE: 70 BPM | RESPIRATION RATE: 18 BRPM | DIASTOLIC BLOOD PRESSURE: 76 MMHG | TEMPERATURE: 96 F | BODY MASS INDEX: 45.52 KG/M2 | SYSTOLIC BLOOD PRESSURE: 144 MMHG

## 2023-04-28 DIAGNOSIS — E80.6 HYPERBILIRUBINEMIA: ICD-10-CM

## 2023-04-28 DIAGNOSIS — R11.10 VOMITING: ICD-10-CM

## 2023-04-28 DIAGNOSIS — R74.01 TRANSAMINITIS: ICD-10-CM

## 2023-04-28 DIAGNOSIS — R11.0 NAUSEA: Primary | ICD-10-CM

## 2023-04-28 LAB
ALBUMIN SERPL BCP-MCNC: 3.9 G/DL (ref 3.5–5)
ALP SERPL-CCNC: 155 U/L (ref 34–104)
ALT SERPL W P-5'-P-CCNC: 182 U/L (ref 7–52)
ANION GAP SERPL CALCULATED.3IONS-SCNC: 11 MMOL/L (ref 4–13)
APAP SERPL-MCNC: <10 UG/ML (ref 10–20)
APTT PPP: 32 SECONDS (ref 23–37)
AST SERPL W P-5'-P-CCNC: 127 U/L (ref 13–39)
BASOPHILS # BLD AUTO: 0.12 THOUSANDS/ΜL (ref 0–0.1)
BASOPHILS NFR BLD AUTO: 1 % (ref 0–1)
BILIRUB SERPL-MCNC: 2.12 MG/DL (ref 0.2–1)
BUN SERPL-MCNC: 16 MG/DL (ref 5–25)
CALCIUM SERPL-MCNC: 9.1 MG/DL (ref 8.4–10.2)
CARDIAC TROPONIN I PNL SERPL HS: 9 NG/L
CHLORIDE SERPL-SCNC: 98 MMOL/L (ref 96–108)
CO2 SERPL-SCNC: 25 MMOL/L (ref 21–32)
CREAT SERPL-MCNC: 0.99 MG/DL (ref 0.6–1.3)
EOSINOPHIL # BLD AUTO: 0.1 THOUSAND/ΜL (ref 0–0.61)
EOSINOPHIL NFR BLD AUTO: 1 % (ref 0–6)
ERYTHROCYTE [DISTWIDTH] IN BLOOD BY AUTOMATED COUNT: 19.1 % (ref 11.6–15.1)
GFR SERPL CREATININE-BSD FRML MDRD: 57 ML/MIN/1.73SQ M
GLUCOSE SERPL-MCNC: 124 MG/DL (ref 65–140)
HCT VFR BLD AUTO: 43.8 % (ref 34.8–46.1)
HGB BLD-MCNC: 13.2 G/DL (ref 11.5–15.4)
IMM GRANULOCYTES # BLD AUTO: 0.03 THOUSAND/UL (ref 0–0.2)
IMM GRANULOCYTES NFR BLD AUTO: 0 % (ref 0–2)
INR PPP: 2.05 (ref 0.84–1.19)
LIPASE SERPL-CCNC: 13 U/L (ref 11–82)
LYMPHOCYTES # BLD AUTO: 1.8 THOUSANDS/ΜL (ref 0.6–4.47)
LYMPHOCYTES NFR BLD AUTO: 21 % (ref 14–44)
MAGNESIUM SERPL-MCNC: 1.8 MG/DL (ref 1.9–2.7)
MCH RBC QN AUTO: 25.6 PG (ref 26.8–34.3)
MCHC RBC AUTO-ENTMCNC: 30.1 G/DL (ref 31.4–37.4)
MCV RBC AUTO: 85 FL (ref 82–98)
MONOCYTES # BLD AUTO: 0.84 THOUSAND/ΜL (ref 0.17–1.22)
MONOCYTES NFR BLD AUTO: 10 % (ref 4–12)
NEUTROPHILS # BLD AUTO: 5.87 THOUSANDS/ΜL (ref 1.85–7.62)
NEUTS SEG NFR BLD AUTO: 67 % (ref 43–75)
NRBC BLD AUTO-RTO: 0 /100 WBCS
PLATELET # BLD AUTO: 271 THOUSANDS/UL (ref 149–390)
PMV BLD AUTO: 9.8 FL (ref 8.9–12.7)
POTASSIUM SERPL-SCNC: 4.9 MMOL/L (ref 3.5–5.3)
PROT SERPL-MCNC: 6.3 G/DL (ref 6.4–8.4)
PROTHROMBIN TIME: 23.2 SECONDS (ref 11.6–14.5)
RBC # BLD AUTO: 5.16 MILLION/UL (ref 3.81–5.12)
SODIUM SERPL-SCNC: 134 MMOL/L (ref 135–147)
WBC # BLD AUTO: 8.76 THOUSAND/UL (ref 4.31–10.16)

## 2023-04-28 RX ORDER — METOCLOPRAMIDE HYDROCHLORIDE 5 MG/ML
10 INJECTION INTRAMUSCULAR; INTRAVENOUS ONCE
Status: COMPLETED | OUTPATIENT
Start: 2023-04-28 | End: 2023-04-28

## 2023-04-28 RX ORDER — SODIUM CHLORIDE, SODIUM LACTATE, POTASSIUM CHLORIDE, CALCIUM CHLORIDE 600; 310; 30; 20 MG/100ML; MG/100ML; MG/100ML; MG/100ML
150 INJECTION, SOLUTION INTRAVENOUS CONTINUOUS
Status: DISCONTINUED | OUTPATIENT
Start: 2023-04-28 | End: 2023-04-28 | Stop reason: HOSPADM

## 2023-04-28 RX ADMIN — METOCLOPRAMIDE 10 MG: 5 INJECTION, SOLUTION INTRAMUSCULAR; INTRAVENOUS at 19:58

## 2023-04-28 RX ADMIN — SODIUM CHLORIDE, SODIUM LACTATE, POTASSIUM CHLORIDE, AND CALCIUM CHLORIDE 150 ML/HR: .6; .31; .03; .02 INJECTION, SOLUTION INTRAVENOUS at 19:59

## 2023-04-28 NOTE — ED PROVIDER NOTES
History  Chief Complaint   Patient presents with   • Nausea     PT d/c from here on Tuesday  PT states she was admitted for nausea and vomiting  Seen by home health nurse today and reported nausea and vomiting, instructed to come to ED  History provided by:  Medical records and patient  Vomiting  Severity:  Mild  Duration:  1 day  Timing:  Intermittent  Quality:  Stomach contents  Able to tolerate:  Liquids  Progression:  Unchanged  Chronicity:  Recurrent  Recent urination:  Normal  Context comment:  Patient was recently hospitalized for nausea and vomiting of unclear etiology, she improved with Zofran, discharge 3 days prior to arrival, seen by home health care nurse today, reports of vomiting again, sent to the emergency room for an evaluation  Relieved by:  Nothing  Worsened by:  Nothing  Ineffective treatments:  Antiemetics  Associated symptoms: no abdominal pain, no arthralgias, no chills, no cough, no diarrhea, no fever, no headaches and no sore throat        Prior to Admission Medications   Prescriptions Last Dose Informant Patient Reported?  Taking?   docusate sodium (COLACE) 100 mg capsule   No No   Sig: Take 1 capsule (100 mg total) by mouth 2 (two) times a day   furosemide (LASIX) 20 mg tablet   Yes No   Sig: Take 20 mg by mouth as needed   levothyroxine 175 mcg tablet   Yes No   Sig: Take 1 tablet by mouth daily   losartan (COZAAR) 100 MG tablet   Yes No   Sig: Take 1 tablet by mouth daily   metFORMIN (GLUCOPHAGE-XR) 500 mg 24 hr tablet   Yes No   Sig: Take 500 mg by mouth daily with breakfast   metoprolol succinate (TOPROL-XL) 100 mg 24 hr tablet   Yes No   Sig: Take 100 mg by mouth 2 (two) times a day   ondansetron (Zofran ODT) 4 mg disintegrating tablet   No No   Sig: Take 1 tablet (4 mg total) by mouth every 6 (six) hours as needed for nausea or vomiting   pantoprazole (PROTONIX) 40 mg tablet   No No   Sig: Take 1 tablet (40 mg total) by mouth 2 (two) times a day for 14 days, THEN 1 tablet (40 mg total) daily  spironolactone (ALDACTONE) 25 mg tablet   Yes No   Sig: Take 25 mg by mouth daily   sucralfate (CARAFATE) 1 g/10 mL suspension   No No   Sig: Take 10 mL (1 g total) by mouth 4 (four) times a day   venlafaxine (EFFEXOR-XR) 150 mg 24 hr capsule   Yes No   Sig: Take 1 capsule by mouth daily   warfarin (COUMADIN) 5 mg tablet   Yes No   Sig: Take 5 mg by mouth daily 2 5mg tues/thurs, 5mg rest of days      Facility-Administered Medications: None       Past Medical History:   Diagnosis Date   • Arthritis    • CHF (congestive heart failure) (HCC)    • Disease of thyroid gland        Past Surgical History:   Procedure Laterality Date   • HYSTERECTOMY         History reviewed  No pertinent family history  I have reviewed and agree with the history as documented  E-Cigarette/Vaping   • E-Cigarette Use Never User      E-Cigarette/Vaping Substances     Social History     Tobacco Use   • Smoking status: Never   • Smokeless tobacco: Never   Vaping Use   • Vaping Use: Never used   Substance Use Topics   • Alcohol use: Never   • Drug use: Never       Review of Systems   Constitutional: Negative for chills, fatigue and fever  HENT: Negative for ear discharge, ear pain, rhinorrhea and sore throat  Eyes: Negative for pain and visual disturbance  Respiratory: Negative for cough and shortness of breath  Cardiovascular: Negative for chest pain and palpitations  Gastrointestinal: Positive for nausea and vomiting  Negative for abdominal pain and diarrhea  Endocrine: Negative for polydipsia, polyphagia and polyuria  Genitourinary: Negative for difficulty urinating, dysuria, flank pain and hematuria  Musculoskeletal: Negative for arthralgias and back pain  Skin: Negative for color change and rash  Allergic/Immunologic: Negative for immunocompromised state  Neurological: Negative for dizziness, seizures, syncope, weakness and headaches     Psychiatric/Behavioral: Negative for confusion and self-injury  The patient is not nervous/anxious  All other systems reviewed and are negative  Physical Exam  Physical Exam  Vitals and nursing note reviewed  Constitutional:       General: She is not in acute distress  Appearance: Normal appearance  She is obese  She is not ill-appearing, toxic-appearing or diaphoretic  Comments: HELADIO ROLLINS:      Head: Normocephalic and atraumatic  Nose: Nose normal  No congestion or rhinorrhea  Mouth/Throat:      Mouth: Mucous membranes are moist       Pharynx: Oropharynx is clear  No oropharyngeal exudate or posterior oropharyngeal erythema  Eyes:      General:         Right eye: No discharge  Left eye: No discharge  Extraocular Movements: Extraocular movements intact  Conjunctiva/sclera: Conjunctivae normal       Pupils: Pupils are equal, round, and reactive to light  Comments: Bilateral funduscopic exams within normal limits  Cardiovascular:      Rate and Rhythm: Normal rate and regular rhythm  Pulses: Normal pulses  Heart sounds: Normal heart sounds  No murmur heard  No gallop  Comments: +2 DP/PT pulses bilaterally  Pulmonary:      Effort: Pulmonary effort is normal  No respiratory distress  Breath sounds: Normal breath sounds  No stridor  No wheezing, rhonchi or rales  Chest:      Chest wall: No tenderness  Abdominal:      General: Bowel sounds are normal  There is no distension  Palpations: Abdomen is soft  There is no mass  Tenderness: There is no abdominal tenderness  There is no right CVA tenderness, left CVA tenderness, guarding or rebound  Hernia: No hernia is present  Musculoskeletal:         General: Normal range of motion  Cervical back: Normal range of motion and neck supple  Skin:     General: Skin is warm and dry  Capillary Refill: Capillary refill takes less than 2 seconds        Comments: Mild cyanosis of distal feet and toes, chronic per patient Neurological:      General: No focal deficit present  Mental Status: She is alert and oriented to person, place, and time  Cranial Nerves: No cranial nerve deficit  Sensory: No sensory deficit  Motor: No weakness  Coordination: Coordination normal       Gait: Gait normal       Deep Tendon Reflexes: Reflexes normal    Psychiatric:         Mood and Affect: Mood normal          Behavior: Behavior normal          Thought Content:  Thought content normal          Judgment: Judgment normal          Vital Signs  ED Triage Vitals [04/28/23 1911]   Temperature Pulse Respirations Blood Pressure SpO2   (!) 96 °F (35 6 °C) 62 18 135/76 96 %      Temp Source Heart Rate Source Patient Position - Orthostatic VS BP Location FiO2 (%)   Temporal Monitor Lying Right arm --      Pain Score       No Pain           Vitals:    04/28/23 1911   BP: 135/76   Pulse: 62   Patient Position - Orthostatic VS: Lying         Visual Acuity      ED Medications  Medications   lactated ringers infusion (0 mL/hr Intravenous Stopped 4/28/23 2106)   metoclopramide (REGLAN) injection 10 mg (10 mg Intravenous Given 4/28/23 1958)       Diagnostic Studies  Results Reviewed     Procedure Component Value Units Date/Time    HS Troponin 0hr (reflex protocol) [[id number]]  (Normal) Collected: 04/28/23 1957    Lab Status: Final result Specimen: Blood from Arm, Right Updated: 04/28/23 2034     hs TnI 0hr 9 ng/L     Protime-INR [[id number]]  (Abnormal) Collected: 04/28/23 1957    Lab Status: Final result Specimen: Blood from Arm, Right Updated: 04/28/23 2031     Protime 23 2 seconds      INR 2 05    APTT [[id number]]  (Normal) Collected: 04/28/23 1957    Lab Status: Final result Specimen: Blood from Arm, Right Updated: 04/28/23 2031     PTT 32 seconds     Comprehensive metabolic panel [[id number]]  (Abnormal) Collected: 04/28/23 1957    Lab Status: Final result Specimen: Blood from Arm, Right Updated: 04/28/23 2030     Sodium 134 mmol/L "Potassium 4 9 mmol/L      Chloride 98 mmol/L      CO2 25 mmol/L      ANION GAP 11 mmol/L      BUN 16 mg/dL      Creatinine 0 99 mg/dL      Glucose 124 mg/dL      Calcium 9 1 mg/dL       U/L       U/L      Alkaline Phosphatase 155 U/L      Total Protein 6 3 g/dL      Albumin 3 9 g/dL      Total Bilirubin 2 12 mg/dL      eGFR 57 ml/min/1 73sq m     Narrative:      [place] guidelines for Chronic Kidney Disease (CKD):   •  Stage 1 with normal or high GFR (GFR > 90 mL/min/1 73 square meters)  •  Stage 2 Mild CKD (GFR = 60-89 mL/min/1 73 square meters)  •  Stage 3A Moderate CKD (GFR = 45-59 mL/min/1 73 square meters)  •  Stage 3B Moderate CKD (GFR = 30-44 mL/min/1 73 square meters)  •  Stage 4 Severe CKD (GFR = 15-29 mL/min/1 73 square meters)  •  Stage 5 End Stage CKD (GFR <15 mL/min/1 73 square meters)  Note: GFR calculation is accurate only with a steady state creatinine    Lipase [[id number]]  (Normal) Collected: 04/28/23 1957    Lab Status: Final result Specimen: Blood from Arm, Right Updated: 04/28/23 2030     Lipase 13 u/L     Acetaminophen level-\"If concentration is detectable, please discuss with medical  on call  \" [[id number]]  (Abnormal) Collected: 04/28/23 1957    Lab Status: Final result Specimen: Blood from Arm, Right Updated: 04/28/23 2030     Acetaminophen Level <10 ug/mL     Magnesium [[id number]]  (Abnormal) Collected: 04/28/23 1957    Lab Status: Final result Specimen: Blood from Arm, Right Updated: 04/28/23 2030     Magnesium 1 8 mg/dL     CBC and differential [[id number]]  (Abnormal) Collected: 04/28/23 1957    Lab Status: Final result Specimen: Blood from Arm, Right Updated: 04/28/23 2006     WBC 8 76 Thousand/uL      RBC 5 16 Million/uL      Hemoglobin 13 2 g/dL      Hematocrit 43 8 %      MCV 85 fL      MCH 25 6 pg      MCHC 30 1 g/dL      RDW 19 1 %      MPV 9 8 fL      Platelets 831 Thousands/uL      nRBC 0 /100 WBCs      Neutrophils Relative 67 %  " Immat GRANS % 0 %      Lymphocytes Relative 21 %      Monocytes Relative 10 %      Eosinophils Relative 1 %      Basophils Relative 1 %      Neutrophils Absolute 5 87 Thousands/µL      Immature Grans Absolute 0 03 Thousand/uL      Lymphocytes Absolute 1 80 Thousands/µL      Monocytes Absolute 0 84 Thousand/µL      Eosinophils Absolute 0 10 Thousand/µL      Basophils Absolute 0 12 Thousands/µL     Rapid drug screen, urine [[id number]]     Lab Status: No result Specimen: Urine     UA (URINE) with reflex to Scope [[id number]]     Lab Status: No result Specimen: Urine                  No orders to display              Procedures  Procedures         ED Course                               SBIRT 22yo+    Flowsheet Row Most Recent Value   Initial Alcohol Screen: US AUDIT-C     1  How often do you have a drink containing alcohol? 0 Filed at: 04/28/2023 1916   2  How many drinks containing alcohol do you have on a typical day you are drinking? 0 Filed at: 04/28/2023 1916   3b  FEMALE Any Age, or MALE 65+: How often do you have 4 or more drinks on one occassion? 0 Filed at: 04/28/2023 1916   Audit-C Score 0 Filed at: 04/28/2023 1916   CRESENCIO: How many times in the past year have you    Used an illegal drug or used a prescription medication for non-medical reasons? Never Filed at: 04/28/2023 1916                    Medical Decision Making  1858: Patient appears well, vital signs reviewed  Patient does not appear overly dehydrated  Placed on the monitor  Plan to complete basic labs including cardiac enzymes, magnesium level, CMP, lipase, UDS, acetaminophen level as she had previously history of overuse secondary to chronic pains from her cancer and arthritis  Patient states she has decreased her Tylenol intake significantly  I will gently rehydrate with IV fluids as she does have some depressed EF and history of CHF  I will give Reglan for her discomfort and reevaluate  Recent CT abdomen pelvis reviewed    Benign abdominal exam     2030: Labs reviewed  Patient feels much improved  Patient with stable transaminitis and hyperbilirubinemia, this was identified on previous hospitalization, patient has follow-up with hepatologist and gastroenterologist shortly  I have discussed at length with her and her daughter about observation versus outpatient course of care  The patient would like to be discharged as she is feeling much improved and her findings are at baseline  Hyperbilirubinemia: chronic illness or injury  Nausea: chronic illness or injury  Transaminitis: chronic illness or injury  Vomiting: chronic illness or injury  Amount and/or Complexity of Data Reviewed  Labs: ordered  Risk  Prescription drug management  Disposition  Final diagnoses:   Nausea   Vomiting   Transaminitis   Hyperbilirubinemia     Time reflects when diagnosis was documented in both MDM as applicable and the Disposition within this note     Time User Action Codes Description Comment    4/28/2023  8:40 PM Patrice Kwan [R11 0] Nausea     4/28/2023  8:40 PM Patrice Jacks [R11 10] Vomiting     4/28/2023  8:40 PM Amalia Le Roy Add [R74 01] Transaminitis     4/28/2023  8:40 PM Amalia Le Roy Add [E80 6] Hyperbilirubinemia       ED Disposition     ED Disposition   Discharge    Condition   Stable    Date/Time   Fri Apr 28, 2023  8:40 PM    Comment   Abel Burkitt discharge to home/self care                 Follow-up Information     Follow up With Specialties Details Why Contact Info Additional Information    Jojo Echavarria MD Gastroenterology Schedule an appointment as soon as possible for a visit   [address] [place] [address] [phone]       [facility] Emergency Department Emergency Medicine  If symptoms worsen Maday Stevenson [address] [place] [address] Emergency Department, [facility]  [facility] [address], [address]          Patient's Medications   Discharge Prescriptions    No medications on file       No discharge procedures on file      PDMP Review     None          ED Provider  Electronically Signed by           Leif Starr MD  04/28/23 2622

## 2023-05-01 LAB
QRS AXIS: -21 DEGREES
QRSD INTERVAL: 108 MS
QT INTERVAL: 422 MS
QTC INTERVAL: 442 MS
T WAVE AXIS: 144 DEGREES
VENTRICULAR RATE: 66 BPM

## 2023-05-23 ENCOUNTER — HOSPITAL ENCOUNTER (OUTPATIENT)
Dept: RADIOLOGY | Facility: HOSPITAL | Age: 73
Discharge: HOME | End: 2023-05-23

## 2023-05-23 DIAGNOSIS — K74.60 CIRRHOSIS OF LIVER WITH ASCITES, UNSPECIFIED HEPATIC CIRRHOSIS TYPE (HCC): ICD-10-CM

## 2023-05-23 DIAGNOSIS — R18.8 CIRRHOSIS OF LIVER WITH ASCITES, UNSPECIFIED HEPATIC CIRRHOSIS TYPE (HCC): ICD-10-CM

## 2023-05-23 RX ADMIN — GADOBUTROL 12 ML: 604.72 INJECTION INTRAVENOUS at 11:11

## 2023-05-23 NOTE — NURSING NOTE
Device interrogation for MRI  Normal device function prior to MRI  Leads and device meet all requirements per policy for MRI  Device programmed OVO per Cardiology for MRI  Patient has no complaints  Vital signs monitored throughout by Cinthia Keene RN  Normal device function post MRI  Device reprogrammed to prior settings per Cardiology

## 2023-07-21 ENCOUNTER — TELEPHONE (OUTPATIENT)
Age: 73
End: 2023-07-21

## 2023-07-21 ENCOUNTER — TELEPHONE (OUTPATIENT)
Dept: GASTROENTEROLOGY | Facility: CLINIC | Age: 73
End: 2023-07-21

## 2023-07-21 NOTE — TELEPHONE ENCOUNTER
LVM with pt's daughter Pinky Huitron advising her to reach out to Dr Chuy Wallace as he is the last GI provider to see pt or pt's PCP. Advised ED eval for alarming s/s.

## 2023-07-21 NOTE — TELEPHONE ENCOUNTER
Patients GI provider:  Dr. Shira Ramirez    Number to return call: daughter Ryley Rayo [phone]    Reason for call: Pt daughter called stating her mother has an extended stomach and vomiting.  Pt daughter states mom has not been able to get out of bed since she came home from the hospital.    Scheduled procedure/appointment date if applicable: Apt/procedure 8/1/2023

## 2023-07-21 NOTE — TELEPHONE ENCOUNTER
I called the patient and lvm for them to be scheduled at our hepatology clinic per referral guidelines.

## 2023-07-28 PROBLEM — E87.20 METABOLIC ACIDOSIS: Status: ACTIVE | Noted: 2023-01-01

## 2023-07-28 PROBLEM — E80.6 HYPERBILIRUBINEMIA: Status: ACTIVE | Noted: 2023-01-01

## 2023-07-28 PROBLEM — K74.60 LIVER CIRRHOSIS (HCC): Status: ACTIVE | Noted: 2023-07-28

## 2023-07-28 PROBLEM — K72.90 LIVER FAILURE (HCC): Status: ACTIVE | Noted: 2023-01-01

## 2023-07-28 PROBLEM — I50.9 ACUTE DECOMPENSATED HEART FAILURE (HCC): Status: ACTIVE | Noted: 2023-07-28

## 2023-07-28 PROBLEM — R09.89 DECREASED PEDAL PULSES: Status: ACTIVE | Noted: 2023-07-28

## 2023-07-28 PROBLEM — G93.40 ACUTE ENCEPHALOPATHY: Status: ACTIVE | Noted: 2023-01-01

## 2023-07-28 PROBLEM — N17.9 ACUTE KIDNEY INJURY (HCC): Status: ACTIVE | Noted: 2023-07-28

## 2023-07-28 PROBLEM — E87.1 ACUTE HYPONATREMIA: Status: ACTIVE | Noted: 2023-07-28

## 2023-07-28 PROBLEM — K76.82 ACUTE HEPATIC ENCEPHALOPATHY (HCC): Status: ACTIVE | Noted: 2023-01-01

## 2023-07-28 PROBLEM — E83.42 LOW SERUM MAGNESIUM LEVEL: Status: ACTIVE | Noted: 2023-01-01

## 2023-07-28 PROBLEM — R26.2 AMBULATORY DYSFUNCTION: Status: ACTIVE | Noted: 2023-07-28

## 2023-07-28 NOTE — CONSULTS
[address] [facility]  Consult  Name: Rosalva Christensen 67 y.o. female I MRN: [id number]  Unit/Bed#: -01 I Date of Admission: 2023   Date of Service: 2023 I Hospital Day: 0    Consults    Assessment/Plan   * Acute encephalopathy  Assessment & Plan  · Unclear etiology. · Head CT: (-) acute  · AB.43/28/150/18.5  · F/u ammonia level: Pending  · UA/culture: Pending  · Sodium: 121   · BCx2: pending  · Covid PCR negative  · TSH: pending  · T/c 2/2 hypoperfusion from decompensated heart failure  · Neuro checks, [facility]-ICU. Will transfer to [facility] under CC service for ongoing workup/management      Metabolic acidosis  Assessment & Plan  · AB.//150/18.5  · Trend BMP q6h    Acute kidney injury ([address])  Assessment & Plan  · Suspected 2/2 ATN from low flow state. Reportedly oliguric  · Trend Renal function, UO      Acute decompensated heart failure [facility])  Assessment & Plan  Wt Readings from Last 3 Encounters:   23 135 kg (296 lb 15.4 oz)   23 120 kg (265 lb 3.4 oz)   23 120 kg (265 lb 3.4 oz)   · Echo 23 EF 20%. Severe AS (AV peak velocity 2.1 m/s. AV mean gradient 9 mmHg. DVI 0.19). Moderate TR  • She follows with cardiology at [facility] for CAD, AS, cardiomyopathy, and CHF. She was hospitalized in 2018 with CHF. She has had cardiac cath twice in the past. She had MI in her 25s. • GDMT: 20mg lasix, losartan,metoprolol. Single lead ICD in place. • Cardiology consulted this admission. • Clinically appears volume overloaded. Ordered 80mg lasix IV tid by cardiology  • Monitor UO (reported oliguric), daily weights  • Distal extremities cool to touch, unknown if acute on chronic however has venous stasis changes suggestive of chronicity. Currently LFTs/BP/renal function stable. Check lactic. Monitor for development of cardiogenic shock. Acute hyponatremia  Assessment & Plan  · Na 121.  Suspected hypervolemic hyponatremia in setting of decompensated CHF  · TSH/cortisol/uric acid/urine osmol/urine sodium/serum osmol pending  · S/p 40mg lasix IV x1. Currently ordered 80mg tid   · Spirinolactone on hold  · Do not have strong suspicion encephalopathy 2/2 hyponatremia however considered. If rest of work up negative, will consider hypertonic. · BMP q6  · Goal serum sodium no more than 127 by 7/29 am    Hyperbilirubinemia  Assessment & Plan  · H/o same. · Ongoing workup in process with outpatient GI/hepatology  · MRI: 1. Mild hepatic steatosis. Cirrhotic morphology was better visualized on ultrasound. Echogenic lesion seen on ultrasound is not definitely visualized given limitations of the study. No definite suspicious mass is visualized and this may have represented focal fat. 3.  Mildly increased abdominopelvic ascites and diffuse subcutaneous edema. · AST 27/ALT 29    Ambulatory dysfunction  Assessment & Plan  · PT/OT when stable    Low serum magnesium level  Assessment & Plan  · replete    Atrial fibrillation (HCC)  Assessment & Plan  · Currently in NSR. Rate 78  · Managed on coumadin outpatient. · Hold and switch to heparin infusion for potential central line placement 2/2 limited access/multiple lab draws/ScVo2 check  · INR 1.9. Trend  · Continue metoprolol w/ hold parameters      Type 2 diabetes mellitus, without long-term current use of insulin [facility])  Assessment & Plan  Lab Results   Component Value Date    HGBA1C 6.8 (H) 04/18/2023       Recent Labs     07/28/23  0827   POCGLU 135       Blood Sugar Average: Last 72 hrs:  (P) 135     · Home med: metformin. · Hold. Transition to SSI    Disease of thyroid gland  Assessment & Plan  · Check TSH           History of Present Illness     HPI: Kip Monday is a 67 y.o. h/o combined systolic/diastolic heart failure, Severe AS, CAD, PAF on coumadin, morbid obesity (BMI 50), Diabetes (non-insulin dependent), known liver cirrhosis, ambulatory dysfunciton admitted on 7/28 for encephalopathy.  Per H&P, patient's daughter brought patient in for increased confusion, decreased oral intake over past week and reported nonadherance to prescribed essential medications such as her diuretics. A rapid response was called this am when patient had continued encephalopathy and reported hypoxia (low spo2) and found patient confused with non-palpable, cool distal extremities. She was transferred to the ICU for ongoing management/care. History obtained from chart review. Review of Systems   Unable to perform ROS: Mental status change   All other systems reviewed and are negative. Historical Information   Past Medical History:  No date: Arthritis  No date: CHF (congestive heart failure) (HCC)  No date: Disease of thyroid gland Past Surgical History:  No date: HYSTERECTOMY   Current Outpatient Medications   Medication Instructions   • docusate sodium (COLACE) 100 mg, Oral, 2 times daily   • furosemide (LASIX) 20 mg, Oral, As needed   • levothyroxine 175 mcg tablet 1 tablet, Oral, Daily   • losartan (COZAAR) 100 MG tablet 1 tablet, Oral, Daily   • metFORMIN (GLUCOPHAGE-XR) 500 mg, Oral, Daily with breakfast   • metoprolol succinate (TOPROL-XL) 100 mg, Oral, 2 times daily   • ondansetron (ZOFRAN ODT) 4 mg, Oral, Every 6 hours PRN   • pantoprazole (PROTONIX) 40 mg tablet Take 1 tablet (40 mg total) by mouth 2 (two) times a day for 14 days, THEN 1 tablet (40 mg total) daily.    • spironolactone (ALDACTONE) 25 mg, Oral, Daily   • sucralfate (CARAFATE) 1 g, Oral, 4 times daily   • venlafaxine (EFFEXOR-XR) 150 mg 24 hr capsule 1 capsule, Oral, Daily   • warfarin (COUMADIN) 5 mg, Oral, Daily (warfarin), 2.5mg tues/thurs, 5mg rest of days    Allergies   Allergen Reactions   • Bupropion      Other reaction(s): Hyper  Other reaction(s): Hyper     • Prednisone    • Tyloxapol      Other reaction(s): halucination   • Penicillins Hives and Rash     Other reaction(s): hives      Social History     Tobacco Use   • Smoking status: Never   • Smokeless tobacco: Never   Vaping Use   • Vaping Use: Never used   Substance Use Topics   • Alcohol use: Never   • Drug use: Never    History reviewed. No pertinent family history. Objective                            Vitals I/O      Most Recent Min/Max in 24hrs   Temp (!) 97.3 °F (36.3 °C) Temp  Min: 96.9 °F (36.1 °C)  Max: 97.7 °F (36.5 °C)   Pulse 81 Pulse  Min: 80  Max: 85   Resp (!) 24 Resp  Min: 22  Max: 26   /65 BP  Min: 106/56  Max: 126/65   O2 Sat 97 % SpO2  Min: 95 %  Max: 99 %      Intake/Output Summary (Last 24 hours) at 7/28/2023 1018  Last data filed at 7/28/2023 0240  Gross per 24 hour   Intake --   Output 2 ml   Net -2 ml         Diet Cardiovascular; Cardiac; Fluid Restriction 1800 ML, Consistent Carbohydrate Diet Level 2 (5 carb servings/75 grams CHO/meal)     Invasive Monitoring Physical exam   none Physical Exam  Vitals reviewed. Constitutional:       General: She is awake. Appearance: She is obese. She is ill-appearing. She is not diaphoretic. Interventions: Nasal cannula in place. HENT:      Head: Normocephalic. Mouth/Throat:      Mouth: Mucous membranes are dry. Cardiovascular:      Rate and Rhythm: Normal rate and regular rhythm. Pulses: Decreased pulses. Dorsalis pedis pulses are 0 on the right side and 0 on the left side. Posterior tibial pulses are 1+ on the right side and 1+ on the left side. Heart sounds: Murmur heard. Comments: anasarca  Pulmonary:      Effort: Tachypnea present. No accessory muscle usage. Breath sounds: Decreased breath sounds present. Abdominal:      General: Abdomen is protuberant. Tenderness: There is no abdominal tenderness. Genitourinary:     Comments: [place] in place with minimal clear yellow urine  Skin:     General: Skin is cool. Comments: Distal extremities ice cold to the touch. Decreased capillary refill. Neurological:      General: No focal deficit present. GCS: GCS eye subscore is 3. GCS verbal subscore is 4. GCS motor subscore is 6. Motor: Motor function is intact. Diagnostic Studies      EKG: NSR rate 72  Imaging:  CT Head: No acute intracranial abnormality. Mild chronic small vessel ischemic changes. CT CAP: Diffuse subcutaneous edema most extensive at the partially visualized left chest, abdominal and pelvic wall. The findings may be secondary to a fluid overloaded state. Other infectious/inflammatory etiologies cannot be excluded. Clinical correlation is   recommended. No soft tissue gas noted. Cirrhotic appearing liver with moderate intra-abdominal pelvic ascites. No acute intra-abdominal abnormality. Scattered colonic diverticulosis with no evidence of diverticulitis. No infiltrate or pleural effusion. Mild cardiomegaly. I have personally reviewed pertinent reports.        Medications:  Scheduled PRN   insulin lispro, 2-12 Units, TID AC  insulin lispro, 2-12 Units, HS  levothyroxine, 175 mcg, Early Morning  magnesium sulfate, 1 g, Once          Continuous          Labs:    CBC    Recent Labs     07/28/23  0326   WBC 10.29*   HGB 15.2   HCT 44.7        BMP    Recent Labs     07/28/23  0326   SODIUM 121*   K 4.3   CL 89*   CO2 18*   AGAP 14   BUN 28*   CREATININE 1.29   CALCIUM 8.7       Coags    Recent Labs     07/28/23  0326   INR 1.90*   PTT 36        Additional Electrolytes  Recent Labs     07/28/23  0326   MG 1.7*          Blood Gas    Recent Labs     07/28/23  0849   PHART 7.439   RZZ2USQ 28.0*   PO2ART 150.8*   CCP1SRZ 18.5*   BEART -3.9   SOURCE Radial, Left     Recent Labs     07/28/23  0849   SOURCE Radial, Left    LFTs  Recent Labs     07/28/23  0326   ALT 28   AST 27   ALKPHOS 152*   ALB 3.2*   TBILI 4.61*       Infectious  No recent results  Glucose  Recent Labs     07/28/23  0326   GLUC 148*             Critical Care Time Delivered: Upon my evaluation, this patient had a high probability of imminent or life-threatening deterioration due to encephalopathy, possible limb ischemia, which required my direct attention, intervention, and personal management. I have personally provided 35 minutes of critical care time, exclusive of procedures, teaching, family meetings, and any prior time recorded by providers other than myself.    Virginia Guerin

## 2023-07-28 NOTE — H&P
[address] [facility]  H&P  Name: Zeenat Chin 67 y.o. female I MRN: [id number]  Unit/Bed#: -01 I Date of Admission: 7/28/2023   Date of Service: 7/28/2023 I Hospital Day: 0      Assessment/Plan   * Acute encephalopathy  Assessment & Plan  · POA from home with confusion worsening over the past week beginning on Sunday  · CT brain no acute abnormality  · UA pending  · Patient overtly hypervolemic with elevated bilirubin, ammonia level pending  · Possible secondary to hyponatremia  · Neurological exam nonfocal  · Continue to monitor neuro status    Ambulatory dysfunction  Assessment & Plan  · Discussed on telephone with patient's daughter who lives and cares for her  · Reports patient fell from a standing position in June when getting out of bed and has not left her bed since June  · Daughter reports she has called Senior services requesting assistance and has not been able to schedule assistance yet  · Appreciate PT/OT/case management as daughter will consider rehab placement due to her own health issues caring for patient.   She does have other family members to assist but feels the patient is not able to get the care she needs in the home    Acute decompensated heart failure ([address])  Assessment & Plan  Wt Readings from Last 3 Encounters:   07/28/23 135 kg (296 lb 15.4 oz)   04/28/23 120 kg (265 lb 3.4 oz)   04/25/23 120 kg (265 lb 3.4 oz)     · Appears in acute decompensated heart failure with anasarca, orthopnea, not hypoxic on room air  · Weight 30 pounds increased from April   · Livedo reticularis bilateral feet concerning for worsening of cardiac output  · BNP and total bilirubin elevated  · follows with [facility] cardiology per daughter on the telephone, I cannot connect to [facility] everywhere to see cardiology records  · Previously review of PCP records patient was on furosemide 80 mg a.m. and 40 mg p.m. but patient no longer takes Lasix and states the cardiologist told her she only needs to take it as needed, I cannot verify this information at time of admission as there is not a current medication list available to the daughter and the patient is unable to state her medications  · Previously was on Aldactone 20 mg daily, metoprolol 100 mg twice daily, losartan 100 mg daily  · Admit to CHF pathway  · IV diuresis  · Intake output, daily weight  · 2 g sodium diet with fluid restriction  · Echocardiogram EF 20%/24/23- AICD in place   · Appreciate cardiology consultation    Low serum magnesium level  Assessment & Plan  Magnesium level 1.7   replete and recheck    Acute hyponatremia  Assessment & Plan  · Sodium 121, this is new as previous labs have been in normal range  · Suspected due to overt hypervolemia and dilution  · IV diuresis  · Check urine sodium, urine osmolality, serum osmolality  · May be cause of patient's encephalopathy    Atrial fibrillation ([address])  Assessment & Plan  · History PAF  · Records indicate metoprolol 100 mg twice daily-hold pending cardiology evaluation with soft blood pressures  · Sinus rhythm on EKG at time of admission  · Chronically anticoagulated with warfarin  · INR 1.9 on admission  · Goal INR 2-3-check daily  · We will need to verify warfarin dosing with cardiology when records available    Type 2 diabetes mellitus, without long-term current use of insulin ([address])  Assessment & Plan  Lab Results   Component Value Date    HGBA1C 6.8 (H) 04/18/2023       No results for input(s): "POCGLU" in the last 72 hours.     Blood Sugar Average: Last 72 hrs:  · Metformin on hold  · Sliding scale insulin coverage with Accu-Cheks  · Hypoglycemia protocol  · Carbohydrate controlled diet  · Update hemoglobin A1c    Disease of thyroid gland  Assessment & Plan  · History hypothyroidism  · Continue levothyroxine 175 mcg daily Per care everywhere  · Update TSH       VTE Pharmacologic Prophylaxis:   High Risk (Score >/= 5) - Pharmacological DVT Prophylaxis Ordered: warfarin (Coumadin). Sequential Compression Devices Ordered. Code Status: Level 3 - DNAR and DNI   Discussion with family: Updated  (daughter) via phone. Anticipated Length of Stay: Patient will be admitted on an inpatient basis with an anticipated length of stay of greater than 2 midnights secondary to Encephalopathy, hyponatremia. Total Time Spent on Date of Encounter in care of patient: 65 minutes This time was spent on one or more of the following: performing physical exam; counseling and coordination of care; obtaining or reviewing history; documenting in the medical record; reviewing/ordering tests, medications or procedures; communicating with other healthcare professionals and discussing with patient's family/caregivers. Chief Complaint: Confusion    History of Present Illness:  Marie Nelson is a 67 y.o. female with a PMH of CHF reduced ejection fraction, PAF on warfarin, abnormal liver enzymes, NADIA, hypothyroidism, amatory dysfunction and has not been out of her bed for the past month after a fall in her home who presents with with increasing confusion and weakness. History obtained from daughter who lives with patient states patient follows with [facility] cardiology but she cannot locate a medication list and that patient has not been taking her medications on a regular basis and has not taken furosemide in the past few weeks. Patient's daughter states on Sunday when she came back from vacation noticed the patient was confused and this is worsened over the past week now with decreased oral intake as well. Patient's daughter concerned with care ability for the patient in her home even though she has other family members assisting her she is concerned the patient is not getting adequate care and has contacted Senior services. In the emergency department patient is nonfocal neurological exam but unable to answer questions correctly or follow commands. Patient is moving all extremities. Noted to have wounds over trunk anterior/posterior and all extremities consistent with daughter stating patient has not been out of her bed for the past month. Patient's daughter states significant edema and patient is 30 pound weight increase from previous admission in April with diffuse anasarca. Presented to the medical service for further evaluation and treatment of acute hyponatremia, decompensated CHF, altered mental status with normal CT brain but UA and ammonia levels are pending. Review of Systems:  Review of Systems   Unable to perform ROS: Mental status change       Past Medical and Surgical History:   Past Medical History:   Diagnosis Date   • Arthritis    • CHF (congestive heart failure) ([address])    • Disease of thyroid gland        Past Surgical History:   Procedure Laterality Date   • HYSTERECTOMY         Meds/Allergies:  Prior to Admission medications    Medication Sig Start Date End Date Taking? Authorizing Provider   docusate sodium (COLACE) 100 mg capsule Take 1 capsule (100 mg total) by mouth 2 (two) times a day 4/25/23   Mohini Boyer PA-C   furosemide (LASIX) 20 mg tablet Take 20 mg by mouth as needed    Historical Provider, MD   levothyroxine 175 mcg tablet Take 1 tablet by mouth daily 3/13/23   Historical Provider, MD   losartan (COZAAR) 100 MG tablet Take 1 tablet by mouth daily 2/15/23   Historical Provider, MD   metFORMIN (GLUCOPHAGE-XR) 500 mg 24 hr tablet Take 500 mg by mouth daily with breakfast 11/10/22   Historical Provider, MD   metoprolol succinate (TOPROL-XL) 100 mg 24 hr tablet Take 100 mg by mouth 2 (two) times a day    Historical Provider, MD   ondansetron (Zofran ODT) 4 mg disintegrating tablet Take 1 tablet (4 mg total) by mouth every 6 (six) hours as needed for nausea or vomiting 4/25/23   Mohini Boyer PA-C   pantoprazole (PROTONIX) 40 mg tablet Take 1 tablet (40 mg total) by mouth 2 (two) times a day for 14 days, THEN 1 tablet (40 mg total) daily.  4/25/23 7/24/23  Shira Ansari PA-C   spironolactone (ALDACTONE) 25 mg tablet Take 25 mg by mouth daily    Historical Provider, MD   sucralfate (CARAFATE) 1 g/10 mL suspension Take 10 mL (1 g total) by mouth 4 (four) times a day 4/10/23 5/10/23  Cherise Alcaraz,    venlafaxine (EFFEXOR-XR) 150 mg 24 hr capsule Take 1 capsule by mouth daily 4/3/23   Historical Provider, MD   warfarin (COUMADIN) 5 mg tablet Take 5 mg by mouth daily 2.5mg tues/thurs, 5mg rest of days    Historical Provider, MD     I have been unable to obtain / verify an up to date medication list despite all reasonable attempts. Allergies: Allergies   Allergen Reactions   • Bupropion      Other reaction(s): Hyper  Other reaction(s): Hyper     • Prednisone    • Tyloxapol      Other reaction(s): halucination   • Penicillins Hives and Rash     Other reaction(s): hives       Social History:  Marital Status:    Occupation: Retired  Patient Pre-hospital Living Situation: With other family member: Daughter  Patient Pre-hospital Level of Mobility: non-ambulatory/bed bound  Patient Pre-hospital Diet Restrictions: None  Substance Use History:   Social History     Substance and Sexual Activity   Alcohol Use Never     Social History     Tobacco Use   Smoking Status Never   Smokeless Tobacco Never     Social History     Substance and Sexual Activity   Drug Use Never       Family History:  History reviewed. No pertinent family history. Physical Exam:     Vitals:   Blood Pressure: 108/77 (07/28/23 0724)  Pulse: 80 (07/28/23 0724)  Temperature: (!) 97.3 °F (36.3 °C) (07/28/23 0724)  Temp Source: Temporal (07/28/23 0603)  Respirations: 22 (07/28/23 0724)  Height: 5' 4" (162.6 cm) (07/28/23 0246)  Weight - Scale: 135 kg (296 lb 15.4 oz) (07/28/23 0246)  SpO2: 95 % (07/28/23 0724)    Physical Exam  Vitals and nursing note reviewed. Constitutional:       General: She is in acute distress. Appearance: She is well-developed. She is obese.  She is ill-appearing. HENT:      Head: Normocephalic and atraumatic. Mouth/Throat:      Mouth: Mucous membranes are dry. Eyes:      Conjunctiva/sclera: Conjunctivae normal.   Cardiovascular:      Rate and Rhythm: Normal rate and regular rhythm. Heart sounds: Murmur heard. Pulmonary:      Effort: Pulmonary effort is normal. No respiratory distress. Breath sounds: Normal breath sounds. Abdominal:      General: There is distension. Palpations: Abdomen is soft. Tenderness: There is no abdominal tenderness. Musculoskeletal:         General: Swelling present. Cervical back: Neck supple. Right lower leg: Edema present. Left lower leg: Edema present. Skin:     General: Skin is warm and dry. Capillary Refill: Capillary refill takes 2 to 3 seconds. Findings: Erythema and rash present. Comments: Bilateral lower extremities with purple discoloration and decreased cap refill. Neurological:      General: No focal deficit present. Mental Status: She is alert. She is disoriented.       Comments: Generalized weakness and muscle atrophy   Psychiatric:      Comments: Unable to evaluate        Additional Data:     Lab Results:  Results from last 7 days   Lab Units 07/28/23  0326   WBC Thousand/uL 10.29*   HEMOGLOBIN g/dL 15.2   HEMATOCRIT % 44.7   PLATELETS Thousands/uL 149   NEUTROS PCT % 85*   LYMPHS PCT % 11*   MONOS PCT % 3*   EOS PCT % 0     Results from last 7 days   Lab Units 07/28/23  0326   SODIUM mmol/L 121*   POTASSIUM mmol/L 4.3   CHLORIDE mmol/L 89*   CO2 mmol/L 18*   BUN mg/dL 28*   CREATININE mg/dL 1.29   ANION GAP mmol/L 14   CALCIUM mg/dL 8.7   ALBUMIN g/dL 3.2*   TOTAL BILIRUBIN mg/dL 4.61*   ALK PHOS U/L 152*   ALT U/L 28   AST U/L 27   GLUCOSE RANDOM mg/dL 148*     Results from last 7 days   Lab Units 07/28/23  0326   INR  1.90*             Results from last 7 days   Lab Units 07/28/23  0326   LACTIC ACID mmol/L 2.8*       Lines/Drains:  Invasive Devices     Peripheral Intravenous Line  Duration           Peripheral IV 07/28/23 Right Antecubital <1 day          Drain  Duration           Urethral Catheter Temperature probe 16 Fr. <1 day              Urinary Catheter:  Goal for removal: Remove after 48 hrs of I/O monitoring             Imaging: Reviewed radiology reports from this admission including: chest CT scan, abdominal/pelvic CT and CT head  CT head without contrast   Final Result by Alvaro Hurtado MD (07/28 3291)      No acute intracranial abnormality. Mild chronic small vessel ischemic changes. Workstation performed: [id number]         CT chest abdomen pelvis wo contrast   Final Result by Alvaro Hurtado MD (07/28 9128)      Diffuse subcutaneous edema most extensive at the partially visualized left chest, abdominal and pelvic wall. The findings may be secondary to a fluid overloaded state. Other infectious/inflammatory etiologies cannot be excluded. Clinical correlation is    recommended. No soft tissue gas noted. Cirrhotic appearing liver with moderate intra-abdominal pelvic ascites. No acute intra-abdominal abnormality. Scattered colonic diverticulosis with no evidence of diverticulitis. No infiltrate or pleural effusion. Mild cardiomegaly. Workstation performed: [id number]         VAS lower limb arterial duplex, complete bilateral    (Results Pending)       EKG and Other Studies Reviewed on Admission:   · EKG: NSR. HR Sinus rhythm. ** Please Note: This note has been constructed using a voice recognition system.  **

## 2023-07-28 NOTE — ASSESSMENT & PLAN NOTE
· Discussed on telephone with patient's daughter who lives and cares for her  · Reports patient fell from a standing position in June when getting out of bed and has not left her bed since June  · Daughter reports she has called Senior services requesting assistance and has not been able to schedule assistance yet  · Appreciate PT/OT/case management as daughter will consider rehab placement due to her own health issues caring for patient.   She does have other family members to assist but feels the patient is not able to get the care she needs in the home

## 2023-07-28 NOTE — PHYSICAL THERAPY NOTE
PHYSICAL THERAPY NOTE          Patient Name: Marie Nelson  TLFZL'T Date: 7/28/2023 07/28/23 0845   Note Type   Note type Evaluation; Cancelled Session   Cancel Reasons Medical status       Received order for PT consult. Chart reviewed. Patient admitted with diagnosis acute encephalopathy. Patient rapid response this AM, not medically appropriate for PT at this time. Will continue to follow and see patient as medically appropriate at a later time.     Kieran Sweet, PT,DPT

## 2023-07-28 NOTE — ASSESSMENT & PLAN NOTE
· Na 121. Suspected hypervolemic hyponatremia in setting of decompensated CHF  · TSH/cortisol/uric acid/urine osmol/urine sodium/serum osmol pending  · S/p 40mg lasix IV x1. Currently ordered 80mg tid   · Spirinolactone on hold  · Do not have strong suspicion encephalopathy 2/2 hyponatremia however considered. If rest of work up negative, will consider hypertonic.    · BMP q6  · Goal serum sodium no more than 127 by 7/29 am

## 2023-07-28 NOTE — MALNUTRITION/BMI
This medical record reflects one or more clinical indicators suggestive of morbid obesity. BMI Findings:  Adult BMI Classifications: Morbid Obesity 50-59.9        Body mass index is 50.97 kg/m². See Nutrition note dated 7/28/23 for additional details. Completed nutrition assessment is viewable in the nutrition documentation.

## 2023-07-28 NOTE — ASSESSMENT & PLAN NOTE
· Currently in NSR. Rate 78  · Managed on coumadin outpatient. · Hold and switch to heparin infusion for potential central line placement 2/2 limited access/multiple lab draws/ScVo2 check  · INR 1.9.  Trend  · Continue metoprolol w/ hold parameters

## 2023-07-28 NOTE — CONSULTS
Consultation - Cardiology   Chris Boggs 67 y.o. female MRN: [id number]  Unit/Bed#: -01 Encounter: [id number]    Assessment/Plan     Assessment:  Acute on chronic heart failure-LVEF 20%   - worrisome for cardiogenic shock    - hypotensive   - cool extremities    - grossly volume overloaded   Hyponatremia- hypervolemic   Encephalopathy   DM2  Non ischemic cardiomyopathy  CAD ruled out by [facility] [place] 2019  ICD in place managed by [id number]  PAF on coumadin   NADIA on CPAP  HLD    Plan:  1. Goals of care through ICU team to daughter will be planned  2. If plan is to continue aggressive treatment patient likely requires milrinone and a lasix infusion- discussed with ICU team  3. Monitor on telemetry  4. Strict I and O  5. Coumadin on hold switching to a heparin infusion  6. Limited echo for LVEF    History of Present Illness   Physician Requesting Consult: Brodie Jim MD  Reason for Consult / Principal Problem: acute on chronic HFpEF   HPI: Chris Boggs is a 67y.o. year old female with history of non ischemic cardiomyopathy, CHFrEF with LVEF of 20%, PAF on coumadin, ICD in place, NADIA on CPAP, DM2, non compliance presented to the hospital after laying in bed for the last month. Apparently daughter went on vacation and patient has gained about 30 lbs and there was concern that patient was not able to be cared for at home. On admission anasarca was found. bnp was noted to be 2348. She is hyponatremic and confused. Lactate is 2.8. He is currently being diuresed with lasix 80 IV TID. Patient is encephalopathic and clearly in cardiogenic shock. Daughter is not currently at the bedside and goals of care are not clear at this time. Patient is sleeping.      Inpatient consult to Cardiology  Consult performed by: Faith Garcia PA-C  Consult ordered by: VLAD Holder          Review of Systems   Unable to perform ROS: Acuity of condition       Historical Information   Past Medical History:   Diagnosis Date   • Arthritis    • CHF (congestive heart failure) (HCC)    • Disease of thyroid gland      Past Surgical History:   Procedure Laterality Date   • HYSTERECTOMY       Social History     Substance and Sexual Activity   Alcohol Use Never     Social History     Substance and Sexual Activity   Drug Use Never     E-Cigarette/Vaping   • E-Cigarette Use Never User      E-Cigarette/Vaping Substances     Social History     Tobacco Use   Smoking Status Never   Smokeless Tobacco Never     Family History: non-contributory    Meds/Allergies   all current active meds have been reviewed  Allergies   Allergen Reactions   • Bupropion      Other reaction(s): Hyper  Other reaction(s): Hyper     • Prednisone    • Tyloxapol      Other reaction(s): halucination   • Penicillins Hives and Rash     Other reaction(s): hives       Objective   Vitals: Blood pressure 108/77, pulse 80, temperature (!) 97.3 °F (36.3 °C), resp. rate 22, height 5' 4" (1.626 m), weight 135 kg (296 lb 15.4 oz), SpO2 95 %. Orthostatic Blood Pressures    Flowsheet Row Most Recent Value   Blood Pressure 108/77 filed at 07/28/2023 3425   Patient Position - Orthostatic VS Lying filed at 07/28/2023 0603            Intake/Output Summary (Last 24 hours) at 7/28/2023 0829  Last data filed at 7/28/2023 0240  Gross per 24 hour   Intake --   Output 2 ml   Net -2 ml       Invasive Devices     Peripheral Intravenous Line  Duration           Peripheral IV 07/28/23 Right Antecubital <1 day          Drain  Duration           Urethral Catheter Temperature probe 16 Fr. <1 day                Physical Exam  Vitals reviewed. Constitutional:       General: She is in acute distress. Appearance: She is ill-appearing and toxic-appearing. Neck:      Comments: +JVD  Cardiovascular:      Rate and Rhythm: Normal rate. Heart sounds: Murmur heard. Pulmonary:      Breath sounds: Rales present. Musculoskeletal:         General: Swelling present.    Skin:     Capillary Refill: Capillary refill takes more than 3 seconds. Comments: Dusky, cool   Neurological:      Mental Status: She is disoriented. Lab Results:   I have personally reviewed pertinent lab results. CBC with diff:   Results from last 7 days   Lab Units 07/28/23  0326   WBC Thousand/uL 10.29*   RBC Million/uL 5.65*   HEMOGLOBIN g/dL 15.2   HEMATOCRIT % 44.7   MCV fL 79*   MCH pg 26.9   MCHC g/dL 34.0   RDW % 22.0*   MPV fL 10.8   PLATELETS Thousands/uL 149     CMP:   Results from last 7 days   Lab Units 07/28/23  0326   SODIUM mmol/L 121*   CHLORIDE mmol/L 89*   CO2 mmol/L 18*   BUN mg/dL 28*   CREATININE mg/dL 1.29   CALCIUM mg/dL 8.7   AST U/L 27   ALT U/L 28   ALK PHOS U/L 152*   EGFR ml/min/1.73sq m 41     HS Troponin:   0   Lab Value Date/Time    HSTNI0 39 07/28/2023 0326    HSTNI2 33 07/28/2023 0902     BNP:   Results from last 7 days   Lab Units 07/28/23  0326   POTASSIUM mmol/L 4.3   CHLORIDE mmol/L 89*   CO2 mmol/L 18*   BUN mg/dL 28*   CREATININE mg/dL 1.29   CALCIUM mg/dL 8.7   EGFR ml/min/1.73sq m 41     Coags:   Results from last 7 days   Lab Units 07/28/23  0326   PTT seconds 36   INR  1.90*     TSH:     Magnesium:   Results from last 7 days   Lab Units 07/28/23  0326   MAGNESIUM mg/dL 1.7*     Lipid Profile:     Imaging: I have personally reviewed pertinent reports. Echo 4/24/2023:     •  Left Ventricle: Left ventricular cavity size is dilated. Wall thickness is normal. The left ventricular ejection fraction is 20%. Systolic function is severely reduced. Wall motion cannot be accurately assessed. Left atrial filling pressure is elevated. Indeterminate diastolic function. •  Right Ventricle: Right ventricle is not well visualized. Systolic function appears reduced on limited views. •  Left Atrium: The atrium is dilated. •  Aortic Valve: The leaflets are moderately thickened. The leaflets are moderately calcified. There is mild regurgitation. There is severe stenosis based on DVI.  There is likely low flow/low gradient aortic stenosis. AV peak velocity 2.1 m/s. AV mean gradient 9 mmHg. DVI 0.19.  •  Mitral Valve: There is mild calcification. The leaflets exhibit normal mobility. There is moderate regurgitation. There is no evidence of stenosis. •  Tricuspid Valve: Tricuspid valve structure is normal. There is moderate regurgitation. There is no evidence of stenosis. The right ventricular systolic pressure is mildly elevated. The estimated right ventricular systolic pressure is 73.66 mmHg. •  Pulmonic Valve: The pulmonic valve was not well visualized. There is mild regurgitation. There is no evidence of stenosis. •  Aorta: The aortic root is normal in size. The aortic root is 2.80 cm. •  Prior TTE study available for comparison. Prior study date: 6/7/2022. Changes noted when compared to prior study. Changes include: EF previously 40% with moderate aortic stenosis. No aortic regurgitation seen on prior study. Mild to moderate MR on prior study. No TR reported previously. Previous DVI 0.33 consistent with moderate aortic stenosis.  .      EKG: Normal sinus rhythm  Low voltage QRS  Cannot rule out Anteroseptal infarct (cited on or before 28-JUL-2023)  Abnormal ECG  When compared with ECG of 28-JUL-2023 04:05, (unconfirmed)  Premature ventricular complexes are no longer Present  Confirmed by Sabre ([id number]) on 7/28/2023 8:48:11 AM

## 2023-07-28 NOTE — CONSULTS
[address] 67 y.o. female MRN: [id number]  Unit/Bed#: -01 Encounter: [id number]    ASSESSMENT and PLAN:  Kip Monday is a 67 y.o. female who was admitted to [facility] after presenting with weakness. A renal consultation is requested today for assistance in the management of hyponatremia.     # Hyponatremia  - Rapid response team was called this morning due to altered mental status and inability to get vital signs  - Work-up is currently pending for altered mental status including infectious work-up and checking for ammonia level to rule out hepatic encephalopathy  - If above work-up remains negative and patient remains confused and serum sodium remains low in this range as on admission would favor using hypertonic saline  - Serum sodium on admission 121  - Cardiac BNP 2300  - CT showing diffuse subcutaneous edema of left chest, abdomen and pelvic wall  - CT scan also showed cirrhotic liver  - Etiology of hyponatremia most likely in setting of volume overload due to underlying CHF exacerbation +/- cirrhosis can also contribute to hyponatremia  - Agree with checking serum osmolality to confirm hypotonic hyponatremia  - Agree with checking urine osmolality to check if hyponatremia is ADH dependent  - Agree with checking urine sodium to check for RAAS activity  - Check TSH/a.m. cortisol and serum uric acid  - Status post IV Lasix 40 mg x 1 in ED  - Currently on IV Lasix 80 mg 3 times daily per primary team  - Hold spironolactone as it can contribute to hyponatremia  - Trend BMP every 6 hours  - Goal serum sodium no more than 127 by tomorrow morning    # Acute kidney injury  - Baseline creatinine appears to be on 0.8-1.0  - Creatinine on admission 1.29  - Urinalysis: 2+ protein, 2-4 RBC, 1-2 WBC  - Imaging: No hydronephrosis  - Most likely etiology is cardiorenal syndrome/volume overload  - Trend creatinine with ongoing diuresis  - No indication for renal replacement therapy  - Trend BMP    # History of hypertension  - Home medications include Toprol- mg twice daily, Aldactone 25 mg daily, furosemide 80 mg in the morning and 40 mg in the evening  - Hold Aldactone as above  - Continue Toprol- mg twice daily and IV diuretics     # Volume  - She has a history of systolic and diastolic congestive heart failure  - Cardiac BNP 2300 on admission  - On physical examination she has significant lower extremity edema with anasarca-like picture  - Agree with intravenous diuretics    # Encephalopathy  - Multifactorial etiology, can be related to hyponatremia as above  - Urinalysis is currently pending and will rule out urinary tract infection  - Ammonia level also pending to rule out hepatic encephalopathy  - Discussed with ICU team and if she remains altered and serum sodium remains low at this level, would favor using 3% saline    Discussed with internal medicine team and ICU team.  After discussion, we agreed to repeat lab work and complete work-up for other causes of encephalopathy and if work-up remains unrevealing and serum sodium remains low to use 3% saline. HISTORY OF PRESENT ILLNESS:  Requesting Physician: Arina Reyes MD  Reason for Consult: Hyponatremia    Rosalva Christensen is a 67 y.o. female who was admitted to [facility] after presenting with weakness. A renal consultation is requested today for assistance in the management of hyponatremia. Patient arrived from home has increased weakness over the last few days. Has not gotten out of couch over the last few days. She has increased swelling in her legs and abdomen. Also noted to have increased swelling in the left breast region. Has not been taking her medications. This morning appears confused and difficult to get review of systems.     PAST MEDICAL HISTORY:  Past Medical History:   Diagnosis Date   • Arthritis    • CHF (congestive heart failure) ([address])    • Disease of thyroid gland PAST SURGICAL HISTORY:  Past Surgical History:   Procedure Laterality Date   • HYSTERECTOMY         ALLERGIES:  Allergies   Allergen Reactions   • Bupropion      Other reaction(s): Hyper  Other reaction(s): Hyper     • Prednisone    • Tyloxapol      Other reaction(s): halucination   • Penicillins Hives and Rash     Other reaction(s): hives       SOCIAL HISTORY:  Social History     Substance and Sexual Activity   Alcohol Use Never     Social History     Substance and Sexual Activity   Drug Use Never     Social History     Tobacco Use   Smoking Status Never   Smokeless Tobacco Never       FAMILY HISTORY:  History reviewed. No pertinent family history.     MEDICATIONS:    Current Facility-Administered Medications:   •  furosemide (LASIX) injection 80 mg, 80 mg, Intravenous, TID (diuretic), Constanza S Quirino, CRNP  •  insulin lispro (HumaLOG) 100 units/mL subcutaneous injection 2-12 Units, 2-12 Units, Subcutaneous, TID AC **AND** Fingerstick Glucose (POCT), , , TID AC, Constanza S Quirino, CRNP  •  insulin lispro (HumaLOG) 100 units/mL subcutaneous injection 2-12 Units, 2-12 Units, Subcutaneous, HS, Constanza S Quirino, CRNP  •  levothyroxine tablet 175 mcg, 175 mcg, Oral, Early Morning, Constanza S Quirino, CRNP  •  magnesium sulfate IVPB (premix) SOLN 1 g, 1 g, Intravenous, Once, Constanza S Quirino, CRNP    REVIEW OF SYSTEMS:  Review of Systems   Unable to perform ROS: Mental status change     PHYSICAL EXAM:  Current Weight: Weight - Scale: 135 kg (296 lb 15.4 oz)  First Weight: Weight - Scale: 135 kg (296 lb 15.4 oz)  Vitals:    07/28/23 0603 07/28/23 0724 07/28/23 0830 07/28/23 0831   BP: 106/56 108/77  126/65   BP Location: Right arm      Pulse: 81 80 81    Resp: 22 22 (!) 24    Temp: 97.7 °F (36.5 °C) (!) 97.3 °F (36.3 °C)     TempSrc: Temporal      SpO2: 97% 95% 97%    Weight:       Height:           Intake/Output Summary (Last 24 hours) at 7/28/2023 0844  Last data filed at 7/28/2023 0240  Gross per 24 hour   Intake --   Output 2 ml Net -2 ml     Physical Exam  Constitutional:       Appearance: She is ill-appearing. HENT:      Head: Normocephalic and atraumatic. Mouth/Throat:      Mouth: Mucous membranes are moist.      Pharynx: Oropharynx is clear. Cardiovascular:      Rate and Rhythm: Normal rate and regular rhythm. Pulses: Normal pulses. Heart sounds: Normal heart sounds. Pulmonary:      Effort: Pulmonary effort is normal.      Breath sounds: Normal breath sounds. Abdominal:      General: Bowel sounds are normal.      Palpations: Abdomen is soft. Musculoskeletal:         General: Normal range of motion. Right lower leg: Edema present. Left lower leg: Edema present. Skin:     General: Skin is warm and dry. Neurological:      Mental Status: She is alert. She is disoriented. Invasive Devices:   Urethral Catheter Temperature probe 16 Fr. (Active)   Amt returned on insertion(mL) 2 mL 07/28/23 0240   Reasons to continue Urinary Catheter  Accurate I&O assessment in critically ill patients (48 hr. max); Stage III/IV sacral ulcers or open perineal wounds in incontinent patients 07/28/23 0240   Site Assessment Clean;Red 07/28/23 0240     Lab Results:   Results from last 7 days   Lab Units 07/28/23  0326   WBC Thousand/uL 10.29*   HEMOGLOBIN g/dL 15.2   HEMATOCRIT % 44.7   PLATELETS Thousands/uL 149   POTASSIUM mmol/L 4.3   CHLORIDE mmol/L 89*   CO2 mmol/L 18*   BUN mg/dL 28*   CREATININE mg/dL 1.29   CALCIUM mg/dL 8.7   MAGNESIUM mg/dL 1.7*   ALK PHOS U/L 152*   ALT U/L 28   AST U/L 27     Portions of the record may have been created with voice recognition software. Occasional wrong word or "sound a like" substitutions may have occurred due to the inherent limitations of voice recognition software. Read the chart carefully and recognize, using context, where substitutions have occurred. If you have any questions, please contact the dictating provider.

## 2023-07-28 NOTE — RAPID RESPONSE
Rapid Response Note  Demetrice Elias 67 y.o. female MRN: [id number]  Unit/Bed#: -01 Encounter: [id number]    Rapid Response Notification(s):   Response called date/time:  7/28/2023 8:25 AM  Response team arrival date/time:  7/28/2023 8:27 AM  Response end date/time:  7/28/2023 8:45 AM  Level of care:  [facility]  Rapid response location:  [facility] unit  Primary reason for rapid response call:  Acute change in neuro status and other (comment) (unable to obtain vitals)    Rapid Response Intervention(s):   Airway:  None  Breathing:  None  Fluids administered:  None  Medications administered:  None       Assessment:   · Encephalopathy. Differential: toxic metabolic, hepatic, hyponatremia, infectious, hypoperfusion    Plan:   · Stat ABG, lactic, ammonia. · Transfer to [facility] under CC service to expedite encephalopathy workup. Patient has poor access which is delaying obtaining lab work as well as has clinical signs of hypoperfusion. · Requested primary team to switch LE arterial duplex from routine to stat. Will call vascular tech to expedite imaging. Vascular checks q1hr  · Spoke w/ nephrology. Do not suspect hyponatremia to be the main driving factor causing encephalopathy. Will hold on hypertonic saline at this point in time. · D/w primary team plan to transfer to [facility]. Daughter notified. Rapid Response Outcome:   Transfer:  Transfer to stepdown 1  Primary service notified of transfer: Yes    Code Status: Level 3 (DNAR and DNI)      Family notified of transfer: yes  Family member contacted: Daughter     Background/Situation:   Demetrice Elias is a 67 y.o. female h/o combined systolic/diastolic heart failure, Severe AS, CAD, PAF on coumadin, morbid obesity (BMI 50), Diabetes (non-insulin dependent), known liver cirrhosis, ambulatory dysfunciton admitted on 7/28 for encephalopathy.  Per H&P, patient's daughter brought patient in for increased confusion, decreased oral intake over past week and reported nonadherance to prescribed essential medications such as her diuretics. A rapid response was called this am when primary team was called to bedside to evaluate patient for ongoing encephalopathy and the inability to obtain vital signs. Once obtained, O2 sat was reportedly "low". Upon arrival, found patient awake however with nonsensical speech. Rapid neuro exam revealed no identifiable focal deficits. Vitals stable and sating well. 's. Exam reveals very cold distal extremities with mottling LE b/l. Per bedside nurse, patient has had the same mental status exam since arrival to the floor this early morning at 5am and had dopplerable pulses at 5am.     Review of Systems   Unable to perform ROS: Mental status change   All other systems reviewed and are negative. Objective:   Vitals:    07/28/23 0724 07/28/23 0830 07/28/23 0831 07/28/23 0904   BP: 108/77  126/65    BP Location:       Pulse: 80 81     Resp: 22 (!) 24     Temp: (!) 97.3 °F (36.3 °C)      TempSrc:       SpO2: 95% 97%     Weight:       Height:    5' 4" (1.626 m)     Physical Exam  Vitals reviewed. Constitutional:       Appearance: She is morbidly obese. She is ill-appearing. Interventions: Nasal cannula in place. HENT:      Head: Normocephalic. Cardiovascular:      Rate and Rhythm: Normal rate and regular rhythm. Pulses: Decreased pulses. Dorsalis pedis pulses are 0 on the right side and 0 on the left side. Posterior tibial pulses are 1+ on the right side and 1+ on the left side. Heart sounds: Murmur heard. Pulmonary:      Effort: Tachypnea present. Breath sounds: Decreased breath sounds present. Abdominal:      General: Abdomen is protuberant. Palpations: Abdomen is soft. Genitourinary:     Comments: [place] in place. Clear urine. Minimal output  Skin:     General: Skin is cool. Capillary Refill: Capillary refill takes more than 3 seconds. Coloration: Skin is mottled. Comments: Cold distal extrem. Chronic venous stasis changes present   Neurological:      General: No focal deficit present. GCS: GCS eye subscore is 3. GCS verbal subscore is 4. GCS motor subscore is 6. Portions of the record may have been created with voice recognition software. Occasional wrong word or "sound a like" substitutions may have occurred due to the inherent limitations of voice recognition software. Read the chart carefully and recognize, using context, where substitutions have occurred.     Virginia Arce

## 2023-07-28 NOTE — ACP (ADVANCE CARE PLANNING)
Critical Care Advanced Care Planning Note  Michele Meléndez 67 y.o. female MRN: [id number]  Unit/Bed#: -01 Encounter: [id number]    Michele Meléndez is a 67 y.o. female requiring critical care evaluation and advanced care planning. The patient has chronic comorbidities, including but not limited to HFrEF (LVEF 20-30% range), bed bound, anxiety/depression, which is now further complicated by the following acute conditions: Further. Due to the severity of the patient's acute condition and/or the extent of chronic conditions, additional conversations pertaining to advanced care planning were required. Today's discussion, which was held in a face-to-face meeting, included Daughter and POA (Randa Banda), Granddaughter, [place], Son, Case management (Radha Gonsales), Senior Services of [address] [place] [address] manager Niles Cranker), and it was established that all stake holders understood the rationale for the advanced care planning. The patient was unable to participate in the discussion due to encephalopathy - although she was able to answer some yes/no questions, she appeared to encephalopathic to be able to interact fully in the discussion. Summary of Discussion:  - I discussed the patient's recent course at home. Per the daughter Randa Banda), she is usually the patients primary care giver and has worked for many months to provide the patient with her medications. Unfortunately, the patient has become increasingly dissatisfied with her quality of life and expressed disinterest in further medical care. The daughter recently left for a trip whereupon she left her mother in the care of other family members. Upon her return, she noted a significant deterioration in her mother's health.  She desired to take her mother to the hospital, but she indicates her mother refused going to the hospital for several days until 7/27/2023.  - Randa Banda also indicated that she contact the [address] [place] [address] office for senior services to request additional support and complain that she felt her mother had suffered from an insufficient level of care during her abscence  - I described the patient's medical down-turn since arriving in the hospital. Specifically, she has become increasingly encephalopathic with a rising lactate, reduced perfusion of her extremities, and has be come aneuric over the last 12 hours. I shared that these findings are highly concerning for cardiogenic shock and that very serious renal injury. - I described the methods for treating cardiogenic shock with renal failure which include aggressive diuresis, invasive hemodynamic monitoring, initiation of inodilators or other vasoactive agents, placement of a central line for administration of vasoactive agents, possible initiation of hemodialysis, possible placement of a pulmonary artery catheter, possible transfer to a cardiogenic shock center for advanced heart failure consultative support. - The family indicated that all of these interventions would be completely opposed to the patient's wishes and that she desired to pass comfortably at home. The patient seemed to confirm this assessment although her encephalopathy precluded a complete discussion of risks and benefits.  - The family indicated that she furthermore would not wish for CPR or intubation and would like to transition to comfort care. - They were more open to the possibility of trialling very high doses of diuretics without hemodynamic monitoring or other support either before or after the transition to comfort care. - I shared this discussion with [facility] case management Harpal Clement). She shared her concerns regarding returning the patient to home given the open investigation regarding inadequate care at the patient's home. She also contacted the [facility] office for senior services where she spoke to one of the [facility].  Dat Wagner confirmed that even in cases where an investigation is occurring, that the next of kin continue to provide the authoritative guidance for goals of care discussion and moving to comfort care. - I returned to discuss this with the family who again expressed interest in returning the patient to home with home hospice. They also were open to the possibility of trialing a very high dose of diuretics to see if the patient's cardiac and renal function would improve since these were non-invasive interventions. They desired the patient to remain on comfort care. - I indicated that the discussion regarding hospice care would be a discussion between those services as well as case management with the family. I would perform the trial of very high doses of diuretics and inform the family of the outcome of this attempt within several hours where they would provide further guidance regarding further interventions for the patient. Total time spent, (45) minutes (1135 to 1220). CODE STATUS: COMFORT CARE - Level 4 - With trial of max dose diuretics x1.   POA:    POLST:        SIGNATURE: Pooja Bower MD PhD  DATE: July 28, 2023  TIME: 12:08 PM

## 2023-07-28 NOTE — ASSESSMENT & PLAN NOTE
· Sodium 121, this is new as previous labs have been in normal range  · Suspected due to overt hypervolemia and dilution  · IV diuresis  · Check urine sodium, urine osmolality, serum osmolality  · May be cause of patient's encephalopathy

## 2023-07-28 NOTE — PROCEDURES
Get labs  Cont xarelto   followup with cardiology  Can try debrox drops in ears for earwax  Cont BP meds  Cont paroxetine for depression/anxiety  F/u 6 months   POC Cardiac US    Date/Time: 7/28/2023 10:20 AM    Performed by: Branden Garibay MD PhD  Authorized by: Branden Garibay MD PhD    Patient location:  ICU  Procedure details:     Exam Type:  Diagnostic    Indications: respiratory distress      Indications comment:  Suspected volume overload with cardiogenic shock    Assessment / Evaluation for: cardiac function and pericardial effusion      Exam Type: initial exam      Image quality: limited diagnostic      Image availability:  Video obtained  Patient Details:     Cardiac Rhythm:  Regular    Mechanical ventilation: No    Cardiac findings:     Echo technique: limited 2D      Views obtained: parasternal long axis and parasternal short axis      Pericardial effusion: absent      Tamponade physiology: absent      Wall motion: hypodynamic      LV systolic function: depressed      LV systolic function comment:  Estimated at 15-20%    RV dilation comment:  RV moderate to severaly dilated with reduced systolic function  Interpretation:     Fluid Status:  Hypervolemic

## 2023-07-28 NOTE — CASE MANAGEMENT
Case Management Discharge Planning Note    Patient name [facility]  Location /-29 MRN [id number]  : 1950 Date 2023       Current Admission Date: 2023  Current Admission Diagnosis:Acute encephalopathy   Patient Active Problem List    Diagnosis Date Noted   • Acute hepatic encephalopathy ([address]) 2023   • Acute encephalopathy 2023   • Liver failure ([address]) 2023   • Acute hyponatremia 2023   • Low serum magnesium level 2023   • Acute decompensated heart failure ([address]) 2023   • Ambulatory dysfunction    • Metabolic acidosis    • Hyperbilirubinemia 2023   • Acute kidney injury ([address]) 2023   • Decreased pedal pulses 2023   • Abnormal CT scan 2023   • Supratherapeutic INR 2023   • Disease of thyroid gland    • Combined systolic and diastolic congestive heart failure ([address])    • Type 2 diabetes mellitus, without long-term current use of insulin (HCC)    • Atrial fibrillation (HCC)    • Anxiety and depression    • Nausea and vomiting    • Elevated LFTs       LOS (days): 0  Geometric Mean LOS (GMLOS) (days): 3.90  Days to GMLOS:3.4     OBJECTIVE:  Risk of Unplanned Readmission Score: 21.81         Current admission status: Inpatient   Preferred Pharmacy:   [address] [place] [address]  Phone: [phone] Fax: [phone]    Primary Care Provider: Janki Mock MD    Primary Insurance: MEDICARE  Secondary Insurance:     DISCHARGE DETAILS:     CM met with patient, family and daughter, Isaiah Soriano, at bedside, along with MD Dai Slater. MD reported patient will begin Comfort Care measures at [facility] and be monitored for 24 - 48 hours. CM to follow.      Isaiah Soriano, daughter, reported to CM she is concerned her sister, Rajendra Hi, is going to cause problems for the family as she has not helped in caring for patient and is now judging patients care while in [facility] home. Smith Uriostegui wants to apologize if family conflict arises while patient at [address] Smith Uriostegui indicated to  she knows she cannot care for patient should she return home in her current medical state.

## 2023-07-28 NOTE — ASSESSMENT & PLAN NOTE
Lab Results   Component Value Date    HGBA1C 6.8 (H) 04/18/2023       Recent Labs     07/28/23  0827   POCGLU 135       Blood Sugar Average: Last 72 hrs:  (P) 135     · Home med: metformin. · Hold.  Transition to SSI

## 2023-07-28 NOTE — ED TRIAGE NOTES
Arrived via EMS from home, family report increasing weakness and confusion over the past few days, only lying on right side.

## 2023-07-28 NOTE — PROCEDURES
Midline Insertion    Date/Time: 7/28/2023 3:00 AM    Performed by: VLAD Mendoza  Authorized by: VLAD Mendoza    Patient location:  ED  Consent:     Consent obtained:  Verbal    Consent given by:  Patient    Risks discussed:  Arterial puncture, bleeding, incorrect placement, infection, pneumothorax and nerve damage  Universal protocol:     Procedure explained and questions answered to patient or proxy's satisfaction: yes      Relevant documents present and verified: yes      Site/side marked: yes      Immediately prior to procedure, a time out was called: yes      Patient identity confirmed:  Arm band  Pre-procedure details:     Hand hygiene: Hand hygiene performed prior to insertion      Sterile barrier technique: All elements of maximal sterile technique followed      Skin preparation:  ChloraPrep    Skin preparation agent: Skin preparation agent completely dried prior to procedure    Indications:     Midline indications: no peripheral vascular access    Anesthesia (see MAR for exact dosages): Anesthesia method:  None  Procedure details:     Location:  Brachial and left cephalic    Laterality:  Left    Catheter size:  20 gauge    Landmarks identified: yes      Ultrasound guidance: yes      Ultrasound image availability:  Not saved    Sterile ultrasound techniques: Sterile gel and sterile probe covers were used      Number of attempts:  3    Successful placement: no    Post-procedure details:     Post-procedure:  Dressing applied  Comments:      Midline attempted x3-unsuccessful, unable to thread x2. 20 g 1 3/4 length PIV placed in brachial at [facility].     US Guided Peripheral IV    Date/Time: 7/28/2023 3:45 AM    Performed by: VLAD Mendoza  Authorized by: VLAD Mendoza    Patient location:  ED  Performed by:  NP/PA  Indications:     Indications: difficulty obtaining IV access      Image availability:  Not saved  Procedure details:     Patient evaluated for contraindications to access (i.e. fistula, thrombosis, etc): No      Standard clean technique used for ultrasound access: Yes      Location:  Left arm    Catheter size:  20 gauge    Number of attempts:  1    Successful placement: yes    Post-procedure details:     Post-procedure:  Dressing applied    Assessment: free fluid flow and no signs of infiltration      Post-procedure complications: none      Patient tolerance of procedure:   Tolerated well, no immediate complications

## 2023-07-28 NOTE — ASSESSMENT & PLAN NOTE
· Unclear etiology. · Head CT: (-) acute  · AB.43/28/150/18.5  · F/u ammonia level: Pending  · UA/culture: Pending  · Sodium: 121   · BCx2: pending  · Covid PCR negative  · TSH: pending  · T/c 2/2 hypoperfusion from decompensated heart failure  · Neuro checks, CAM-ICU.  Will transfer to [facility] under CC service for ongoing workup/management

## 2023-07-28 NOTE — ASSESSMENT & PLAN NOTE
· H/o same. · Ongoing workup in process with outpatient GI/hepatology  · MRI: 1. Mild hepatic steatosis. Cirrhotic morphology was better visualized on ultrasound. Echogenic lesion seen on ultrasound is not definitely visualized given limitations of the study. No definite suspicious mass is visualized and this may have represented focal fat. 3.  Mildly increased abdominopelvic ascites and diffuse subcutaneous edema.   · AST 27/ALT 29

## 2023-07-28 NOTE — ASSESSMENT & PLAN NOTE
Wt Readings from Last 3 Encounters:   07/28/23 135 kg (296 lb 15.4 oz)   04/28/23 120 kg (265 lb 3.4 oz)   04/25/23 120 kg (265 lb 3.4 oz)   · Echo 4/24/23 EF 20%. Severe AS (AV peak velocity 2.1 m/s. AV mean gradient 9 mmHg. DVI 0.19). Moderate TR  • She follows with cardiology at [facility] for CAD, AS, cardiomyopathy, and CHF. She was hospitalized in 2018 with CHF. She has had cardiac cath twice in the past. She had MI in her 25s. • GDMT: 20mg lasix, losartan,metoprolol. Single lead ICD in place. • Cardiology consulted this admission. • Clinically appears volume overloaded. Ordered 80mg lasix IV tid by cardiology  • Monitor UO (reported oliguric), daily weights  • Distal extremities cool to touch, unknown if acute on chronic however has venous stasis changes suggestive of chronicity. Currently LFTs/BP/renal function stable. Check lactic. Monitor for development of cardiogenic shock.

## 2023-07-28 NOTE — ASSESSMENT & PLAN NOTE
· POA from home with confusion worsening over the past week beginning on Sunday  · CT brain no acute abnormality  · UA pending  · Patient overtly hypervolemic with elevated bilirubin, ammonia level pending  · Possible secondary to hyponatremia  · Neurological exam nonfocal  · Continue to monitor neuro status

## 2023-07-28 NOTE — ASSESSMENT & PLAN NOTE
Wt Readings from Last 3 Encounters:   07/28/23 135 kg (296 lb 15.4 oz)   04/28/23 120 kg (265 lb 3.4 oz)   04/25/23 120 kg (265 lb 3.4 oz)     · Appears in acute decompensated heart failure with anasarca, orthopnea, not hypoxic on room air  · Weight 30 pounds increased from April   · Livedo reticularis bilateral feet concerning for worsening of cardiac output  · BNP and total bilirubin elevated  · follows with [facility] cardiology per daughter on the telephone, I cannot connect to [facility] care everywhere to see cardiology records  · Previously review of PCP records patient was on furosemide 80 mg a.m. and 40 mg p.m. but patient no longer takes Lasix and states the cardiologist told her she only needs to take it as needed, I cannot verify this information at time of admission as there is not a current medication list available to the daughter and the patient is unable to state her medications  · Previously was on Aldactone 20 mg daily, metoprolol 100 mg twice daily, losartan 100 mg daily  · Admit to CHF pathway  · IV diuresis  · Intake output, daily weight  · 2 g sodium diet with fluid restriction  · Echocardiogram EF 20%/24/23- AICD in place   · Appreciate cardiology consultation

## 2023-07-28 NOTE — ASSESSMENT & PLAN NOTE
Lab Results   Component Value Date    HGBA1C 6.8 (H) 04/18/2023       No results for input(s): "POCGLU" in the last 72 hours.     Blood Sugar Average: Last 72 hrs:  · Metformin on hold  · Sliding scale insulin coverage with Accu-Cheks  · Hypoglycemia protocol  · Carbohydrate controlled diet  · Update hemoglobin A1c

## 2023-07-28 NOTE — ASSESSMENT & PLAN NOTE
· History PAF  · Records indicate metoprolol 100 mg twice daily-hold pending cardiology evaluation with soft blood pressures  · Sinus rhythm on EKG at time of admission  · Chronically anticoagulated with warfarin  · INR 1.9 on admission  · Goal INR 2-3-check daily  · We will need to verify warfarin dosing with cardiology when records available

## 2023-07-28 NOTE — CONSULTS
Consult received for CHF Ed. Pt admitted with confusion, acute encephalopathy. RRT called this AM, medical team currently with pt. Not appropriate for discussion at this time. Continue diet as ordered. Fluid restriction per MD. Will provide diet ed as appropriate at later time.

## 2023-07-28 NOTE — OCCUPATIONAL THERAPY NOTE
Occupational Therapy Cancel Note        Patient Name: Chris RADFORD Date: 7/28/2023 07/28/23 9113   Note Type   Note type Evaluation; Cancelled Session   Cancel Reasons Medical status       OT order received, chart review completed. Pt admitted to [address] [place] [address] on 7/28/23 w/ Dx: Acute encephalopathy. Patient rapid response this AM, not medically appropriate for OT at this time. Will continue to follow pt and provide care as pt is appropriate and available.     [employer], MS, OTR/L

## 2023-07-28 NOTE — ED PROVIDER NOTES
History  Chief Complaint   Patient presents with   • Weakness - Generalized     Arrived from home. Increasing weakness over the last few days. Has not gotten off the couch over the last few days. Decreased appetite. No abdominal pain or back pain. Daughter now here. Seen more confused today. Increased swelling in her legs and abdomen. Increased swelling to the left breast.  Has not been taking her medications. History provided by:  Patient and EMS personnel   used: No    Fatigue  Severity:  Moderate  Onset quality:  Gradual  Duration:  4 days  Timing:  Constant  Progression:  Worsening  Chronicity:  New  Context: not allergies, not drug use and not stress    Relieved by:  Nothing  Worsened by:  Nothing  Ineffective treatments:  None tried  Associated symptoms: difficulty walking and lethargy    Associated symptoms: no abdominal pain, no chest pain, no cough, no diarrhea, no dizziness, no dysuria, no fever, no frequency, no headaches, no loss of consciousness, no nausea, no seizures, no shortness of breath and no vomiting        Prior to Admission Medications   Prescriptions Last Dose Informant Patient Reported?  Taking?   docusate sodium (COLACE) 100 mg capsule   No No   Sig: Take 1 capsule (100 mg total) by mouth 2 (two) times a day   furosemide (LASIX) 20 mg tablet   Yes No   Sig: Take 20 mg by mouth as needed   levothyroxine 175 mcg tablet   Yes No   Sig: Take 1 tablet by mouth daily   losartan (COZAAR) 100 MG tablet   Yes No   Sig: Take 1 tablet by mouth daily   metFORMIN (GLUCOPHAGE-XR) 500 mg 24 hr tablet   Yes No   Sig: Take 500 mg by mouth daily with breakfast   metoprolol succinate (TOPROL-XL) 100 mg 24 hr tablet  Self Yes No   Sig: Take 100 mg by mouth 2 (two) times a day   ondansetron (Zofran ODT) 4 mg disintegrating tablet   No No   Sig: Take 1 tablet (4 mg total) by mouth every 6 (six) hours as needed for nausea or vomiting   pantoprazole (PROTONIX) 40 mg tablet   No No Sig: Take 1 tablet (40 mg total) by mouth 2 (two) times a day for 14 days, THEN 1 tablet (40 mg total) daily. spironolactone (ALDACTONE) 25 mg tablet  Self Yes No   Sig: Take 25 mg by mouth daily   sucralfate (CARAFATE) 1 g/10 mL suspension   No No   Sig: Take 10 mL (1 g total) by mouth 4 (four) times a day   venlafaxine (EFFEXOR-XR) 150 mg 24 hr capsule   Yes No   Sig: Take 1 capsule by mouth daily   warfarin (COUMADIN) 5 mg tablet  Self Yes No   Sig: Take 5 mg by mouth daily 2.5mg tues/thurs, 5mg rest of days      Facility-Administered Medications: None       Past Medical History:   Diagnosis Date   • Arthritis    • CHF (congestive heart failure) (HCC)    • Disease of thyroid gland        Past Surgical History:   Procedure Laterality Date   • HYSTERECTOMY         History reviewed. No pertinent family history. I have reviewed and agree with the history as documented. E-Cigarette/Vaping   • E-Cigarette Use Never User      E-Cigarette/Vaping Substances     Social History     Tobacco Use   • Smoking status: Never   • Smokeless tobacco: Never   Vaping Use   • Vaping Use: Never used   Substance Use Topics   • Alcohol use: Never   • Drug use: Never       Review of Systems   Constitutional: Positive for appetite change and fatigue. Negative for chills and fever. HENT: Negative for ear pain, hearing loss, sore throat, trouble swallowing and voice change. Eyes: Negative for pain and discharge. Respiratory: Negative for cough, shortness of breath and wheezing. Cardiovascular: Negative for chest pain and palpitations. Gastrointestinal: Negative for abdominal pain, blood in stool, constipation, diarrhea, nausea and vomiting. Genitourinary: Negative for dysuria, flank pain, frequency and hematuria. Musculoskeletal: Negative for joint swelling, neck pain and neck stiffness. Skin: Negative for rash and wound. Neurological: Positive for weakness.  Negative for dizziness, seizures, loss of consciousness, syncope, facial asymmetry and headaches. Psychiatric/Behavioral: Negative for hallucinations, self-injury and suicidal ideas. All other systems reviewed and are negative. Physical Exam  Physical Exam  Vitals and nursing note reviewed. Constitutional:       General: She is not in acute distress. Appearance: She is well-developed. HENT:      Head: Normocephalic and atraumatic. Right Ear: External ear normal.      Left Ear: External ear normal.      Mouth/Throat:      Mouth: Mucous membranes are dry. Eyes:      General: No scleral icterus. Right eye: No discharge. Left eye: No discharge. Extraocular Movements: Extraocular movements intact. Conjunctiva/sclera: Conjunctivae normal.   Cardiovascular:      Rate and Rhythm: Normal rate and regular rhythm. Heart sounds: Normal heart sounds. No murmur heard. Pulmonary:      Effort: Pulmonary effort is normal.      Breath sounds: Normal breath sounds. No wheezing or rales. Comments: Swelling to left breast noted without any surrounding erythema. Abdominal:      General: Bowel sounds are normal. There is no distension. Palpations: Abdomen is soft. Tenderness: There is no abdominal tenderness. There is no guarding or rebound. Musculoskeletal:         General: No deformity. Normal range of motion. Cervical back: Normal range of motion and neck supple. Right lower leg: Edema present. Left lower leg: Edema present. Skin:     General: Skin is warm and dry. Findings: No rash. Comments: Dusky feet. Normal per family members. Pressure sores beginning on right side of body by hip. Neurological:      General: No focal deficit present. Mental Status: She is alert. Cranial Nerves: No cranial nerve deficit. Psychiatric:         Mood and Affect: Mood normal.         Behavior: Behavior normal.         Thought Content:  Thought content normal.         Judgment: Judgment normal. Vital Signs  ED Triage Vitals [07/28/23 0246]   Temperature Pulse Respirations Blood Pressure SpO2   (!) 96.9 °F (36.1 °C) 83 (!) 26 108/79 99 %      Temp Source Heart Rate Source Patient Position - Orthostatic VS BP Location FiO2 (%)   Rectal Monitor Lying Right arm --      Pain Score       --           Vitals:    07/28/23 0246 07/28/23 0333 07/28/23 0415   BP: 108/79 111/80 121/87   Pulse: 83 85 83   Patient Position - Orthostatic VS: Lying Lying Lying         Visual Acuity      ED Medications  Medications   furosemide (LASIX) injection 40 mg (40 mg Intravenous Given 7/28/23 0412)       Diagnostic Studies  Results Reviewed     Procedure Component Value Units Date/Time    FLU/RSV/COVID - if FLU/RSV clinically relevant [[id number]]  (Normal) Collected: 07/28/23 0326    Lab Status: Final result Specimen: Nares from Nose Updated: 07/28/23 0430     SARS-CoV-2 Negative     INFLUENZA A PCR Negative     INFLUENZA B PCR Negative     RSV PCR Negative    Narrative:      FOR PEDIATRIC PATIENTS - copy/paste COVID Guidelines URL to browser: [email or web address]    SARS-CoV-2 assay is a Nucleic Acid Amplification assay intended for the  qualitative detection of nucleic acid from SARS-CoV-2 in nasopharyngeal  swabs. Results are for the presumptive identification of SARS-CoV-2 RNA. Positive results are indicative of infection with SARS-CoV-2, the virus  causing COVID-19, but do not rule out bacterial infection or co-infection  with other viruses. Laboratories within the [facility] and its  territories are required to report all positive results to the appropriate  public health authorities. Negative results do not preclude SARS-CoV-2  infection and should not be used as the sole basis for treatment or other  patient management decisions. Negative results must be combined with  clinical observations, patient history, and epidemiological information.   This test has not been FDA cleared or approved. This test has been authorized by FDA under an Emergency Use Authorization  (EUA). This test is only authorized for the duration of time the  declaration that circumstances exist justifying the authorization of the  emergency use of an in vitro diagnostic tests for detection of SARS-CoV-2  virus and/or diagnosis of COVID-19 infection under section 564(b)(1) of  the Act, 21 U. S.C. 961ZJB-9(C)(8), unless the authorization is terminated  or revoked sooner. The test has been validated but independent review by FDA  and CLIA is pending. Test performed using [employer] GeneXpert: This RT-PCR assay targets N2,  a region unique to SARS-CoV-2. A conserved region in the E-gene was chosen  for pan-Sarbecovirus detection which includes SARS-CoV-2. According to CMS-2020-01-R, this platform meets the definition of high-throughput technology. HS Troponin I 4hr [[id number]]     Lab Status: No result Specimen: Blood     HS Troponin I 2hr [[id number]]     Lab Status: No result Specimen: Blood     HS Troponin 0hr (reflex protocol) [[id number]]  (Normal) Collected: 07/28/23 0326    Lab Status: Final result Specimen: Blood from Arm, Left Updated: 07/28/23 0402     hs TnI 0hr 39 ng/L     B-Type Natriuretic Peptide(BNP) [[id number]]  (Abnormal) Collected: 07/28/23 0326    Lab Status: Final result Specimen: Blood from Arm, Left Updated: 07/28/23 0401     BNP 2,348 pg/mL     Lactic acid, plasma (w/reflex if result > 2.0) [[id number]]  (Abnormal) Collected: 07/28/23 0326    Lab Status: Final result Specimen: Blood from Arm, Left Updated: 07/28/23 0359     LACTIC ACID 2.8 mmol/L     Narrative:      Result may be elevated if tourniquet was used during collection.     Lactic acid 2 Hours [[id number]]     Lab Status: No result Specimen: Blood     Comprehensive metabolic panel [[id number]]  (Abnormal) Collected: 07/28/23 0326    Lab Status: Final result Specimen: Blood from Arm, Left Updated: 07/28/23 0358 Sodium 121 mmol/L      Potassium 4.3 mmol/L      Chloride 89 mmol/L      CO2 18 mmol/L      ANION GAP 14 mmol/L      BUN 28 mg/dL      Creatinine 1.29 mg/dL      Glucose 148 mg/dL      Calcium 8.7 mg/dL      Corrected Calcium 9.3 mg/dL      AST 27 U/L      ALT 28 U/L      Alkaline Phosphatase 152 U/L      Total Protein 5.3 g/dL      Albumin 3.2 g/dL      Total Bilirubin 4.61 mg/dL      eGFR 41 ml/min/1.73sq m     Narrative:      [facility] guidelines for Chronic Kidney Disease (CKD):   •  Stage 1 with normal or high GFR (GFR > 90 mL/min/1.73 square meters)  •  Stage 2 Mild CKD (GFR = 60-89 mL/min/1.73 square meters)  •  Stage 3A Moderate CKD (GFR = 45-59 mL/min/1.73 square meters)  •  Stage 3B Moderate CKD (GFR = 30-44 mL/min/1.73 square meters)  •  Stage 4 Severe CKD (GFR = 15-29 mL/min/1.73 square meters)  •  Stage 5 End Stage CKD (GFR <15 mL/min/1.73 square meters)  Note: GFR calculation is accurate only with a steady state creatinine    Magnesium [[id number]]  (Abnormal) Collected: 07/28/23 0326    Lab Status: Final result Specimen: Blood from Arm, Left Updated: 07/28/23 0354     Magnesium 1.7 mg/dL     Lipase [[id number]]  (Normal) Collected: 07/28/23 0326    Lab Status: Final result Specimen: Blood from Arm, Left Updated: 07/28/23 0354     Lipase 14 u/L     CK [[id number]]  (Normal) Collected: 07/28/23 0326    Lab Status: Final result Specimen: Blood from Arm, Left Updated: 07/28/23 0352     Total CK 29 U/L     Protime-INR [[id number]]  (Abnormal) Collected: 07/28/23 0326    Lab Status: Final result Specimen: Blood from Arm, Left Updated: 07/28/23 0350     Protime 21.9 seconds      INR 1.90    APTT [[id number]]  (Normal) Collected: 07/28/23 0326    Lab Status: Final result Specimen: Blood from Arm, Left Updated: 07/28/23 0350     PTT 36 seconds     Blood culture #1 [[id number]] Collected: 07/28/23 0336    Lab Status:  In process Specimen: Blood from Arm, Left Updated: 07/28/23 034    CBC and differential [[id number]]  (Abnormal) Collected: 07/28/23 0326    Lab Status: Final result Specimen: Blood from Arm, Left Updated: 07/28/23 0336     WBC 10.29 Thousand/uL      RBC 5.65 Million/uL      Hemoglobin 15.2 g/dL      Hematocrit 44.7 %      MCV 79 fL      MCH 26.9 pg      MCHC 34.0 g/dL      RDW 22.0 %      MPV 10.8 fL      Platelets 301 Thousands/uL      nRBC 2 /100 WBCs      Neutrophils Relative 85 %      Immat GRANS % 1 %      Lymphocytes Relative 11 %      Monocytes Relative 3 %      Eosinophils Relative 0 %      Basophils Relative 0 %      Neutrophils Absolute 8.75 Thousands/µL      Immature Grans Absolute 0.07 Thousand/uL      Lymphocytes Absolute 1.14 Thousands/µL      Monocytes Absolute 0.28 Thousand/µL      Eosinophils Absolute 0.03 Thousand/µL      Basophils Absolute 0.02 Thousands/µL     Blood culture #2 [[id number]] Collected: 07/28/23 0326    Lab Status: In process Specimen: Blood from Arm, Left Updated: 07/28/23 0333    UA w Reflex to Microscopic w Reflex to Culture [[id number]]     Lab Status: No result Specimen: Urine                  CT head without contrast   Final Result by Mary Sorensen MD (07/28 1561)      No acute intracranial abnormality. Mild chronic small vessel ischemic changes. Workstation performed: [id number]         CT chest abdomen pelvis wo contrast   Final Result by Mary Sorensen MD (07/28 8439)      Diffuse subcutaneous edema most extensive at the partially visualized left chest, abdominal and pelvic wall. The findings may be secondary to a fluid overloaded state. Other infectious/inflammatory etiologies cannot be excluded. Clinical correlation is    recommended. No soft tissue gas noted. Cirrhotic appearing liver with moderate intra-abdominal pelvic ascites. No acute intra-abdominal abnormality. Scattered colonic diverticulosis with no evidence of diverticulitis. No infiltrate or pleural effusion. Mild cardiomegaly. Workstation performed: [id number]                    Procedures  ECG 12 Lead Documentation Only    Date/Time: 7/28/2023 2:12 AM    Performed by: Monica Nunez MD  Authorized by: Monica Nunez MD    ECG reviewed by me, the ED Provider: yes    Patient location:  ED  Rate:     ECG rate:  80  Rhythm:     Rhythm: sinus rhythm    Ectopy:     Ectopy: none    QRS:     QRS axis:  Normal             ED Course  ED Course as of 07/28/23 0444   Fri Jul 28, 2023   0414 Patient with a history of elevated LFTs and bilirubin. Patient has had her gallbladder removed. 9932 Would hold off on any IV fluid boluses as the patient has an ejection fraction of only 20%. Appears edematous throughout. History of CHF. Not compliant with her Lasix. Medical Decision Making  Amount and/or Complexity of Data Reviewed  Labs: ordered. Decision-making details documented in ED Course. Radiology: ordered and independent interpretation performed. Decision-making details documented in ED Course. ECG/medicine tests: ordered and independent interpretation performed. Decision-making details documented in ED Course. Discussion of management or test interpretation with external provider(s): Differential diagnosis includes but not limited to PAM, rhabdomyolysis, electrolyte abnormality, CHF.         Disposition  Final diagnoses:   Hyponatremia   CHF (congestive heart failure) ([address])   Anasarca     Time reflects when diagnosis was documented in both MDM as applicable and the Disposition within this note     Time User Action Codes Description Comment    7/28/2023  3:54 AM Kelin Geiger Add [E87.1] Hyponatremia     7/28/2023  3:57 AM Thor Melody Add [R41.82] AMS (altered mental status)     7/28/2023  4:18 AM Kelin Geiger Add [I50.9] CHF (congestive heart failure) ([address])     7/28/2023  4:43 AM Kelin Geiger Add [R60.1] [address] [facility]       ED Disposition     ED Disposition   Admit Condition   Stable    Date/Time   Fri Jul 28, 2023  4:43 AM    Comment   Case was discussed with Constanza Win and the patient's admission status was agreed to be  to the service of Dr. Cayden Nicole. Follow-up Information    None         Patient's Medications   Discharge Prescriptions    No medications on file       No discharge procedures on file.     PDMP Review     None          ED Provider  Electronically Signed by           Layne Winn MD  07/28/23 9866

## 2023-07-29 PROBLEM — R57.9 SHOCK (HCC): Status: ACTIVE | Noted: 2023-07-29

## 2023-07-29 NOTE — ASSESSMENT & PLAN NOTE
Likely Cardiogenic  Transferred to ICU for further work up and treatment  After extensive discussion with family, they do not desire any invasive monitoring although they are OK with high-dose diuretic challenge which unfortunately was unsuccessful  See further details under heart failure a/p    · Distal extremities cool to touch, unknown if acute on chronic however has venous stasis changes suggestive of chronicity. Currently LFTs/BP/renal function stable. Check lactic. Monitor for development of cardiogenic shock. · POCUS EF 20%, formal echo ordered    Transitioned to comfort care. No further workup.

## 2023-07-29 NOTE — ASSESSMENT & PLAN NOTE
Lab Results   Component Value Date    HGBA1C 6.8 (H) 04/18/2023       Recent Labs     07/28/23  0827   POCGLU 135       Blood Sugar Average: Last 72 hrs:  (P) 135     · Home med: metformin. · Hold. Transition to [address] [facility]    Transitioned to comfort care.

## 2023-07-29 NOTE — ASSESSMENT & PLAN NOTE
· Personally evaluated. Unable to obtain dopplerable dorsalis pedus b/l. + posterior tibialis. · Distal extremities cool, mottled and discolored w/ presence of chronic venous stasis changes  · Routine arterial duplex study ordered by admitting team. Order changed to stat and I called the vascular tech to perform BRYNN ASAP    Transitioned to comfort care.

## 2023-07-29 NOTE — ASSESSMENT & PLAN NOTE
· Currently in NSR. Rate 78  · Managed on coumadin outpatient. · Hold and switch to heparin infusion for potential central line placement 2/2 limited access/multiple lab draws/ScVo2 check  · INR 1.9. Trend  · Continue metoprolol w/ hold parameters    Cardiopulmonary monitoring discontinued. Labs and meds discontinued. Transitioned to comfort care.

## 2023-07-29 NOTE — ASSESSMENT & PLAN NOTE
· Na 121. Suspected hypervolemic hyponatremia in setting of decompensated CHF  · TSH/cortisol/uric acid/urine osmol/urine sodium/serum osmol pending  · S/p 40mg lasix IV x1. Currently ordered 80mg tid   · Spirinolactone on hold  · Do not have strong suspicion encephalopathy 2/2 hyponatremia however considered. If rest of work up negative, will consider hypertonic. · BMP q6  · Goal serum sodium no more than 127 by 7/29 am    Further treatment and labs discontinued; transitioned to comfort care.

## 2023-07-29 NOTE — PLAN OF CARE
Problem: Nutrition/Hydration-ADULT  Goal: Nutrient/Hydration intake appropriate for improving, restoring or maintaining nutritional needs  Description: Monitor and assess patient's nutrition/hydration status for malnutrition. Collaborate with interdisciplinary team and initiate plan and interventions as ordered. Monitor patient's weight and dietary intake as ordered or per policy. Utilize nutrition screening tool and intervene as necessary. Determine patient's food preferences and provide high-protein, high-caloric foods as appropriate.      INTERVENTIONS:  - Monitor oral intake, urinary output, labs, and treatment plans  - Assess nutrition and hydration status and recommend course of action  - Evaluate amount of meals eaten  - Assist patient with eating if necessary   - Allow adequate time for meals  - Recommend/ encourage appropriate diets, oral nutritional supplements, and vitamin/mineral supplements  - Order, calculate, and assess calorie counts as needed  - Recommend, monitor, and adjust tube feedings and TPN/PPN based on assessed needs  - Assess need for intravenous fluids  - Provide specific nutrition/hydration education as appropriate  - Include patient/family/caregiver in decisions related to nutrition  Outcome: Not Progressing, patient comfort care

## 2023-07-29 NOTE — ASSESSMENT & PLAN NOTE
· Currently in NSR. Rate 78  · Managed on coumadin outpatient. · Hold and switch to heparin infusion for potential central line placement 2/2 limited access/multiple lab draws/ScVo2 check  · INR 1.9.   · Ordered metoprolol w/ hold parameters    Cardiopulmonary monitoring discontinued. Labs and meds discontinued. Transitioned to comfort care.

## 2023-07-29 NOTE — ASSESSMENT & PLAN NOTE
Lab Results   Component Value Date    HGBA1C 6.8 (H) 04/18/2023       Recent Labs     07/28/23  0827   POCGLU 135       Blood Sugar Average: Last 72 hrs:  (P) 135     · Home med: metformin. · Hold. Transition to [address] [facility]    Transitioned to comfort care.

## 2023-07-29 NOTE — ASSESSMENT & PLAN NOTE
· Suspected 2/2 ATN from low flow state. Reportedly oliguric  · Trend Renal function, UO    Transitioned to comfort care. Advancement Flap (Double) Text: The defect edges were debeveled with a #15 scalpel blade.  Given the location of the defect and the proximity to free margins a double advancement flap was deemed most appropriate.  Using a sterile surgical marker, the appropriate advancement flaps were drawn incorporating the defect and placing the expected incisions within the relaxed skin tension lines where possible.    The area thus outlined was incised deep to adipose tissue with a #15 scalpel blade.  The skin margins were undermined to an appropriate distance in all directions utilizing iris scissors.

## 2023-07-29 NOTE — ASSESSMENT & PLAN NOTE
· Suspected 2/2 ATN from low flow state. Reportedly oliguric  · Trend Renal function, UO    Transitioned to comfort care.

## 2023-07-29 NOTE — ASSESSMENT & PLAN NOTE
· Na 121. Suspected hypervolemic hyponatremia in setting of decompensated CHF  · TSH/cortisol/uric acid/urine osmol/urine sodium/serum osmol pending  · S/p 40mg lasix IV x1. High dose diuretics ordered - not responsive  · Spirinolactone on hold  · Do not have strong suspicion encephalopathy 2/2 hyponatremia however considered. If rest of work up negative, will consider hypertonic. Further treatment and labs discontinued; transitioned to comfort care.

## 2023-07-29 NOTE — ASSESSMENT & PLAN NOTE
· Unclear etiology. · Head CT: (-) acute  · AB.43/28/150/18.5  · F/u ammonia level: Pending  · UA/culture: Pending  · Sodium: 121   · BCx2: pending  · Covid PCR negative  · TSH: pending  · T/c 2/2 hypoperfusion from decompensated heart failure  · Neuro checks, CAM-ICU. Will transfer to [facility] under CC service for ongoing workup/management    Goals of care discussed with patient and daughter. Despite patient's encephalopathy, she was able to express her wishes and daughter agreed. Transitioned to comfort care.

## 2023-07-29 NOTE — ASSESSMENT & PLAN NOTE
· Unclear etiology. · Head CT: (-) acute  · AB.43/28/150/18.5  · F/u ammonia level: Pending  · UA/culture: Pending  · Sodium: 121   · BCx2: pending  · Covid PCR negative  · TSH: pending  · T/c 2/2 hypoperfusion from decompensated heart failure  · Neuro checks, CAM-ICU. · Transfer to [facility] under CC service for ongoing workup/management on   · GOC discussed and despite patient's encephalopathy, she was able to express her wishes to transition to comfort care and daughter agreed. Work up stopped.

## 2023-07-29 NOTE — PROGRESS NOTES
[address] [facility]  Progress Note  Name: Rafat Ge  MRN: [id number]  Unit/Bed#: -55 I Date of Admission: 2023   Date of Service: 2023 I Hospital Day: 1    Assessment/Plan   * Acute encephalopathy  Assessment & Plan  · Unclear etiology. · Head CT: (-) acute  · AB.43/28/150/18.5  · F/u ammonia level: Pending  · UA/culture: Pending  · Sodium: 121   · BCx2: pending  · Covid PCR negative  · TSH: pending  · T/c 2/2 hypoperfusion from decompensated heart failure  · Neuro checks, CAM-ICU. Will transfer to [facility] under CC service for ongoing workup/management    Goals of care discussed with patient and daughter. Despite patient's encephalopathy, she was able to express her wishes and daughter agreed. Transitioned to comfort care. Metabolic acidosis  Assessment & Plan  · AB.43/28/150/18.5  · Trend BMP q6h    Labs discontinued. Transitioned to comfort care. Acute kidney injury ([address])  Assessment & Plan  · Suspected 2/2 ATN from low flow state. Reportedly oliguric  · Trend Renal function, UO    Transitioned to comfort care. Acute decompensated heart failure [facility])  Assessment & Plan  Wt Readings from Last 3 Encounters:   23 135 kg (296 lb 15.4 oz)   23 120 kg (265 lb 3.4 oz)   23 120 kg (265 lb 3.4 oz)     · Echo 23 EF 20%. Severe AS (AV peak velocity 2.1 m/s. AV mean gradient 9 mmHg. DVI 0.19). Moderate TR  • She follows with cardiology at [facility] for CAD, AS, cardiomyopathy, and CHF. She was hospitalized in 2018 with CHF. She has had cardiac cath twice in the past. She had MI in her 25s. • GDMT: 20mg lasix, losartan,metoprolol. Single lead ICD in place. • Cardiology consulted this admission. • Clinically appears volume overloaded.  Ordered 80mg lasix IV tid by cardiology  • Monitor UO (reported oliguric), daily weights  • Concern to cardiogenic shock (see plan under shock)  • Trial high dose lasix and diuril - no response    Further workup and treatment discontinued - patient transitioned to comfort care. Acute hyponatremia  Assessment & Plan  · Na 121. Suspected hypervolemic hyponatremia in setting of decompensated CHF  · TSH/cortisol/uric acid/urine osmol/urine sodium/serum osmol pending  · S/p 40mg lasix IV x1. Currently ordered 80mg tid   · Spirinolactone on hold  · Do not have strong suspicion encephalopathy 2/2 hyponatremia however considered. If rest of work up negative, will consider hypertonic. · BMP q6  · Goal serum sodium no more than 127 by 7/29 am    Further treatment and labs discontinued; transitioned to comfort care. Shock [facility])  Assessment & Plan  Likely Cardiogenic  Transferred to ICU for further work up and treatment  After extensive discussion with family, they do not desire any invasive monitoring although they are OK with high-dose diuretic challenge which unfortunately was unsuccessful  See further details under heart failure a/p    · Distal extremities cool to touch, unknown if acute on chronic however has venous stasis changes suggestive of chronicity. Currently LFTs/BP/renal function stable. Check lactic. Monitor for development of cardiogenic shock. · POCUS EF 20%, formal echo ordered    Transitioned to comfort care. No further workup. Decreased pedal pulses  Assessment & Plan  · Personally evaluated. Unable to obtain dopplerable dorsalis pedus b/l. + posterior tibialis. · Distal extremities cool, mottled and discolored w/ presence of chronic venous stasis changes  · Routine arterial duplex study ordered by admitting team. Order changed to stat and I called the vascular tech to perform BRYNN ASAP    Transitioned to comfort care. Hyperbilirubinemia  Assessment & Plan  · H/o same. · Ongoing workup in process with outpatient GI/hepatology  · MRI: 1. Mild hepatic steatosis. Cirrhotic morphology was better visualized on ultrasound.   Echogenic lesion seen on ultrasound is not definitely visualized given limitations of the study. No definite suspicious mass is visualized and this may have represented focal fat. 3.  Mildly increased abdominopelvic ascites and diffuse subcutaneous edema. · AST 27/ALT 29    Transitioned to comfort care. Ambulatory dysfunction  Assessment & Plan  · PT/OT when stable    Discontinue consult. Transitioned to comfort care. Low serum magnesium level  Assessment & Plan  · Replete    Labs discontinued. Transitioned to comfort care. Atrial fibrillation ([address])  Assessment & Plan  · Currently in NSR. Rate 78  · Managed on coumadin outpatient. · Hold and switch to heparin infusion for potential central line placement 2/2 limited access/multiple lab draws/ScVo2 check  · INR 1.9. Trend  · Continue metoprolol w/ hold parameters    Cardiopulmonary monitoring discontinued. Labs and meds discontinued. Transitioned to comfort care. Type 2 diabetes mellitus, without long-term current use of insulin [facility])  Assessment & Plan  Lab Results   Component Value Date    HGBA1C 6.8 (H) 04/18/2023       Recent Labs     07/28/23  0827   POCGLU 135       Blood Sugar Average: Last 72 hrs:  (P) 135     · Home med: metformin. · Hold. Transition to [address] [facility]    Transitioned to comfort care. Disease of thyroid gland  Assessment & Plan  · Check TSH  · Continue synthroid    Transitioned to comfort care. ICU Core Measures     A: Assess, Prevent, and Manage Pain · Has pain been assessed? Yes  · Need for changes to pain regimen? No   B: Both SAT/SAT  · N/A   C: Choice of Sedation · RASS Goal: N/A patient not on sedation  · Need for changes to sedation or analgesia regimen? NA   D: Delirium · CAM-ICU: Unable to perform secondary to Acute cognitive dysfunction   E: Early Mobility  · Plan for early mobility? NA   F: Family Engagement · Plan for family engagement today? Yes       Review of Invasive Devices:     Selvin Plan: end of life care        Prophylaxis:  VTE    Stress Ulcer  not ordered Subjective   HPI/24hr events: Admitted 7/28 with acute encephalopathy and hyponatremia. RRT for acute encephalopathy and difficulty obtaining vitals. Transferred to ICU for suspicion of cardiogenic shock. Did not respond to high dose diuretics. Goals of care discussion while working up shock and decision was made to transition to comfort care with hospice consult. Multiple family members visiting throughout the day and night. Unable to obtain ROS due to mental status. Objective                            Vitals I/O      Most Recent Min/Max in 24hrs   Temp (!) 95.9 °F (35.5 °C) Temp  Min: 95.9 °F (35.5 °C)  Max: 98.1 °F (36.7 °C)   Pulse 70 Pulse  Min: 70  Max: 83   Resp (!) 26 Resp  Min: 21  Max: 26   BP 91/59 BP  Min: 82/68  Max: 126/65   O2 Sat (!) 83 % SpO2  Min: 83 %  Max: 98 %      Intake/Output Summary (Last 24 hours) at 7/29/2023 0342  Last data filed at 7/28/2023 2100  Gross per 24 hour   Intake 250 ml   Output 0 ml   Net 250 ml         Diet Regular; Pleasure Feed     Invasive Monitoring Physical exam    Physical Exam  Constitutional:       Comments: Chronically ill appearing   HENT:      Head: Normocephalic and atraumatic. Mouth/Throat:      Mouth: Mucous membranes are dry. Pulmonary:      Effort: Pulmonary effort is normal. No respiratory distress. Abdominal:      Palpations: Abdomen is soft. Tenderness: There is no abdominal tenderness. Musculoskeletal:      Right lower leg: Edema present. Left lower leg: Edema present. Skin:     Coloration: Skin is pale. Comments: Cool and cyanotic extremities   Neurological:      Comments: unresponsive              Diagnostic Studies      EKG:   Imaging:  I have personally reviewed pertinent reports.    and I have personally reviewed pertinent films in PACS     Medications:  Scheduled PRN      glycopyrrolate, 0.1 mg, Q4H PRN  haloperidol lactate, 0.5 mg, Q2H PRN  HYDROmorphone, 0.3 mg, Q2H PRN       Continuous Labs:    CBC    Recent Labs     07/28/23  0326   WBC 10.29*   HGB 15.2   HCT 44.7        BMP    Recent Labs     07/28/23  0326   SODIUM 121*   K 4.3   CL 89*   CO2 18*   AGAP 14   BUN 28*   CREATININE 1.29   CALCIUM 8.7       Coags    Recent Labs     07/28/23  0326   INR 1.90*   PTT 36        Additional Electrolytes  Recent Labs     07/28/23  0326   MG 1.7*          Blood Gas    Recent Labs     07/28/23  0849   PHART 7.439   [id number] 28.0*   PO2ART 150.8*   DVS2QLT 18.5*   BEART -3.9   SOURCE Radial, Left     Recent Labs     07/28/23  0849   SOURCE Radial, Left    LFTs  Recent Labs     07/28/23  0326   ALT 28   AST 27   ALKPHOS 152*   ALB 3.2*   TBILI 4.61*       Infectious  Recent Labs     07/28/23  0326   PROCALCITONI 0.28*     Glucose  Recent Labs     07/28/23  0326   GLUC 148*                   VLAD Roa

## 2023-07-29 NOTE — ASSESSMENT & PLAN NOTE
Likely Cardiogenic  Transferred to ICU for further work up and treatment  After extensive discussion with family, they do not desire any invasive monitoring although they are OK with high-dose diuretic challenge which unfortunately was unsuccessful  See further details under heart failure a/p    · Distal extremities cool to touch, unknown if acute on chronic however has venous stasis changes suggestive of chronicity. Currently LFTs/BP/renal function stable. Check lactic. Monitor for development of cardiogenic shock. · POCUS EF 20%, formal echo ordered    Transitioned to comfort care per [address] [place] discussion. No further workup.

## 2023-07-29 NOTE — ASSESSMENT & PLAN NOTE
Wt Readings from Last 3 Encounters:   07/28/23 135 kg (296 lb 15.4 oz)   04/28/23 120 kg (265 lb 3.4 oz)   04/25/23 120 kg (265 lb 3.4 oz)     · Echo 4/24/23 EF 20%. Severe AS (AV peak velocity 2.1 m/s. AV mean gradient 9 mmHg. DVI 0.19). Moderate TR  • She follows with cardiology at [facility] for CAD, AS, cardiomyopathy, and CHF. She was hospitalized in 2018 with CHF. She has had cardiac cath twice in the past. She had MI in her 25s. • GDMT: 20mg lasix, losartan,metoprolol. Single lead ICD in place. • Cardiology consulted this admission. • Clinically appears volume overloaded. Ordered 80mg lasix IV tid by cardiology  • Monitor UO (reported oliguric), daily weights  • Concern to cardiogenic shock (see plan under shock)  • Trial high dose lasix and diuril - no response    Further workup and treatment discontinued - patient transitioned to comfort care.

## 2023-07-29 NOTE — ASSESSMENT & PLAN NOTE
Wt Readings from Last 3 Encounters:   07/28/23 135 kg (296 lb 15.4 oz)   04/28/23 120 kg (265 lb 3.4 oz)   04/25/23 120 kg (265 lb 3.4 oz)     · Echo 4/24/23 EF 20%. Severe AS (AV peak velocity 2.1 m/s. AV mean gradient 9 mmHg. DVI 0.19). Moderate TR  • She follows with cardiology at [facility] for CAD, AS, cardiomyopathy, and CHF. She was hospitalized in 2018 with CHF. She has had cardiac cath twice in the past. She had MI in her 25s. • GDMT: 20mg lasix, losartan,metoprolol. Single lead ICD in place. • Cardiology consulted this admission. • Clinically appearred volume overloaded on presentation. • High dose diuretic challenge ordered - failed  • Concern to cardiogenic shock (see plan under shock)  • GOC discussion held with family and patient transitioned to comfort care 7/28.

## 2023-07-29 NOTE — ASSESSMENT & PLAN NOTE
· H/o same. · Ongoing workup in process with outpatient GI/hepatology  · MRI: 1. Mild hepatic steatosis. Cirrhotic morphology was better visualized on ultrasound. Echogenic lesion seen on ultrasound is not definitely visualized given limitations of the study. No definite suspicious mass is visualized and this may have represented focal fat. 3.  Mildly increased abdominopelvic ascites and diffuse subcutaneous edema. · AST 27/ALT 29    Transitioned to comfort care.

## 2023-07-30 NOTE — PROGRESS NOTES
[address] [facility]  Progress Note  Name: aMrleny Georges  MRN: [id number]  Unit/Bed#: -29 I Date of Admission: 2023   Date of Service: 2023 I Hospital Day: 2    Assessment/Plan   * Acute encephalopathy  Assessment & Plan  · Unclear etiology. · Head CT: (-) acute  · AB.43/28/150/18.5  · F/u ammonia level: Pending  · UA/culture: Pending  · Sodium: 121   · BCx2: pending  · Covid PCR negative  · TSH: pending  · T/c 2/2 hypoperfusion from decompensated heart failure  · Neuro checks, CAM-ICU. · Transfer to [facility] under CC service for ongoing workup/management on   · GOC discussed and despite patient's encephalopathy, she was able to express her wishes to transition to comfort care and daughter agreed. Work up stopped. Metabolic acidosis  Assessment & Plan  · AB.43//150/18.5  · Trend BMP q6h    Labs discontinued. Transitioned to comfort care. Acute kidney injury ([address])  Assessment & Plan  · Suspected 2/2 ATN from low flow state. Reportedly oliguric  · Trend Renal function, UO    Transitioned to comfort care. Acute decompensated heart failure [facility])  Assessment & Plan  Wt Readings from Last 3 Encounters:   23 135 kg (296 lb 15.4 oz)   23 120 kg (265 lb 3.4 oz)   23 120 kg (265 lb 3.4 oz)     · Echo 23 EF 20%. Severe AS (AV peak velocity 2.1 m/s. AV mean gradient 9 mmHg. DVI 0.19). Moderate TR  • She follows with cardiology at [facility] for CAD, AS, cardiomyopathy, and CHF. She was hospitalized in 2018 with CHF. She has had cardiac cath twice in the past. She had MI in her 25s. • GDMT: 20mg lasix, losartan,metoprolol. Single lead ICD in place. • Cardiology consulted this admission. • Clinically appearred volume overloaded on presentation. • High dose diuretic challenge ordered - failed  • Concern to cardiogenic shock (see plan under shock)  • GOC discussion held with family and patient transitioned to comfort care .     Acute hyponatremia  Assessment & Plan  · Na 121. Suspected hypervolemic hyponatremia in setting of decompensated CHF  · TSH/cortisol/uric acid/urine osmol/urine sodium/serum osmol pending  · S/p 40mg lasix IV x1. High dose diuretics ordered - not responsive  · Spirinolactone on hold  · Do not have strong suspicion encephalopathy 2/2 hyponatremia however considered. If rest of work up negative, will consider hypertonic. Further treatment and labs discontinued; transitioned to comfort care. Shock [facility])  Assessment & Plan  Likely Cardiogenic  Transferred to ICU for further work up and treatment  After extensive discussion with family, they do not desire any invasive monitoring although they are OK with high-dose diuretic challenge which unfortunately was unsuccessful  See further details under heart failure a/p    · Distal extremities cool to touch, unknown if acute on chronic however has venous stasis changes suggestive of chronicity. Currently LFTs/BP/renal function stable. Check lactic. Monitor for development of cardiogenic shock. · POCUS EF 20%, formal echo ordered    Transitioned to comfort care per [address] [place] discussion. No further workup. Decreased pedal pulses  Assessment & Plan  · Personally evaluated. Unable to obtain dopplerable dorsalis pedus b/l. + posterior tibialis. · Distal extremities cool, mottled and discolored w/ presence of chronic venous stasis changes  · Routine arterial duplex study ordered by admitting team. Order changed to stat and I called the vascular tech to perform BRYNN ASAP    Transitioned to comfort care. Hyperbilirubinemia  Assessment & Plan  · H/o same. · Ongoing workup in process with outpatient GI/hepatology  · MRI: 1. Mild hepatic steatosis. Cirrhotic morphology was better visualized on ultrasound. Echogenic lesion seen on ultrasound is not definitely visualized given limitations of the study.  No definite suspicious mass is visualized and this may have represented focal fat. 3.  Mildly increased abdominopelvic ascites and diffuse subcutaneous edema. · AST 27/ALT 29    Transitioned to comfort care. Ambulatory dysfunction  Assessment & Plan  · PT/OT when stable    Discontinue consult. Transitioned to comfort care. Low serum magnesium level  Assessment & Plan  · Replete    Labs discontinued. Transitioned to comfort care. Atrial fibrillation ([address])  Assessment & Plan  · Currently in NSR. Rate 78  · Managed on coumadin outpatient. · Hold and switch to heparin infusion for potential central line placement 2/2 limited access/multiple lab draws/ScVo2 check  · INR 1.9.   · Ordered metoprolol w/ hold parameters    Cardiopulmonary monitoring discontinued. Labs and meds discontinued. Transitioned to comfort care. Type 2 diabetes mellitus, without long-term current use of insulin [facility])  Assessment & Plan  Lab Results   Component Value Date    HGBA1C 6.8 (H) 04/18/2023       Recent Labs     07/28/23  0827   POCGLU 135       Blood Sugar Average: Last 72 hrs:  (P) 135     · Home med: metformin. · Hold. Transition to [address] [place] [address]    Transitioned to comfort care. Disease of thyroid gland  Assessment & Plan  · Check TSH  · Continue synthroid    Transitioned to comfort care. ICU Core Measures     A: Assess, Prevent, and Manage Pain · Has pain been assessed? Yes  · Need for changes to pain regimen? NA   B: Both SAT/SAT  · N/A   C: Choice of Sedation · RASS Goal: N/A patient not on sedation  · Need for changes to sedation or analgesia regimen? No   D: Delirium · CAM-ICU: Unable to perform secondary to Acute cognitive dysfunction   E: Early Mobility  · Plan for early mobility? NA   F: Family Engagement · Plan for family engagement today? Yes       Review of Invasive Devices: [place] Plan: end of life care        Prophylaxis:  VTE    Stress Ulcer  not ordered       Subjective   HPI/24hr events: Patient on comfort care. Family at bedside. No acute concerns.     Unable to perform ROS 2/2 comfort care/AMS        Objective                            Vitals I/O      Most Recent Min/Max in 24hrs   Temp (!) 95.9 °F (35.5 °C) Temp  Min: 95.9 °F (35.5 °C)  Max: 95.9 °F (35.5 °C)   Pulse 73 Pulse  Min: 68  Max: 73   Resp (!) 26 No data recorded   BP 91/59 No data recorded   O2 Sat 90 % SpO2  Min: 83 %  Max: 92 %      Intake/Output Summary (Last 24 hours) at 7/30/2023 0255  Last data filed at 7/29/2023 1801  Gross per 24 hour   Intake --   Output 10 ml   Net -10 ml         Diet Regular; Pleasure Feed     Invasive Monitoring Physical exam    Physical Exam  Constitutional:       Appearance: She is ill-appearing and toxic-appearing. HENT:      Head: Normocephalic. Mouth/Throat:      Mouth: Mucous membranes are dry. Cardiovascular:      Comments: Non-tele, diminished pulses  Pulmonary:      Effort: No respiratory distress. Abdominal:      General: There is no distension. Palpations: Abdomen is soft. Musculoskeletal:      Right lower leg: Edema present. Left lower leg: Edema present. Skin:     Capillary Refill: Capillary refill takes 2 to 3 seconds. Comments: Pale, cool, cyanotic extremities   Neurological:      Mental Status: She is unresponsive. GCS: GCS eye subscore is 1. GCS verbal subscore is 1. GCS motor subscore is 4. Comments: Unresponsive, occasionally with randomly move hands non-purposefully            Diagnostic Studies      EKG: n/a  Imaging: I have personally reviewed pertinent reports.        Medications:  Scheduled PRN      glycopyrrolate, 0.1 mg, Q4H PRN  haloperidol lactate, 0.5 mg, Q2H PRN  HYDROmorphone, 0.3 mg, Q2H PRN       Continuous          Labs:    CBC    Recent Labs     07/28/23  0326   WBC 10.29*   HGB 15.2   HCT 44.7        BMP    Recent Labs     07/28/23  0326   SODIUM 121*   K 4.3   CL 89*   CO2 18*   AGAP 14   BUN 28*   CREATININE 1.29   CALCIUM 8.7       Coags    Recent Labs     07/28/23  0326   INR 1.90*   PTT 36        Additional Electrolytes  Recent Labs     07/28/23  0326   MG 1.7*          Blood Gas    Recent Labs     07/28/23  0849   PHART 7.439   PFT2OVA 28.0*   PO2ART 150.8*   DBL3YSI 18.5*   BEART -3.9   SOURCE Radial, Left     Recent Labs     07/28/23  0849   SOURCE Radial, Left    LFTs  Recent Labs     07/28/23  0326   ALT 28   AST 27   ALKPHOS 152*   ALB 3.2*   TBILI 4.61*       Infectious  Recent Labs     07/28/23  0326   PROCALCITONI 0.28*     Glucose  Recent Labs     07/28/23  0326   GLUC 148*                     VLAD Roa

## 2023-07-30 NOTE — PLAN OF CARE
Problem: Nutrition/Hydration-ADULT  Goal: Nutrient/Hydration intake appropriate for improving, restoring or maintaining nutritional needs  Description: Monitor and assess patient's nutrition/hydration status for malnutrition. Collaborate with interdisciplinary team and initiate plan and interventions as ordered. Monitor patient's weight and dietary intake as ordered or per policy. Utilize nutrition screening tool and intervene as necessary. Determine patient's food preferences and provide high-protein, high-caloric foods as appropriate.      INTERVENTIONS:  - Monitor oral intake, urinary output, labs, and treatment plans  - Assess nutrition and hydration status and recommend course of action  - Evaluate amount of meals eaten  - Assist patient with eating if necessary   - Allow adequate time for meals  - Recommend/ encourage appropriate diets, oral nutritional supplements, and vitamin/mineral supplements  - Order, calculate, and assess calorie counts as needed  - Recommend, monitor, and adjust tube feedings and TPN/PPN based on assessed needs  - Assess need for intravenous fluids  - Provide specific nutrition/hydration education as appropriate  - Include patient/family/caregiver in decisions related to nutrition  Outcome: Progressing     Problem: MOBILITY - ADULT  Goal: Maintain or return to baseline ADL function  Description: INTERVENTIONS:  -  Assess patient's ability to carry out ADLs; assess patient's baseline for ADL function and identify physical deficits which impact ability to perform ADLs (bathing, care of mouth/teeth, toileting, grooming, dressing, etc.)  - Assess/evaluate cause of self-care deficits   - Assess range of motion  - Assess patient's mobility; develop plan if impaired  - Assess patient's need for assistive devices and provide as appropriate  - Encourage maximum independence but intervene and supervise when necessary  - Involve family in performance of ADLs  - Assess for home care needs following discharge   - Consider OT consult to assist with ADL evaluation and planning for discharge  - Provide patient education as appropriate  Outcome: Progressing  Goal: Maintains/Returns to pre admission functional level  Description: INTERVENTIONS:  - Perform BMAT or MOVE assessment daily.   - Set and communicate daily mobility goal to care team and patient/family/caregiver.    - Collaborate with rehabilitation services on mobility goals if consulted  - Out of bed for toileting  - Record patient progress and toleration of activity level   Outcome: Progressing     Problem: Prexisting or High Potential for Compromised Skin Integrity  Goal: Skin integrity is maintained or improved  Description: INTERVENTIONS:  - Identify patients at risk for skin breakdown  - Assess and monitor skin integrity  - Assess and monitor nutrition and hydration status  - Monitor labs   - Assess for incontinence   - Turn and reposition patient  - Assist with mobility/ambulation  - Relieve pressure over bony prominences  - Avoid friction and shearing  - Provide appropriate hygiene as needed including keeping skin clean and dry  - Evaluate need for skin moisturizer/barrier cream  - Collaborate with interdisciplinary team   - Patient/family teaching  - Consider wound care consult   Outcome: Progressing

## 2023-07-31 PROBLEM — E83.42 LOW SERUM MAGNESIUM LEVEL: Status: RESOLVED | Noted: 2023-01-01 | Resolved: 2023-01-01

## 2023-07-31 PROBLEM — Z71.89 GOALS OF CARE, COUNSELING/DISCUSSION: Status: ACTIVE | Noted: 2023-01-01

## 2023-07-31 PROBLEM — E87.20 METABOLIC ACIDOSIS: Status: RESOLVED | Noted: 2023-01-01 | Resolved: 2023-01-01

## 2023-07-31 NOTE — NURSING NOTE
Patient and family well known to this nurse. Terence Weller at bedside and provided update on patient care and hospital education completed. All questions answered. Patient appears comfortable at present, will continue to monitor. Terence Weller to remain at bedside.

## 2023-07-31 NOTE — ACP (ADVANCE CARE PLANNING)
Advanced Care Planning Progress Note    Serious Illness Conversation    1. What is your understanding now of where you are with your illness? 2. How much information about what is likely to be ahead with your illness would you like to have? 3. What did you (clinician) communicate to the patient? Prognostic Communication: Uncertain - It can be difficult to predict what will happen with your illness. I hope you will continue to live well for a long time but I’m worried that you could get sick quickly, and I think it is important to prepare for that possibility. 4. If your health situation worsens, what are your most important goals? Goals: be physically comfortable     5. What are the biggest fears and worries about the future and your health? 6. What abilities are so critical to your life that you cannot imagine living without them? 7. What gives you strength as you think about the future with your illness? 8. If you become sicker, how much are you willing to go through for the possibility of gaining more time? Have a feeding tube: No   Be in the ICU: No    Be on a ventilator: No Be uncomfortable: No   Be on dialysis: No Undergo aggressive test and/or procedures: No   9. How much does your proxy and family know about your priorities and wishes? Discussion Discussion: extensive discussion with family about goals and wishes     James Suarez heard you say that per family having mom comfortable towards end-of-life is really important to you. Keeping that in mind, and what we know about your illness, I recommend that we continue comfort care/hospice care in the hospital. This will help us make sure that your treatment plans reflect what’s important to you. How does this plan sound to you? I will do everything I can to help you through this.   Patient family verbalized understanding of the plan     I have spent 35 minutes speaking with my patient on advanced care planning today or during this visit     Advanced directives         Enrike Hannon MD

## 2023-07-31 NOTE — PLAN OF CARE
Problem: Nutrition/Hydration-ADULT  Goal: Nutrient/Hydration intake appropriate for improving, restoring or maintaining nutritional needs  Description: Monitor and assess patient's nutrition/hydration status for malnutrition. Collaborate with interdisciplinary team and initiate plan and interventions as ordered. Monitor patient's weight and dietary intake as ordered or per policy. Utilize nutrition screening tool and intervene as necessary. Determine patient's food preferences and provide high-protein, high-caloric foods as appropriate.      INTERVENTIONS:  - Monitor oral intake, urinary output, labs, and treatment plans  - Assess nutrition and hydration status and recommend course of action  - Evaluate amount of meals eaten  - Assist patient with eating if necessary   - Allow adequate time for meals  - Recommend/ encourage appropriate diets, oral nutritional supplements, and vitamin/mineral supplements  - Order, calculate, and assess calorie counts as needed  - Recommend, monitor, and adjust tube feedings and TPN/PPN based on assessed needs  - Assess need for intravenous fluids  - Provide specific nutrition/hydration education as appropriate  - Include patient/family/caregiver in decisions related to nutrition  Outcome: Progressing     Problem: MOBILITY - ADULT  Goal: Maintain or return to baseline ADL function  Description: INTERVENTIONS:  -  Assess patient's ability to carry out ADLs; assess patient's baseline for ADL function and identify physical deficits which impact ability to perform ADLs (bathing, care of mouth/teeth, toileting, grooming, dressing, etc.)  - Assess/evaluate cause of self-care deficits   - Assess range of motion  - Assess patient's mobility; develop plan if impaired  - Assess patient's need for assistive devices and provide as appropriate  - Encourage maximum independence but intervene and supervise when necessary  - Involve family in performance of ADLs  - Assess for home care needs following discharge   - Consider OT consult to assist with ADL evaluation and planning for discharge  - Provide patient education as appropriate  Outcome: Progressing  Goal: Maintains/Returns to pre admission functional level  Description: INTERVENTIONS:  - Perform BMAT or MOVE assessment daily.   - Set and communicate daily mobility goal to care team and patient/family/caregiver.    - Collaborate with rehabilitation services on mobility goals if consulted  - Record patient progress and toleration of activity level   Outcome: Progressing     Problem: Prexisting or High Potential for Compromised Skin Integrity  Goal: Skin integrity is maintained or improved  Description: INTERVENTIONS:  - Identify patients at risk for skin breakdown  - Assess and monitor skin integrity  - Assess and monitor nutrition and hydration status  - Monitor labs   - Assess for incontinence   - Turn and reposition patient  - Assist with mobility/ambulation  - Relieve pressure over bony prominences  - Avoid friction and shearing  - Provide appropriate hygiene as needed including keeping skin clean and dry  - Evaluate need for skin moisturizer/barrier cream  - Collaborate with interdisciplinary team   - Patient/family teaching  - Consider wound care consult   Outcome: Progressing     Problem: PAIN - ADULT  Goal: Verbalizes/displays adequate comfort level or baseline comfort level  Description: Interventions:  - Encourage patient to monitor pain and request assistance  - Assess pain using appropriate pain scale0-10  - Administer analgesics based on type and severity of pain and evaluate response  - Implement non-pharmacological measures as appropriate and evaluate response  - Consider cultural and social influences on pain and pain management  - Notify physician/advanced practitioner if interventions unsuccessful or patient reports new pain  Outcome: Progressing

## 2023-07-31 NOTE — ASSESSMENT & PLAN NOTE
Wt Readings from Last 3 Encounters:   07/28/23 135 kg (296 lb 15.4 oz)   04/28/23 120 kg (265 lb 3.4 oz)   04/25/23 120 kg (265 lb 3.4 oz)     Echo 4/24/23 EF 20%. Severe AS (AV peak velocity 2.1 m/s. AV mean gradient 9 mmHg. DVI 0.19). Moderate TR  She follows with cardiology at [facility] ([facility]) for CAD, AS, cardiomyopathy, and CHF. She was hospitalized in 2018 with CHF. She has had cardiac cath twice in the past. She had MI in her 25s. GDMT: 20mg lasix, losartan,metoprolol. Single lead ICD in place. Cardiology consulted this admission. Clinically appearred volume overloaded on presentation.    High dose diuretic challenge ordered - failed  Concern to cardiogenic shock (see plan under shock)    · Comfort measures only

## 2023-07-31 NOTE — NUTRITION
Patient condition continues to decline. Medications given IV as needed as ordered for same throughout shift. Continues to be anuric, christensen flushed for patency. Patient suctioned per family request for scant amount thick yellow mucus. Suction available at bedside. Patient with no PO intake. Patient needs continue to be met by staff. Family at bedside.

## 2023-07-31 NOTE — ASSESSMENT & PLAN NOTE
In setting of severe cardiogenic shock and malperfusion  CTH NAICA  Hyponatremia in setting of volume overload    · Comfort measures only initiated 07/28.   She has deteriorated significantly overnight and we will continue her inpatient on comfort measures.

## 2023-07-31 NOTE — ASSESSMENT & PLAN NOTE
Subacute  Ongoing workup in process with outpatient GI/hepatology  MRI: 1.  Mild hepatic steatosis. Cirrhotic morphology was better visualized on ultrasound.  Echogenic lesion seen on ultrasound is not definitely visualized given limitations of the study. No definite suspicious mass is visualized and this may have represented focal fat. 3. Floreen Boot increased abdominopelvic ascites and diffuse subcutaneous edema.     · Comfort measures only

## 2023-07-31 NOTE — ASSESSMENT & PLAN NOTE
Personally evaluated. Unable to obtain dopplerable dorsalis pedus b/l. + posterior tibialis.    Distal extremities cool, mottled and discolored w/ presence of chronic venous stasis changes  BLE duplex w/ diffuse PAD    · Comfort measures only

## 2023-07-31 NOTE — PLAN OF CARE
Problem: Nutrition/Hydration-ADULT  Goal: Nutrient/Hydration intake appropriate for improving, restoring or maintaining nutritional needs  Description: Monitor and assess patient's nutrition/hydration status for malnutrition. Collaborate with interdisciplinary team and initiate plan and interventions as ordered. Monitor patient's weight and dietary intake as ordered or per policy. Utilize nutrition screening tool and intervene as necessary. Determine patient's food preferences and provide high-protein, high-caloric foods as appropriate.      INTERVENTIONS:  - Monitor oral intake, urinary output, labs, and treatment plans  - Assess nutrition and hydration status and recommend course of action  - Evaluate amount of meals eaten  - Assist patient with eating if necessary   - Allow adequate time for meals  - Recommend/ encourage appropriate diets, oral nutritional supplements, and vitamin/mineral supplements  - Order, calculate, and assess calorie counts as needed  - Recommend, monitor, and adjust tube feedings and TPN/PPN based on assessed needs  - Assess need for intravenous fluids  - Provide specific nutrition/hydration education as appropriate  - Include patient/family/caregiver in decisions related to nutrition  Outcome: Progressing     Problem: MOBILITY - ADULT  Goal: Maintain or return to baseline ADL function  Description: INTERVENTIONS:  -  Assess patient's ability to carry out ADLs; assess patient's baseline for ADL function and identify physical deficits which impact ability to perform ADLs (bathing, care of mouth/teeth, toileting, grooming, dressing, etc.)  - Assess/evaluate cause of self-care deficits   - Assess range of motion  - Assess patient's mobility; develop plan if impaired  - Assess patient's need for assistive devices and provide as appropriate  - Encourage maximum independence but intervene and supervise when necessary  - Involve family in performance of ADLs  - Assess for home care needs following discharge   - Consider OT consult to assist with ADL evaluation and planning for discharge  - Provide patient education as appropriate  Outcome: Progressing  Goal: Maintains/Returns to pre admission functional level  Description: INTERVENTIONS:  - Perform BMAT or MOVE assessment daily.   - Set and communicate daily mobility goal to care team and patient/family/caregiver. - Collaborate with rehabilitation services on mobility goals if consulted    - Reposition patient every 2 hours.   - Outcome: Progressing     Problem: Prexisting or High Potential for Compromised Skin Integrity  Goal: Skin integrity is maintained or improved  Description: INTERVENTIONS:  - Identify patients at risk for skin breakdown  - Assess and monitor skin integrity  - Assess and monitor nutrition and hydration status  - Monitor labs   - Assess for incontinence   - Turn and reposition patient  - Assist with mobility/ambulation  - Relieve pressure over bony prominences  - Avoid friction and shearing  - Provide appropriate hygiene as needed including keeping skin clean and dry  - Evaluate need for skin moisturizer/barrier cream  - Collaborate with interdisciplinary team   - Patient/family teaching  - Consider wound care consult   Outcome: Progressing     Problem: PAIN - ADULT  Goal: Verbalizes/displays adequate comfort level or baseline comfort level  Description: Interventions:  - Encourage patient to monitor pain and request assistance  - Assess pain using appropriate pain scale0-10  - Administer analgesics based on type and severity of pain and evaluate response  - Implement non-pharmacological measures as appropriate and evaluate response  - Consider cultural and social influences on pain and pain management  - Notify physician/advanced practitioner if interventions unsuccessful or patient reports new pain  Outcome: Progressing

## 2023-07-31 NOTE — PROGRESS NOTES
[address]  Progress Note  Name: Quita Rizo  MRN: [id number]  Unit/Bed#: -25 I Date of Admission: 7/28/2023   Date of Service: 7/31/2023 I Hospital Day: 3    Assessment/Plan   * Acute encephalopathy  Assessment & Plan  In setting of severe cardiogenic shock and malperfusion  [facility]H NAICA  Hyponatremia in setting of volume overload    · Comfort measures only initiated 07/28. She has deteriorated significantly overnight and we will continue her inpatient on comfort measures.       Goals of care, counseling/discussion  Assessment & Plan  Patient transitioned to comfort care 07/28  Patient's appears comfortable on exam    · PRN dilaudid, haldol, and robinul  · CM following as patient will require hospice placement  · If patient improves, unable to be discharged on home hospice as [facility] investigation at this time    [facility])  Assessment & Plan  Cardiogenic shock  After extensive discussion with family, they do not desire any invasive monitoring although they are OK with high-dose diuretic challenge which unfortunately was unsuccessful  See further details under heart failure a/p  POCUS EF 20%, formal echo prior to transition of comfort     · Comfort measures only    Decreased pedal pulses  Assessment & Plan  Personally evaluated. Unable to obtain dopplerable dorsalis pedus b/l. + posterior tibialis. Distal extremities cool, mottled and discolored w/ presence of chronic venous stasis changes  BLE duplex w/ diffuse PAD    · Comfort measures only       Acute kidney injury ([address])  Assessment & Plan  Likely 2/2 ATN vs cardiorenal syndrome    · Comfort measures only    Hyperbilirubinemia  Assessment & Plan  Subacute  Ongoing workup in process with outpatient GI/hepatology  MRI: 1.  Mild hepatic steatosis. Cirrhotic morphology was better visualized on ultrasound.  Echogenic lesion seen on ultrasound is not definitely visualized given limitations of the study.  No definite suspicious mass is visualized and this may have represented focal fat. 3. Tani Skill increased abdominopelvic ascites and diffuse subcutaneous edema. · Comfort measures only    Ambulatory dysfunction  Assessment & Plan  In setting of progressive medical decline    · Comfort measures only  · Bed rest    Acute decompensated heart failure [facility])  Assessment & Plan  Wt Readings from Last 3 Encounters:   07/28/23 135 kg (296 lb 15.4 oz)   04/28/23 120 kg (265 lb 3.4 oz)   04/25/23 120 kg (265 lb 3.4 oz)     Echo 4/24/23 EF 20%. Severe AS (AV peak velocity 2.1 m/s. AV mean gradient 9 mmHg. DVI 0.19). Moderate TR  She follows with cardiology at [facility] for CAD, AS, cardiomyopathy, and CHF. She was hospitalized in 2018 with CHF. She has had cardiac cath twice in the past. She had MI in her 25s. GDMT: 20mg lasix, losartan,metoprolol. Single lead ICD in place. Cardiology consulted this admission. Clinically appearred volume overloaded on presentation.    High dose diuretic challenge ordered - failed  Concern to cardiogenic shock (see plan under shock)    · Comfort measures only    Acute hyponatremia  Assessment & Plan  Likely hypervolemic hyponatremia in setting of volume overload    · Comfort measures only    Atrial fibrillation (HCC)  Assessment & Plan       · Cardiopulmonary monitoring discontinued, comfort measures only       Type 2 diabetes mellitus, without long-term current use of insulin [facility])  Assessment & Plan  Lab Results   Component Value Date    HGBA1C 6.8 (H) 04/18/2023       Recent Labs     07/28/23  0827 07/29/23  2255   POCGLU 135 140       Blood Sugar Average: Last 72 hrs:  · (P) 137.5    · Comfort measures only    Disease of thyroid gland  Assessment & Plan  · Comfort measures only         VTE Pharmacologic Prophylaxis:   Pharmacologic: Pharmacologic VTE Prophylaxis contraindicated due to hospice  Mechanical VTE Prophylaxis in Place: Yes    Patient Centered Rounds: I have performed bedside rounds with nursing staff today. Discussions with Specialists or Other Care Team Provider: case management    Education and Discussions with Family / Patient: discussed with daughter at bedside    Time Spent for Care: 30 minutes. More than 50% of total time spent on counseling and coordination of care as described above. Current Length of Stay: 3 day(s)    Current Patient Status: Inpatient   Certification Statement: The patient will continue to require additional inpatient hospital stay due to comfort care/hospice care    Discharge Plan: Anticipate patient might  today or tomorrow    Code Status: Level 4 - Comfort Care      Subjective:   Is not responding to any verbal physical stimuli. Gurgling noted    Objective:     Vitals:   No data recorded. Body mass index is 50.97 kg/m². Input and Output Summary (last 24 hours): Intake/Output Summary (Last 24 hours) at 2023 1213  Last data filed at 2023 1301  Gross per 24 hour   Intake --   Output 0 ml   Net 0 ml       Physical Exam:     Physical Exam  Constitutional:       Appearance: She is ill-appearing and toxic-appearing. HENT:      Head: Normocephalic and atraumatic. Nose: Nose normal.      Mouth/Throat:      Mouth: Mucous membranes are moist.      Comments: Frothy secretions noted in the mouth  Cardiovascular:      Rate and Rhythm: Regular rhythm. Tachycardia present. Pulmonary:      Effort: Respiratory distress present. Breath sounds: Rhonchi and rales present. Abdominal:      General: There is no distension. Palpations: Abdomen is soft. Tenderness: There is no abdominal tenderness. Musculoskeletal:      Right lower leg: Edema present. Left lower leg: Edema present. Neurological:      Comments: Does not respond to any physical or verbal stimuli. Unable to arouse.   Gurgling noted           Additional Data:     Labs:    Results from last 7 days   Lab Units 23  0326   WBC Thousand/uL 10.29* HEMOGLOBIN g/dL 15.2   HEMATOCRIT % 44.7   PLATELETS Thousands/uL 149   NEUTROS PCT % 85*   LYMPHS PCT % 11*   MONOS PCT % 3*   EOS PCT % 0     Results from last 7 days   Lab Units 07/28/23  0326   SODIUM mmol/L 121*   POTASSIUM mmol/L 4.3   CHLORIDE mmol/L 89*   CO2 mmol/L 18*   BUN mg/dL 28*   CREATININE mg/dL 1.29   ANION GAP mmol/L 14   CALCIUM mg/dL 8.7   ALBUMIN g/dL 3.2*   TOTAL BILIRUBIN mg/dL 4.61*   ALK PHOS U/L 152*   ALT U/L 28   AST U/L 27   GLUCOSE RANDOM mg/dL 148*     Results from last 7 days   Lab Units 07/28/23  0326   INR  1.90*     Results from last 7 days   Lab Units 07/29/23  2255 07/28/23  0827   POC GLUCOSE mg/dl 140 135         Results from last 7 days   Lab Units 07/28/23  0902 07/28/23  0326   LACTIC ACID mmol/L 3.0* 2.8*   PROCALCITONIN ng/ml  --  0.28*           * I Have Reviewed All Lab Data Listed Above. * Additional Pertinent Lab Tests Reviewed: [address] Admission Reviewed    Imaging:    Imaging Reports Reviewed Today Include: CT chest abdomen pelvis  Imaging Personally Reviewed by Myself Includes: cT chest abdomen pelvis    Recent Cultures (last 7 days):     Results from last 7 days   Lab Units 07/28/23  0336 07/28/23  0326   BLOOD CULTURE  No Growth at 72 hrs. No Growth at 72 hrs. Last 24 Hours Medication List:   Current Facility-Administered Medications   Medication Dose Route Frequency Provider Last Rate   • glycopyrrolate  0.1 mg Intravenous Q4H PRN VLAD Roa     • haloperidol lactate  0.5 mg Intravenous Q2H PRN VLAD Roa     • HYDROmorphone  0.5 mg Intravenous Q1H PRN VLAD Calvo     • scopolamine  1 patch Transdermal Q72H Na Styles MD          Today, Patient Was Seen By: Na Styles MD    ** Please Note: Dictation voice to text software may have been used in the creation of this document.  **

## 2023-07-31 NOTE — ASSESSMENT & PLAN NOTE
Lab Results   Component Value Date    HGBA1C 6.8 (H) 04/18/2023       Recent Labs     07/28/23  0827 07/29/23  5686   POCGLU 135 140       Blood Sugar Average: Last 72 hrs:  · (P) 137.5    · Comfort measures only

## 2023-07-31 NOTE — ASSESSMENT & PLAN NOTE
Patient transitioned to comfort care 07/28  Patient's appears comfortable on exam    · PRN dilaudid, haldol, and robinul  · CM following as patient will require hospice placement  · If patient improves, unable to be discharged on home hospice as open [facility] investigation at this time

## 2023-07-31 NOTE — ASSESSMENT & PLAN NOTE
Cardiogenic shock  After extensive discussion with family, they do not desire any invasive monitoring although they are OK with high-dose diuretic challenge which unfortunately was unsuccessful  See further details under heart failure a/p  POCUS EF 20%, formal echo prior to transition of comfort     · Comfort measures only

## 2023-08-01 NOTE — ASSESSMENT & PLAN NOTE
Wt Readings from Last 3 Encounters:   07/28/23 135 kg (296 lb 15.4 oz)   04/28/23 120 kg (265 lb 3.4 oz)   04/25/23 120 kg (265 lb 3.4 oz)     Echo 4/24/23 EF 20%. Severe AS (AV peak velocity 2.1 m/s. AV mean gradient 9 mmHg. DVI 0.19). Moderate TR  She follows with cardiology at [facility] for CAD, AS, cardiomyopathy, and CHF. She was hospitalized in 2018 with CHF. She has had cardiac cath twice in the past. She had MI in her 25s. GDMT: 20mg lasix, losartan,metoprolol. Single lead ICD in place. Cardiology consulted this admission. Clinically appearred volume overloaded on presentation.    High dose diuretic challenge ordered - failed  Concern to cardiogenic shock (see plan under shock)    · Comfort measures only

## 2023-08-01 NOTE — PLAN OF CARE
Problem: Prexisting or High Potential for Compromised Skin Integrity  Goal: Skin integrity is maintained or improved  Description: INTERVENTIONS:  - Identify patients at risk for skin breakdown  - Assess and monitor skin integrity  - Assess and monitor nutrition and hydration status  - Monitor labs   - Assess for incontinence   - Turn and reposition patient  - Assist with mobility/ambulation  - Relieve pressure over bony prominences  - Avoid friction and shearing  - Provide appropriate hygiene as needed including keeping skin clean and dry  - Evaluate need for skin moisturizer/barrier cream  - Collaborate with interdisciplinary team   - Patient/family teaching  - Consider wound care consult   8/1/2023 0918 by Wilman Coelho RN  Outcome: Progressing  8/1/2023 0917 by Wilman Coelho RN  Outcome: Not Progressing     Problem: PAIN - ADULT  Goal: Verbalizes/displays adequate comfort level or baseline comfort level  Description: Interventions:  - Encourage patient to monitor pain and request assistance  - Assess pain using appropriate pain scale0-10  - Administer analgesics based on type and severity of pain and evaluate response  - Implement non-pharmacological measures as appropriate and evaluate response  - Consider cultural and social influences on pain and pain management  - Notify physician/advanced practitioner if interventions unsuccessful or patient reports new pain  8/1/2023 0918 by Wilman Coelho RN  Outcome: Progressing  8/1/2023 0917 by Wilman Coelho RN  Outcome: Not Progressing

## 2023-08-01 NOTE — ASSESSMENT & PLAN NOTE
Subacute  Ongoing workup in process with outpatient GI/hepatology  MRI: 1.  Mild hepatic steatosis. Cirrhotic morphology was better visualized on ultrasound.  Echogenic lesion seen on ultrasound is not definitely visualized given limitations of the study. No definite suspicious mass is visualized and this may have represented focal fat. 3. Afshan Simple increased abdominopelvic ascites and diffuse subcutaneous edema.     · Comfort measures only

## 2023-08-01 NOTE — ASSESSMENT & PLAN NOTE
Lab Results   Component Value Date    HGBA1C 6.8 (H) 04/18/2023       Recent Labs     07/29/23  5100   POCGLU 140       Blood Sugar Average: Last 72 hrs:  (P) 140    · Comfort measures only

## 2023-08-01 NOTE — PLAN OF CARE
Problem: Nutrition/Hydration-ADULT  Goal: Nutrient/Hydration intake appropriate for improving, restoring or maintaining nutritional needs  Description: Monitor and assess patient's nutrition/hydration status for malnutrition. Collaborate with interdisciplinary team and initiate plan and interventions as ordered. Monitor patient's weight and dietary intake as ordered or per policy. Utilize nutrition screening tool and intervene as necessary. Determine patient's food preferences and provide high-protein, high-caloric foods as appropriate.      INTERVENTIONS:  - Monitor oral intake, urinary output, labs, and treatment plans  - Assess nutrition and hydration status and recommend course of action  - Evaluate amount of meals eaten  - Assist patient with eating if necessary   - Allow adequate time for meals  - Recommend/ encourage appropriate diets, oral nutritional supplements, and vitamin/mineral supplements  - Order, calculate, and assess calorie counts as needed  - Recommend, monitor, and adjust tube feedings and TPN/PPN based on assessed needs  - Assess need for intravenous fluids  - Provide specific nutrition/hydration education as appropriate  - Include patient/family/caregiver in decisions related to nutrition  Outcome: Progressing     Problem: MOBILITY - ADULT  Goal: Maintain or return to baseline ADL function  Description: INTERVENTIONS:  -  Assess patient's ability to carry out ADLs; assess patient's baseline for ADL function and identify physical deficits which impact ability to perform ADLs (bathing, care of mouth/teeth, toileting, grooming, dressing, etc.)  - Assess/evaluate cause of self-care deficits   - Assess range of motion  - Assess patient's mobility; develop plan if impaired  - Assess patient's need for assistive devices and provide as appropriate  - Encourage maximum independence but intervene and supervise when necessary  - Involve family in performance of ADLs  - Assess for home care needs following discharge   - Consider OT consult to assist with ADL evaluation and planning for discharge  - Provide patient education as appropriate  Outcome: Progressing  Goal: Maintains/Returns to pre admission functional level  Description: INTERVENTIONS:  - Perform BMAT or MOVE assessment daily.   - Set and communicate daily mobility goal to care team and patient/family/caregiver. - Collaborate with rehabilitation services on mobility goals if consulted  - Perform Range of Motion 2 times a day. - Reposition patient every 2 hours.   - Dangle patient 2 times a day  - Stand patient 2 times a day  - Ambulate patient 2 times a day  - Out of bed to chair 2 times a day   - Out of bed for meals 2 times a day  - Out of bed for toileting  - Record patient progress and toleration of activity level   Outcome: Progressing     Problem: Prexisting or High Potential for Compromised Skin Integrity  Goal: Skin integrity is maintained or improved  Description: INTERVENTIONS:  - Identify patients at risk for skin breakdown  - Assess and monitor skin integrity  - Assess and monitor nutrition and hydration status  - Monitor labs   - Assess for incontinence   - Turn and reposition patient  - Assist with mobility/ambulation  - Relieve pressure over bony prominences  - Avoid friction and shearing  - Provide appropriate hygiene as needed including keeping skin clean and dry  - Evaluate need for skin moisturizer/barrier cream  - Collaborate with interdisciplinary team   - Patient/family teaching  - Consider wound care consult   Outcome: Progressing     Problem: PAIN - ADULT  Goal: Verbalizes/displays adequate comfort level or baseline comfort level  Description: Interventions:  - Encourage patient to monitor pain and request assistance  - Assess pain using appropriate pain scale0-10  - Administer analgesics based on type and severity of pain and evaluate response  - Implement non-pharmacological measures as appropriate and evaluate response  - Consider cultural and social influences on pain and pain management  - Notify physician/advanced practitioner if interventions unsuccessful or patient reports new pain  Outcome: Progressing

## 2023-08-01 NOTE — CASE MANAGEMENT
Case Management Discharge Planning Note    Patient name [employer]  Location [address] [facility] [place] 336/-39 MRN [id number]  : 1950 Date 2023       Current Admission Date: 2023  Current Admission Diagnosis:Acute encephalopathy   Patient Active Problem List    Diagnosis Date Noted   • Goals of care, counseling/discussion 2023   • Shock ([address]) 2023   • Acute hepatic encephalopathy ([address]) 2023   • Acute encephalopathy 2023   • Liver failure ([address]) 2023   • Acute hyponatremia 2023   • Acute decompensated heart failure ([address]) 2023   • Ambulatory dysfunction 2023   • Hyperbilirubinemia 2023   • Acute kidney injury ([address]) 2023   • Decreased pedal pulses 2023   • Abnormal CT scan 2023   • Supratherapeutic INR 2023   • Disease of thyroid gland    • Combined systolic and diastolic congestive heart failure ([address])    • Type 2 diabetes mellitus, without long-term current use of insulin (HCC)    • Atrial fibrillation (HCC)    • Anxiety and depression    • Nausea and vomiting    • Elevated LFTs       LOS (days): 4  Geometric Mean LOS (GMLOS) (days): 3.90  Days to GMLOS:-0.5     OBJECTIVE:  Risk of Unplanned Readmission Score: 16.6         Current admission status: Inpatient   Preferred Pharmacy:   [address] [place] [address]  Phone: [phone] Fax: [phone]    Primary Care Provider: Janki Mock MD    Primary Insurance: MEDICARE  Secondary Insurance:     DISCHARGE DETAILS:        CM received [facility] [employer], checking on patients medical status. CM updated Caesar Montoya on patient. [employer] has not started investigation into patients home to date.

## 2023-08-01 NOTE — ASSESSMENT & PLAN NOTE
Patient:   MILE AGUDELO            MRN: [id number]            FIN: [id number]              Age:   85 years     Sex:  MALE     :  33   Associated Diagnoses:   Fracture of right hip   Author:   YAKOV MAYER     Basic Information   Time seen: Date & time 19 10:51:00.   Arrival mode: Ambulance.   History limitation: Clinical condition.   Additional information: Chief Complaint from Nursing Triage Note : Chief Complaint ED  19 10:45 CDT       Chief Complaint ED        Pt here from home, lives with daughter. Per daughter pt had unwitnessed fall 2 days ago from bed, since then c/o right knee pain when he moves his leg. No other complaints pt is A&O to norm per daughter. pt given tylenol 500mg @ 0830.  .  History source: Daughter.     History of Present Illness   85 year old male with PMHx of gout, past tobacco use, recurrent UTI, gastric ulcer, prostate CA, arthritis, Alzheimer's disease, tremors of nervous system, brought to the ED via EMS s/p an unwitnessed mechanical fall one day ago. Patient scooted towards the edge of his bed and had an unwitnessed fall.  The patient's daughter denies hitting her head and did not sustain any LOC at the time of the fall. He is currently complaining of right knee  pain.  Patient's history is limited secondary to dementia. Daughter is historian.  Dr. Jamil Montenegro is the patient's PCP.    .       Review of Systems             Additional review of systems information: Unable to obtain due to: Dementia.     Health Status   Allergies:    Allergic Reactions (All)  No Known Medication Allergies.   Medications: Per nurse's notes.     Past Medical/ Family/ Social History   Medical history:    All Problems  Gout / [id number] / Confirmed  Recurrent urinary tract infection / [id number] / Confirmed  H/O: blood transfusion / [id number] / Confirmed  Risk factors for obstructive sleep apnea / 98490718 / Confirmed  Impaired cognition / [id number] / Confirmed  Gastric  · Comfort measures only ulcer / [id number] / Confirmed  CA - Cancer of prostate / [id number] / Confirmed  Chronic pain / 425728735 / Confirmed  Abnormal gait / 81331376 / Confirmed  Arthritis / 7580891 / Confirmed  Alzheimer's disease / 76787632 / Confirmed  Prostate cancer / [id number] / Confirmed  Tremors of nervous system / [id number] / Confirmed  Dementia / [id number] / Confirmed  Dysrhythmia / 5897467387 / Confirmed.   Surgical history:    Colonoscopy ([id number]) on 06/27/2017 at 83 Years.  Comments:  07/04/2017 15:48 - Radha Manjarrez  at [facility]  Polypectomy ([id number]) on 06/27/2017 at 83 Years.  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  Laser ablation of duodenal lesion (400684655)..   Family history: Include Family History   Hypertension  CHILD  Hypothyroidism  CHILD  CHILD (Yaneth)  MVP (mitral valve prolapse)  CHILD (Yaneth)  BROTHER  .   Social history:    Social & Psychosocial Habits  Alcohol  07/10/2017  Use: Current    Frequency: 1-2 times per week    Use: Current    Frequency: 1-2 times per week  Exercise  05/31/2019  Do You Exercise: Never  Sexual  04/21/2018  What is your current gender identity? (Check all that apply) Identifies as male   Gender on Insurance Male  Substance Abuse  08/17/2016  Use: None, None  Tobacco  08/17/2016  Smoking Tobacco Use: Former smoker    Smoked/Smokeless Tobacco in Last 30 Days: No    Smoking Tobacco Use: Former smoker    Smoked/Smokeless Tobacco in Last 30 Days: No    Smoking Tobacco Use: Former smoker  Cultural/Faith Practices  02/18/2018  Continue Cultural/Faith Practices While in Hospital: No  .     Physical Examination              Vital Signs   ED Vital Sign   05/31/19 10:45 CDT Temperature - VS 37.0 deg_C  Normal    Temperature Source - VS Oral    Heart/Pulse Rate 74  Normal    Pulse Source Monitor    Respiration Rate 16 breaths/min  Normal    SpO2 97 %  Normal     NIBP Systolic 164  HI    NIBP Diastolic 97  HI    NIBP Source Left Arm    NIBP   HI   .   Height and Weight   05/31/19 10:45 CDT CLINICALWEIGHT 68.4 kg    Weight Method Measured    MEDDOSEWT 68.4 kg   .              Oxygen saturation:  97 %.   O2 saturation reviewed and normal on room air.   General:  Alert, no acute distress, pleasantly demented .    Skin:  Warm, dry, intact.    Head:  Normocephalic, atraumatic.    Neck:  Supple, trachea midline, No JVD.    Eye:  Pupils are equal, round and reactive to light, extraocular movements are intact, normal conjunctiva.   Ears, nose, mouth and throat:  Oral mucosa moist.   Cardiovascular:  Regular rate and rhythm, No murmur, Normal peripheral perfusion, No edema.   Respiratory:  Lungs are clear to auscultation, respirations are non-labored, breath sounds are equal.   Gastrointestinal:  Soft, Nontender, Non distended, Normal bowel sounds.   Back:  Nontender.   Musculoskeletal:  Normal ROM, right hip is flexed and internally rotated.   Psychiatric:  Cooperative, appropriate mood & affect.      Medical Decision Making   Documents reviewed:  Emergency department nurses' notes.   Electrocardiogram:  Time 05/31/19 11:52:00, rate 74, EP Interp, Previous EKG available Compared with 04/22/18 08:05:00, sinus rhythm with 1st degree AV block with premature supraventricular complexes. Inferior infarct. Anterolateral infarct. No STEMI.   Results review:  Lab results : LABORATORY   05/31/19 11:57 CDT Sodium 142 mmol/L    Potassium 4.1 mmol/L    Chloride 106 mmol/L    Carbon Dioxide (CO2) 27 mmol/L    Anion Gap 13 mmol/L    BUN 25 mg/dL  HI    Creatinine 1.61 mg/dL  HI    BUN/Creatinine Ratio 16    GFR ESTIMATE  45    GFR ESTIMATE NON  38    Calcium 9.2 mg/dL    Glucose Level 107 mg/dL  HI    GOT/AST 33 unit/L    GPT/ALT 19 unit/L    Alk Phosphatase 79 unit/L    Bilirubin Total 0.5 mg/dL    Protein, Total 7.2 gm/dL    Albumin 3.2 gm/dL  LOW     Globulin 4.0 gm/dL    A/G Ratio 0.8  LOW    WBC 6.1 THOUSAND/mcL    RBC 4.43 MILLION/mcL  LOW    Hemoglobin 11.0 gm/dL  LOW    Hematocrit 34.9 %  LOW    MCV 78.8 fL    MCH 24.8 pg  LOW    MCHC 31.5 gm/dL  LOW    RDW-CV 16.7 %  HI    Platelet 159 THOUSAND/mcL    Neutrophils 79 %    Abs Neut 4.8 THOUSAND/mcL    Segs NOT APPLICABLE    Lymph 10 %    Absolute Lymph 0.6 THOUSAND/mcL  LOW    Monocytes 10 %    Abs Mono 0.6 THOUSAND/mcL    Eosinophils 1 %    Absolute Eos 0.0 THOUSAND/mcL  LOW    Basophils 0 %    Absolute Baso 0.0 THOUSAND/mcL    Analyzer ANC NOT APPLICABLE    Percent Immature Granulocytes 0 %    Absolute Immature Granulocytes 0.0 THOUSAND/mcL    NRBC 0 /100 WBC   .   Radiology results:    Result title:  XR HIP RT 2V  Result status:  Final  Verified by:  REBECCA SHAFER on 05/31/2019 11:39  IMPRESSION:1.   Acute appearing right transcervical femoral neck fracture with varus angulation and foreshortening, suboptimally assessed.2.   Osteopenia.  Mild osteoarthrosis of the right hip.  Result title:  XR CHEST 1V  Result status:  Final  Verified by:  MICHEAL GAN on 05/31/2019 11:40  IMPRESSION: Linear nodular opacities in the left lower lung medially may reflect normal branching structures rather than an airspace opacity or atelectasis.  Follow-up as clinically warranted.The exam is otherwise unremarkable..     Reexamination/ Reevaluation   Time: 05/31/19 12:05:00 .   Notes: Discussed case in detail with Dr. Lucas including lab and imaging results. Dr. Lucas will admit and would like orthopaedics to be placed on consult.     Impression and Plan   Diagnosis   Fracture of right hip ([id number] S72.001A, Discharge, Medical)     Calls-Consults   -  05/31/19 12:05:00 , KACY PATINO, phone call.    Plan   Condition: Stable, Guarded.    Disposition: Admit time  05/31/19 12:14:00, Admit to Surgery, KACY PATINO.   Counseled: Family, Regarding diagnosis, Regarding treatment plan, family understood.   Notes: Charting  performed by MARK Johnson for Dr. Rg  I have reviewed the scribe's charting and agree that the final signed note accurately reflects my personal performance of the history, physical exam, hospital course, and assessment and plan.

## 2023-08-01 NOTE — PROGRESS NOTES
Pastoral Care Progress Note    2023  Patient: Dave Rosado : 1950  Admission Date & Time: 2023 0158  MRN: [id number] CSN: [id number]      [facility], in consult with RN and CM, initiated visit to pt and family. Pt received [facility] alert and reclined in her bed, daughter, son, extended family present and had spent the night in pt's room. Daughter of pt shared that they intend to continue spending time with Nini Harp. Daughter of pt shared her perception that the pt is more alert and interactive today, and the family wishes to enjoy that for as long as they may. Daughter of pt shared that pt is communicating well via writing, so the family have been answering her questions using pen and paper. CH provided empathetic listening, normalized family experience, extended her availability to family. CH will follow. Chaplaincy Interventions Utilized:   Empowerment: Encouraged focus on present    Exploration: Explored relational needs & resources    Collaboration: Consulted with interdisciplinary team    Relationship Building: Listened empathically    Chaplaincy Outcomes Achieved:   Identified meaningful connections    Spiritual Coping Strategies Utilized:   Connectedness     23 1000   Clinical Encounter Type   Visited With Patient and family together  (daughter, son, extended family)   Routine Visit Introduction   Continue Visiting Yes   Referral From Nurse

## 2023-08-01 NOTE — NURSING NOTE
Patient continues with lethargy. Experiences periods of eye opening and speech, recognizing family at bedside. Care and comfort measures maintained. [place] and incontinent care rendered. Patient suctioned for scant amount thick yellow secretions. Family remains present at bedside.

## 2023-08-01 NOTE — PROGRESS NOTES
[address] [facility]  Progress Note  Name: Susie Wills  MRN: [id number]  Unit/Bed#: -27 I Date of Admission: 7/28/2023   Date of Service: 8/1/2023 I Hospital Day: 4    Assessment/Plan   Acute kidney injury [facility])  Assessment & Plan  Likely 2/2 ATN vs cardiorenal syndrome    · Comfort measures only    Acute decompensated heart failure [facility])  Assessment & Plan  Wt Readings from Last 3 Encounters:   07/28/23 135 kg (296 lb 15.4 oz)   04/28/23 120 kg (265 lb 3.4 oz)   04/25/23 120 kg (265 lb 3.4 oz)     Echo 4/24/23 EF 20%. Severe AS (AV peak velocity 2.1 m/s. AV mean gradient 9 mmHg. DVI 0.19). Moderate TR  She follows with cardiology at [facility] for CAD, AS, cardiomyopathy, and CHF. She was hospitalized in 2018 with CHF. She has had cardiac cath twice in the past. She had MI in her 25s. GDMT: 20mg lasix, losartan,metoprolol. Single lead ICD in place. Cardiology consulted this admission. Clinically appearred volume overloaded on presentation. High dose diuretic challenge ordered - failed  Concern to cardiogenic shock (see plan under shock)    · Comfort measures only    Acute encephalopathy  Assessment & Plan  In setting of severe cardiogenic shock and malperfusion  CTH NAICA  Hyponatremia in setting of volume overload    · Comfort measures only initiated 07/28.   She has deteriorated significantly overnight and we will continue her inpatient on comfort measures.       * Shock [facility])  Assessment & Plan  Cardiogenic shock  After extensive discussion with family, they do not desire any invasive monitoring although they are OK with high-dose diuretic challenge which unfortunately was unsuccessful  See further details under heart failure a/p  POCUS EF 20%, formal echo prior to transition of comfort     · Comfort measures only    Goals of care, counseling/discussion  Assessment & Plan  Patient transitioned to comfort care 07/28  Patient's appears comfortable on exam    · PRN dilaudid, haldol, and robinul  · CM following as patient will require hospice placement  · If patient improves, unable to be discharged on home hospice as [facility] investigation at this time    Decreased pedal pulses  Assessment & Plan  Personally evaluated. Unable to obtain dopplerable dorsalis pedus b/l. + posterior tibialis. Distal extremities cool, mottled and discolored w/ presence of chronic venous stasis changes  BLE duplex w/ diffuse PAD    · Comfort measures only       Hyperbilirubinemia  Assessment & Plan  Subacute  Ongoing workup in process with outpatient GI/hepatology  MRI: 1.  Mild hepatic steatosis. Cirrhotic morphology was better visualized on ultrasound.  Echogenic lesion seen on ultrasound is not definitely visualized given limitations of the study. No definite suspicious mass is visualized and this may have represented focal fat. 3. Arvie Sparrow increased abdominopelvic ascites and diffuse subcutaneous edema. · Comfort measures only    Acute hyponatremia  Assessment & Plan  Likely hypervolemic hyponatremia in setting of volume overload    · Comfort measures only    Atrial fibrillation (HCC)  Assessment & Plan       · Cardiopulmonary monitoring discontinued, comfort measures only       Type 2 diabetes mellitus, without long-term current use of insulin (HCC)  Assessment & Plan  Lab Results   Component Value Date    HGBA1C 6.8 (H) 04/18/2023       Recent Labs     07/29/23  2255   POCGLU 140       Blood Sugar Average: Last 72 hrs:  (P) 140    · Comfort measures only    Disease of thyroid gland  Assessment & Plan  · Comfort measures only             VTE Pharmacologic Prophylaxis:   Patient is on comfort care    Patient Centered Rounds: I performed bedside rounds with nursing staff today. Discussions with Specialists or Other Care Team Provider: None    Education and Discussions with Family / Patient: Updated  (daughter) at bedside.     Total Time Spent on Date of Encounter in care of patient: 5 minutes This time was spent on one or more of the following: performing physical exam; counseling and coordination of care; obtaining or reviewing history; documenting in the medical record; reviewing/ordering tests, medications or procedures; communicating with other healthcare professionals and discussing with patient's family/caregivers. Current Length of Stay: 4 day(s)  Current Patient Status: Inpatient   Certification Statement: The patient will continue to require additional inpatient hospital stay due to To monitor above condition  Discharge Plan: To be due to mild    Code Status: Level 4 - Comfort Care    Subjective:   Seen and evaluated during the rounds. Patient unable to hear, conversation writing on the paper. Family member at the bedside. Objective:     Vitals:   No data recorded. HR:  [54-68] 54  Resp:  [17-20] 20  Body mass index is 50.97 kg/m². Input and Output Summary (last 24 hours): Intake/Output Summary (Last 24 hours) at 8/1/2023 1633  Last data filed at 8/1/2023 1310  Gross per 24 hour   Intake --   Output 30 ml   Net -30 ml       Physical Exam:   Physical Exam  Vitals and nursing note reviewed. Constitutional:       Appearance: She is obese. She is ill-appearing. Eyes:      Extraocular Movements: Extraocular movements intact. Pupils: Pupils are equal, round, and reactive to light. Cardiovascular:      Rate and Rhythm: Normal rate. Pulmonary:      Effort: No respiratory distress. Breath sounds: No stridor. No wheezing or rhonchi. Abdominal:      General: Abdomen is flat. Bowel sounds are normal. There is no distension. Palpations: There is no mass. Tenderness: There is no abdominal tenderness. Neurological:      Mental Status: She is alert.       Comments: Alert and awake         Additional Data:     Labs:  Results from last 7 days   Lab Units 07/28/23  0326   WBC Thousand/uL 10.29*   HEMOGLOBIN g/dL 15.2   HEMATOCRIT % 44.7 PLATELETS Thousands/uL 149   NEUTROS PCT % 85*   LYMPHS PCT % 11*   MONOS PCT % 3*   EOS PCT % 0     Results from last 7 days   Lab Units 07/28/23  0326   SODIUM mmol/L 121*   POTASSIUM mmol/L 4.3   CHLORIDE mmol/L 89*   CO2 mmol/L 18*   BUN mg/dL 28*   CREATININE mg/dL 1.29   ANION GAP mmol/L 14   CALCIUM mg/dL 8.7   ALBUMIN g/dL 3.2*   TOTAL BILIRUBIN mg/dL 4.61*   ALK PHOS U/L 152*   ALT U/L 28   AST U/L 27   GLUCOSE RANDOM mg/dL 148*     Results from last 7 days   Lab Units 07/28/23  0326   INR  1.90*     Results from last 7 days   Lab Units 07/29/23  2255 07/28/23  0827   POC GLUCOSE mg/dl 140 135         Results from last 7 days   Lab Units 07/28/23  0902 07/28/23  0326   LACTIC ACID mmol/L 3.0* 2.8*   PROCALCITONIN ng/ml  --  0.28*       Lines/Drains:  Invasive Devices     Peripheral Intravenous Line  Duration           Long-Dwell Peripheral IV (Midline) 07/28/23 Left Brachial 4 days          Drain  Duration           Urethral Catheter Temperature probe 16 Fr. 4 days              Urinary Catheter:  Goal for removal: on hospice care               Imaging: No pertinent imaging reviewed. Recent Cultures (last 7 days):   Results from last 7 days   Lab Units 07/28/23  0336 07/28/23  0326   BLOOD CULTURE  No Growth After 4 Days. No Growth After 4 Days. Last 24 Hours Medication List:   Current Facility-Administered Medications   Medication Dose Route Frequency Provider Last Rate   • glycopyrrolate  0.1 mg Intravenous Q4H PRN Rin Freeman PA-C     • haloperidol lactate  0.5 mg Intravenous Q2H PRN Rin Freeman PA-C     • HYDROmorphone  0.5 mg Intravenous Q1H PRN VLAD Calvo     • LORazepam  0.5 mg Intravenous Q4H PRN Danica Valenzuela MD     • scopolamine  1 patch Transdermal Q72H Danica Valenzuela MD          Today, Patient Was Seen By: Alejo Cole MD    **Please Note: This note may have been constructed using a voice recognition system. **

## 2023-08-02 NOTE — ASSESSMENT & PLAN NOTE
Subacute  Ongoing workup in process with outpatient GI/hepatology  MRI: 1.  Mild hepatic steatosis. Cirrhotic morphology was better visualized on ultrasound.  Echogenic lesion seen on ultrasound is not definitely visualized given limitations of the study. No definite suspicious mass is visualized and this may have represented focal fat. 3. Claudean Belling increased abdominopelvic ascites and diffuse subcutaneous edema.     · Comfort measures only

## 2023-08-02 NOTE — ASSESSMENT & PLAN NOTE
Lab Results   Component Value Date    HGBA1C 6.8 (H) 04/18/2023       No results for input(s): "POCGLU" in the last 72 hours.     Blood Sugar Average: Last 72 hrs:      · Comfort measures only

## 2023-08-02 NOTE — PROGRESS NOTES
[address] [facility]  Progress Note  Name: Monette Barthel  MRN: [id number]  Unit/Bed#: -88 I Date of Admission: 7/28/2023   Date of Service: 8/2/2023 I Hospital Day: 5    Assessment/Plan   Acute kidney injury [facility])  Assessment & Plan  Likely 2/2 ATN vs cardiorenal syndrome    · Comfort measures only    Acute decompensated heart failure [facility])  Assessment & Plan  Wt Readings from Last 3 Encounters:   07/28/23 135 kg (296 lb 15.4 oz)   04/28/23 120 kg (265 lb 3.4 oz)   04/25/23 120 kg (265 lb 3.4 oz)     Echo 4/24/23 EF 20%. Severe AS (AV peak velocity 2.1 m/s. AV mean gradient 9 mmHg. DVI 0.19). Moderate TR  She follows with cardiology at [facility] for CAD, AS, cardiomyopathy, and CHF. She was hospitalized in 2018 with CHF. She has had cardiac cath twice in the past. She had MI in her 25s. GDMT: 20mg lasix, losartan,metoprolol. Single lead ICD in place. Cardiology consulted this admission. Clinically appearred volume overloaded on presentation. High dose diuretic challenge ordered - failed  Concern to cardiogenic shock (see plan under shock)    · Comfort measures only    Acute encephalopathy  Assessment & Plan  In setting of severe cardiogenic shock and malperfusion  CTH NAICA  Hyponatremia in setting of volume overload    · Comfort measures only initiated 07/28.   She has deteriorated significantly overnight and we will continue her inpatient on comfort measures.       * Shock [facility])  Assessment & Plan  Cardiogenic shock  After extensive discussion with family, they do not desire any invasive monitoring although they are OK with high-dose diuretic challenge which unfortunately was unsuccessful  See further details under heart failure a/p  POCUS EF 20%, formal echo prior to transition of comfort     · Comfort measures only    Goals of care, counseling/discussion  Assessment & Plan  Patient transitioned to comfort care 07/28  Patient's appears comfortable on exam    · PRN dilaudid, haldol, and robinul  · CM following as patient will require hospice placement  · If patient improves, unable to be discharged on home hospice as open Office [facility] investigation at this time    Decreased pedal pulses  Assessment & Plan  Personally evaluated. Unable to obtain dopplerable dorsalis pedus b/l. + posterior tibialis. Distal extremities cool, mottled and discolored w/ presence of chronic venous stasis changes  BLE duplex w/ diffuse PAD    · Comfort measures only       Hyperbilirubinemia  Assessment & Plan  Subacute  Ongoing workup in process with outpatient GI/hepatology  MRI: 1.  Mild hepatic steatosis. Cirrhotic morphology was better visualized on ultrasound.  Echogenic lesion seen on ultrasound is not definitely visualized given limitations of the study. No definite suspicious mass is visualized and this may have represented focal fat. 3. [place] increased abdominopelvic ascites and diffuse subcutaneous edema. · Comfort measures only    Acute hyponatremia  Assessment & Plan  Likely hypervolemic hyponatremia in setting of volume overload    · Comfort measures only    Atrial fibrillation (HCC)  Assessment & Plan       · Cardiopulmonary monitoring discontinued, comfort measures only       Type 2 diabetes mellitus, without long-term current use of insulin (HCC)  Assessment & Plan  Lab Results   Component Value Date    HGBA1C 6.8 (H) 04/18/2023       No results for input(s): "POCGLU" in the last 72 hours. Blood Sugar Average: Last 72 hrs:      · Comfort measures only    Disease of thyroid gland  Assessment & Plan  · Comfort measures only               VTE Pharmacologic Prophylaxis:   Patient is on comfort care    Patient Centered Rounds: I performed bedside rounds with nursing staff today. Discussions with Specialists or Other Care Team Provider: None    Education and Discussions with Family / Patient: Updated  (daughter) via phone.     Total Time Spent on Date of Encounter in care of patient: 5 minutes This time was spent on one or more of the following: performing physical exam; counseling and coordination of care; obtaining or reviewing history; documenting in the medical record; reviewing/ordering tests, medications or procedures; communicating with other healthcare professionals and discussing with patient's family/caregivers. Current Length of Stay: 5 day(s)  Current Patient Status: Inpatient   Certification Statement: The patient will continue to require additional inpatient hospital stay due to To monitor above condition  Discharge Plan: To be due to mild    Code Status: Level 4 - Comfort Care    Subjective:   Seen and evaluated and examined, no overnight change. Objective:     Vitals:   No data recorded. HR:  [60-74] 60  Resp:  [20-24] 20  Body mass index is 50.97 kg/m². Input and Output Summary (last 24 hours):   No intake or output data in the 24 hours ending 08/02/23 1433    Physical Exam:   Physical Exam  Vitals reviewed. Eyes:      General: No scleral icterus. Left eye: No discharge. Extraocular Movements: Extraocular movements intact. Conjunctiva/sclera: Conjunctivae normal.      Pupils: Pupils are equal, round, and reactive to light. Cardiovascular:      Rate and Rhythm: Normal rate. Heart sounds: No friction rub. No gallop. Pulmonary:      Effort: No respiratory distress. Breath sounds: No stridor. Rales present. No wheezing or rhonchi. Abdominal:      General: Abdomen is flat. Bowel sounds are normal. There is no distension. Palpations: There is no mass. Tenderness: There is no abdominal tenderness. Hernia: No hernia is present. Musculoskeletal:      Cervical back: Normal range of motion. Neurological:      Mental Status: She is alert. Mental status is at baseline.          Additional Data:     Labs:  Results from last 7 days   Lab Units 07/28/23  0326   WBC Thousand/uL 10.29*   HEMOGLOBIN g/dL 15.2 HEMATOCRIT % 44.7   PLATELETS Thousands/uL 149   NEUTROS PCT % 85*   LYMPHS PCT % 11*   MONOS PCT % 3*   EOS PCT % 0     Results from last 7 days   Lab Units 07/28/23  0326   SODIUM mmol/L 121*   POTASSIUM mmol/L 4.3   CHLORIDE mmol/L 89*   CO2 mmol/L 18*   BUN mg/dL 28*   CREATININE mg/dL 1.29   ANION GAP mmol/L 14   CALCIUM mg/dL 8.7   ALBUMIN g/dL 3.2*   TOTAL BILIRUBIN mg/dL 4.61*   ALK PHOS U/L 152*   ALT U/L 28   AST U/L 27   GLUCOSE RANDOM mg/dL 148*     Results from last 7 days   Lab Units 07/28/23  0326   INR  1.90*     Results from last 7 days   Lab Units 07/29/23  2255 07/28/23  0827   POC GLUCOSE mg/dl 140 135         Results from last 7 days   Lab Units 07/28/23  0902 07/28/23  0326   LACTIC ACID mmol/L 3.0* 2.8*   PROCALCITONIN ng/ml  --  0.28*       Lines/Drains:  Invasive Devices     Peripheral Intravenous Line  Duration           Long-Dwell Peripheral IV (Midline) 07/28/23 Left Brachial 5 days          Drain  Duration           Urethral Catheter Temperature probe 16 Fr. 5 days              Urinary Catheter:  Goal for removal: on comfort care               Imaging: No pertinent imaging reviewed. Recent Cultures (last 7 days):   Results from last 7 days   Lab Units 07/28/23  0336 07/28/23  0326   BLOOD CULTURE  No Growth After 5 Days. No Growth After 5 Days. Last 24 Hours Medication List:   Current Facility-Administered Medications   Medication Dose Route Frequency Provider Last Rate   • glycopyrrolate  0.1 mg Intravenous Q4H PRN Daniele Garsia PA-C     • haloperidol lactate  0.5 mg Intravenous Q2H PRN Daniele Garsia PA-C     • HYDROmorphone  0.5 mg Intravenous Q1H PRN VLAD Calvo     • LORazepam  0.5 mg Intravenous Q4H PRN Jhony Quiñonez MD     • scopolamine  1 patch Transdermal Q72H Jhony Quiñonez MD          Today, Patient Was Seen By: Autumn Blount MD    **Please Note: This note may have been constructed using a voice recognition system. **

## 2023-08-02 NOTE — CASE MANAGEMENT
Case Management Discharge Planning Note    Patient name Benita Patterson  Location [address] [facility] [place] 336/-40 MRN [id number]  : 1950 Date 2023       Current Admission Date: 2023  Current Admission Diagnosis:Shock [facility])   Patient Active Problem List    Diagnosis Date Noted   • Goals of care, counseling/discussion 2023   • Shock ([address]) 2023   • Acute hepatic encephalopathy ([address]) 2023   • Acute encephalopathy 2023   • Liver failure ([address]) 2023   • Acute hyponatremia 2023   • Acute decompensated heart failure ([address]) 2023   • Ambulatory dysfunction 2023   • Hyperbilirubinemia 2023   • Acute kidney injury ([address]) 2023   • Decreased pedal pulses 2023   • Abnormal CT scan 2023   • Supratherapeutic INR 2023   • Disease of thyroid gland    • Combined systolic and diastolic congestive heart failure ([address])    • Type 2 diabetes mellitus, without long-term current use of insulin (HCC)    • Atrial fibrillation (HCC)    • Anxiety and depression    • Nausea and vomiting    • Elevated LFTs       LOS (days): 5  Geometric Mean LOS (GMLOS) (days): 3.90  Days to GMLOS:-1.6     OBJECTIVE:  Risk of Unplanned Readmission Score: 16.84         Current admission status: Inpatient   Preferred Pharmacy:   [address] [place] [address]  Phone: [phone] Fax: [phone]    Primary Care Provider: Chica Bruce MD    Primary Insurance: MEDICARE  Secondary Insurance:     DISCHARGE DETAILS:     CM met with daughter, Millie James, and son, Roman Dinh, to discuss patients current medical status and potential referral for inpatient hospice house for patients continued care. Daughter agreeable to referrals indicating [facility] would be her first choice as she has friends in the area but also agreeable with [facility] location.      CM made AIDIN referral to inpatient hospice house at [address] [place] [address] [address] [facility] [address] [facility]. CM to follow.

## 2023-08-02 NOTE — ASSESSMENT & PLAN NOTE
Wt Readings from Last 3 Encounters:   07/28/23 135 kg (296 lb 15.4 oz)   04/28/23 120 kg (265 lb 3.4 oz)   04/25/23 120 kg (265 lb 3.4 oz)     Echo 4/24/23 EF 20%. Severe AS (AV peak velocity 2.1 m/s. AV mean gradient 9 mmHg. DVI 0.19). Moderate TR  She follows with cardiology at [facility] for CAD, AS, cardiomyopathy, and CHF. She was hospitalized in 2018 with CHF. She has had cardiac cath twice in the past. She had MI in her 25s. GDMT: 20mg lasix, losartan,metoprolol. Single lead ICD in place. Cardiology consulted this admission. Clinically appearred volume overloaded on presentation.    High dose diuretic challenge ordered - failed  Concern to cardiogenic shock (see plan under shock)    · Comfort measures only

## 2023-08-02 NOTE — PLAN OF CARE
Problem: Nutrition/Hydration-ADULT  Goal: Nutrient/Hydration intake appropriate for improving, restoring or maintaining nutritional needs  Description: Monitor and assess patient's nutrition/hydration status for malnutrition. Collaborate with interdisciplinary team and initiate plan and interventions as ordered. Monitor patient's weight and dietary intake as ordered or per policy. Utilize nutrition screening tool and intervene as necessary. Determine patient's food preferences and provide high-protein, high-caloric foods as appropriate.      INTERVENTIONS:  - Monitor oral intake, urinary output, labs, and treatment plans  - Assess nutrition and hydration status and recommend course of action  - Evaluate amount of meals eaten  - Assist patient with eating if necessary   - Allow adequate time for meals  - Recommend/ encourage appropriate diets, oral nutritional supplements, and vitamin/mineral supplements  - Order, calculate, and assess calorie counts as needed  - Recommend, monitor, and adjust tube feedings and TPN/PPN based on assessed needs  - Assess need for intravenous fluids  - Provide specific nutrition/hydration education as appropriate  - Include patient/family/caregiver in decisions related to nutrition  Outcome: Not Progressing     Problem: MOBILITY - ADULT  Goal: Maintain or return to baseline ADL function  Description: INTERVENTIONS:  -  Assess patient's ability to carry out ADLs; assess patient's baseline for ADL function and identify physical deficits which impact ability to perform ADLs (bathing, care of mouth/teeth, toileting, grooming, dressing, etc.)  - Assess/evaluate cause of self-care deficits   - Assess range of motion  - Assess patient's mobility; develop plan if impaired  - Assess patient's need for assistive devices and provide as appropriate  - Encourage maximum independence but intervene and supervise when necessary  - Involve family in performance of ADLs  - Assess for home care needs following discharge   - Consider OT consult to assist with ADL evaluation and planning for discharge  - Provide patient education as appropriate  Outcome: Not Progressing  Goal: Maintains/Returns to pre admission functional level  Description: INTERVENTIONS:  - Perform BMAT or MOVE assessment daily.   - Set and communicate daily mobility goal to care team and patient/family/caregiver. - Collaborate with rehabilitation services on mobility goals if consulted    - Reposition patient every 2 hours.   -- Record patient progress and toleration of activity level   Outcome: Not Progressing     Problem: Prexisting or High Potential for Compromised Skin Integrity  Goal: Skin integrity is maintained or improved  Description: INTERVENTIONS:  - Identify patients at risk for skin breakdown  - Assess and monitor skin integrity  - Assess and monitor nutrition and hydration status  - Monitor labs   - Assess for incontinence   - Turn and reposition patient  - Assist with mobility/ambulation  - Relieve pressure over bony prominences  - Avoid friction and shearing  - Provide appropriate hygiene as needed including keeping skin clean and dry  - Evaluate need for skin moisturizer/barrier cream  - Collaborate with interdisciplinary team   - Patient/family teaching  - Consider wound care consult   Outcome: Not Progressing     Problem: PAIN - ADULT  Goal: Verbalizes/displays adequate comfort level or baseline comfort level  Description: Interventions:  - Encourage patient to monitor pain and request assistance  - Assess pain using appropriate pain scale0-10  - Administer analgesics based on type and severity of pain and evaluate response  - Implement non-pharmacological measures as appropriate and evaluate response  - Consider cultural and social influences on pain and pain management  - Notify physician/advanced practitioner if interventions unsuccessful or patient reports new pain  Outcome: Not Progressing

## 2023-08-03 NOTE — DEATH NOTE
INPATIENT DEATH NOTE  [facility] 67 y.o. female MRN: [id number]  Unit/Bed#: -01 Encounter: [id number]    Date, Time and Cause of Death    Date of Death: 23  Time of Death: 10:37 PM  Preliminary Cause of Death: Respiratory failure, acute ([address])  Entered by: Moris CHU[MH1.1]     Attribution     MH1.1 VLAD Calvo 23 00:19           Patient's Information  Pronounced by: Huy Turpin  Did the patient's death occur in the ED?: No  Did the patient's death occur in the OR?: No  Did the patient's death occur less than 10 days post-op?: No  Did the patient's death occur within 24 hours of admission?: No  Was code status DNR at the time of death?: Yes    PHYSICAL EXAM:  Unresponsive to noxious stimuli, Spontaneous respirations absent, Breath sounds absent, Carotid pulse absent, Heart sounds absent, Pupillary light reflex absent and Corneal blink reflex absent    Medical Examiner notification criteria:  NONE APPLICABLE   Medical Examiner's office notified?:  Yes   Medical Examiner accepted case?:  No    Family Notification  Was the family notified?: Yes  Date Notified: 23  Time Notified:   Notified by: Moris Win NP  Name of Family Notified of Death: Jarad Harika   Relationship to Patient: Daughter  Family Notification Route:  At bedside  Was the family told to contact a  home?: Yes  Name of  Home[de-identified] Debria Peer    Autopsy Options:  Post-mortem examination declined by next of kin    Primary Service Attending Physician notified?:  yes - Attending:  No att. providers found

## 2023-08-03 NOTE — NURSING NOTE
Ari Coleman from [employer] called back and made aware to have Chica Lunsford call  office at [phone] in the AM to discuss case.

## 2023-08-03 NOTE — ASSESSMENT & PLAN NOTE
Wt Readings from Last 3 Encounters:   07/28/23 135 kg (296 lb 15.4 oz)   04/28/23 120 kg (265 lb 3.4 oz)   04/25/23 120 kg (265 lb 3.4 oz)     Echo 4/24/23 EF 20%. Severe AS (AV peak velocity 2.1 m/s. AV mean gradient 9 mmHg. DVI 0.19). Moderate TR  She follows with cardiology at [facility] for CAD, AS, cardiomyopathy, and CHF. She was hospitalized in 2018 with CHF. She has had cardiac cath twice in the past. She had MI in her 25s. GDMT: 20mg lasix, losartan,metoprolol. Single lead ICD in place. Cardiology consulted this admission. Clinically appearred volume overloaded on presentation.    High dose diuretic challenge ordered - failed  Concern to cardiogenic shock (see plan under shock)    · Comfort measures only

## 2023-08-03 NOTE — NURSING NOTE
Prince Cavazos from [employer] called to place hold on release of body until speaking with family.

## 2023-08-03 NOTE — NURSING NOTE
Call placed to [facility] of senior Services and spoke with Gregorio Peres with the answering service to report patient CTB, she will get message to on call agent.

## 2023-08-03 NOTE — DISCHARGE SUMMARY
[address] [facility]  Discharge- Cleo Irizarry 1950, 67 y.o. female MRN: [id number]  Unit/Bed#: -Nicki Encounter: [id number]  Primary Care Provider: Karma Pichardo MD   Date and time admitted to hospital: 7/28/2023  1:58 AM    Acute encephalopathy  Assessment & Plan  In setting of severe cardiogenic shock and malperfusion  CTH NAICA  Hyponatremia in setting of volume overload    · Comfort measures only initiated 07/28. She has deteriorated significantly overnight and we will continue her inpatient on comfort measures.       Goals of care, counseling/discussion  Assessment & Plan  Patient transitioned to comfort care 07/28  Patient's appears comfortable on exam    · PRN dilaudid, haldol, and robinul  · CM following as patient will require hospice placement  · If patient improves, unable to be discharged on home hospice as open Office Senior Services investigation at this time    Acute kidney injury [facility])  Assessment & Plan  Likely 2/2 ATN vs cardiorenal syndrome    · Comfort measures only    Hyperbilirubinemia  Assessment & Plan  Subacute  Ongoing workup in process with outpatient GI/hepatology  MRI: 1.  Mild hepatic steatosis. Cirrhotic morphology was better visualized on ultrasound.  Echogenic lesion seen on ultrasound is not definitely visualized given limitations of the study. No definite suspicious mass is visualized and this may have represented focal fat. 3. Arvie Sparrow increased abdominopelvic ascites and diffuse subcutaneous edema. · Comfort measures only    Acute decompensated heart failure [facility])  Assessment & Plan  Wt Readings from Last 3 Encounters:   07/28/23 135 kg (296 lb 15.4 oz)   04/28/23 120 kg (265 lb 3.4 oz)   04/25/23 120 kg (265 lb 3.4 oz)     Echo 4/24/23 EF 20%. Severe AS (AV peak velocity 2.1 m/s. AV mean gradient 9 mmHg. DVI 0.19). Moderate TR  She follows with cardiology at [facility] for CAD, AS, cardiomyopathy, and CHF. She was hospitalized in 2018 with CHF.  She has had cardiac cath twice in the past. She had MI in her 25s. GDMT: 20mg lasix, losartan,metoprolol. Single lead ICD in place. Cardiology consulted this admission. Clinically appearred volume overloaded on presentation. High dose diuretic challenge ordered - failed  Concern to cardiogenic shock (see plan under shock)    · Comfort measures only    Acute hyponatremia  Assessment & Plan  Likely hypervolemic hyponatremia in setting of volume overload    · Comfort measures only    Atrial fibrillation (HCC)  Assessment & Plan       · Cardiopulmonary monitoring discontinued, comfort measures only       Type 2 diabetes mellitus, without long-term current use of insulin (HCC)  Assessment & Plan  Lab Results   Component Value Date    HGBA1C 6.8 (H) 2023       No results for input(s): "POCGLU" in the last 72 hours.     Blood Sugar Average: Last 72 hrs:      · Comfort measures only  ·     Disease of thyroid gland  Assessment & Plan  · Comfort measures only    * Shock [facility])  Assessment & Plan  Cardiogenic shock  After extensive discussion with family, they do not desire any invasive monitoring although they are OK with high-dose diuretic challenge which unfortunately was unsuccessful  See further details under heart failure a/p  POCUS EF 20%, formal echo prior to transition of comfort     · Comfort measures only  ·         Medical Problems     Resolved Problems  Date Reviewed: 2023          Resolved    Low serum magnesium level 2023     Resolved by  Serena Calvo PA-C    Metabolic acidosis      Resolved by  Serena Calvo PA-C          Admission Date:   Admission Orders (From admission, onward)     Ordered        23 0444  INPATIENT ADMISSION  Once                        Admitting Diagnosis: Anasarca [R60.1]  CHF (congestive heart failure) ([address]) [I50.9]  Hyponatremia [E87.1]  Weakness generalized [R53.1]  AMS (altered mental status) [R41.82]    HPI: Denis Purcell Raquel Boss is a 67 y.o. female with a PMH of CHF reduced ejection fraction, PAF on warfarin, abnormal liver enzymes, NADIA, hypothyroidism, amatory dysfunction and has not been out of her bed for the past month after a fall in her home who presents with with increasing confusion and weakness. History obtained from daughter who lives with patient states patient follows with [facility] cardiology but she cannot locate a medication list and that patient has not been taking her medications on a regular basis and has not taken furosemide in the past few weeks. Patient's daughter states on Sunday when she came back from vacation noticed the patient was confused and this is worsened over the past week now with decreased oral intake as well. Patient's daughter concerned with care ability for the patient in her home even though she has other family members assisting her she is concerned the patient is not getting adequate care and has contacted Senior services. In the emergency department patient is nonfocal neurological exam but unable to answer questions correctly or follow commands. Patient is moving all extremities. Noted to have wounds over trunk anterior/posterior and all extremities consistent with daughter stating patient has not been out of her bed for the past month. Patient's daughter states significant edema and patient is 30 pound weight increase from previous admission in April with diffuse anasarca. Presented to the medical service for further evaluation and treatment of acute hyponatremia, decompensated CHF, altered mental status with normal CT brain but UA and ammonia levels are pending. Procedures Performed:   Orders Placed This Encounter   Procedures   • ED ECG Documentation Only   • Midline Insertion   • POC Cardiac US   • POC Cardiac US       Summary of Hospital Course: Initiated on high dose diuretics but developed cardiogenic shock, anuric and was upgraded to critical care service.  Family opted for comfort care measures. Maintained on comfort measures until  23.     Condition at Time of Death: comfort care    Final Diagnosis: cardiogenic shock    Date, Time and Cause of Death    Preliminary Cause of Death: Respiratory failure, acute ([address])  Entered by: Constanza CHU[MH1.1]     Attribution     MH1.1 VLAD Juarez 23 00:19          PCP: Kiah Yap MD    Disposition:

## 2023-08-03 NOTE — ASSESSMENT & PLAN NOTE
Cardiogenic shock  After extensive discussion with family, they do not desire any invasive monitoring although they are OK with high-dose diuretic challenge which unfortunately was unsuccessful  See further details under heart failure a/p  POCUS EF 20%, formal echo prior to transition of comfort     · Comfort measures only  ·

## 2023-08-03 NOTE — ASSESSMENT & PLAN NOTE
Lab Results   Component Value Date    HGBA1C 6.8 (H) 2023       No results for input(s): "POCGLU" in the last 72 hours.     Blood Sugar Average: Last 72 hrs:      · Comfort measures only  ·

## 2023-08-03 NOTE — CASE MANAGEMENT
Case Management Discharge Planning Note    [facility]  Location [address] [facility] [id number]/-[id number] MRN [id number]  : 1950 Date 8/3/2023       Current Admission Date: 2023  Current Admission Diagnosis:Shock [facility])   Patient Active Problem List    Diagnosis Date Noted   • Goals of care, counseling/discussion 2023   • Shock ([address]) 2023   • Acute hepatic encephalopathy ([address]) 2023   • Acute encephalopathy 2023   • Liver failure ([address]) 2023   • Acute hyponatremia 2023   • Acute decompensated heart failure ([address]) 2023   • Ambulatory dysfunction 2023   • Hyperbilirubinemia 2023   • Acute kidney injury ([address]) 2023   • Decreased pedal pulses 2023   • Abnormal CT scan 2023   • Supratherapeutic INR 2023   • Disease of thyroid gland    • Combined systolic and diastolic congestive heart failure ([address])    • Type 2 diabetes mellitus, without long-term current use of insulin (HCC)    • Atrial fibrillation (HCC)    • Anxiety and depression    • Nausea and vomiting    • Elevated LFTs       LOS (days): 6  Geometric Mean LOS (GMLOS) (days): 3.90  Days to GMLOS:-2     OBJECTIVE:  Risk of Unplanned Readmission Score: 16.97         Current admission status: Inpatient   Preferred Pharmacy:   [address] [place] [address] Road  [address] [place] [address]  Phone: [phone] Fax: [phone]    Primary Care Provider: Sarah Garcia MD    Primary Insurance: MEDICARE  Secondary Insurance:     DISCHARGE DETAILS:     [address] [address], [address] [address] CM made Jayshree Backers aware patient passed last evening, which he had heard. CM made Jayshree Backers aware [employer] will not come pick patient up until they hear directly from [facility]. Jayshree Backers indicated he will call [facility] and request they  patient - [facility] is still requesting autopsy.       1500 CM received MARJORIE Dickey indicating he tried to call [facility] and phone just rings and rings and he has tried to call through [email or web address] and no one calls him back. CM provided telephone number of [phone] for Melchor Anusha to call. [facility] received [facility] Marybel Cruz, , regarding referral from Dory Porter.  inquired if investiagtion is abuse/neglect. CM provided background information on patient and when  asked for patients name he indicated patient is his cousin.  asked for identification of which daughter was caring for patient and CM did not provide  with this information.  indicated he will not do investigation due to conflict but will assist.   indicated he will contact his superior and have someone pick body up.

## 2023-08-03 NOTE — ASSESSMENT & PLAN NOTE
Subacute  Ongoing workup in process with outpatient GI/hepatology  MRI: 1.  Mild hepatic steatosis. Cirrhotic morphology was better visualized on ultrasound.  Echogenic lesion seen on ultrasound is not definitely visualized given limitations of the study. No definite suspicious mass is visualized and this may have represented focal fat. 3. Arlin Bridget increased abdominopelvic ascites and diffuse subcutaneous edema.     · Comfort measures only

## 2023-08-03 NOTE — NURSING NOTE
Spoke with  Olga Sanz about Eleanor Montanez from [employer] recommending to proceed with  case and autopsy from note placed on 7/28 by . Kian South advised the office of senior services would have to contact  office in AM at [phone] to discuss case.